# Patient Record
Sex: FEMALE | Race: BLACK OR AFRICAN AMERICAN | Employment: FULL TIME | URBAN - METROPOLITAN AREA
[De-identification: names, ages, dates, MRNs, and addresses within clinical notes are randomized per-mention and may not be internally consistent; named-entity substitution may affect disease eponyms.]

---

## 2018-02-10 ENCOUNTER — HOSPITAL ENCOUNTER (OUTPATIENT)
Dept: OTHER | Age: 23
Discharge: HOME | End: 2018-02-10
Attending: FAMILY MEDICINE | Admitting: CLINIC/CENTER

## 2018-02-12 LAB
RAPID INFLUENZA  B AGN: NEGATIVE
RAPID INFLUENZA A AGN: NEGATIVE

## 2018-12-07 ENCOUNTER — APPOINTMENT (OUTPATIENT)
Dept: GENERAL RADIOLOGY | Age: 23
End: 2018-12-07
Payer: OTHER MISCELLANEOUS

## 2018-12-07 ENCOUNTER — HOSPITAL ENCOUNTER (EMERGENCY)
Age: 23
Discharge: HOME | End: 2018-12-07
Attending: EMERGENCY MEDICINE
Payer: OTHER MISCELLANEOUS

## 2018-12-07 VITALS
HEART RATE: 101 BPM | SYSTOLIC BLOOD PRESSURE: 143 MMHG | RESPIRATION RATE: 20 BRPM | WEIGHT: 150 LBS | HEIGHT: 63 IN | TEMPERATURE: 98 F | BODY MASS INDEX: 26.58 KG/M2 | OXYGEN SATURATION: 100 % | DIASTOLIC BLOOD PRESSURE: 94 MMHG
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
## 2018-12-07 DIAGNOSIS — S16.1XXA STRAIN OF NECK MUSCLE, INITIAL ENCOUNTER: ICD-10-CM

## 2018-12-07 DIAGNOSIS — M79.662 PAIN IN LEFT SHIN: ICD-10-CM

## 2018-12-07 PROCEDURE — 6370000000 HC RX 637 (ALT 250 FOR IP): Performed by: EMERGENCY MEDICINE

## 2018-12-07 PROCEDURE — 71045 X-RAY EXAM CHEST 1 VIEW: CPT

## 2018-12-07 PROCEDURE — 99283 EMERGENCY DEPT VISIT LOW MDM: CPT

## 2018-12-07 PROCEDURE — 73560 X-RAY EXAM OF KNEE 1 OR 2: CPT

## 2018-12-07 PROCEDURE — 73590 X-RAY EXAM OF LOWER LEG: CPT

## 2018-12-07 PROCEDURE — 72040 X-RAY EXAM NECK SPINE 2-3 VW: CPT

## 2018-12-07 RX ORDER — DEXTROAMPHETAMINE SACCHARATE, AMPHETAMINE ASPARTATE, DEXTROAMPHETAMINE SULFATE AND AMPHETAMINE SULFATE 5; 5; 5; 5 MG/1; MG/1; MG/1; MG/1
20 TABLET ORAL DAILY
Status: ON HOLD | COMMUNITY
End: 2020-01-23

## 2018-12-07 RX ORDER — ACETAMINOPHEN 500 MG
1000 TABLET ORAL ONCE
Status: COMPLETED | OUTPATIENT
Start: 2018-12-07 | End: 2018-12-07

## 2018-12-07 RX ORDER — HYDROXYZINE PAMOATE 25 MG/1
25 CAPSULE ORAL ONCE
Status: COMPLETED | OUTPATIENT
Start: 2018-12-07 | End: 2018-12-07

## 2018-12-07 RX ADMIN — ACETAMINOPHEN 1000 MG: 500 TABLET, FILM COATED ORAL at 20:59

## 2018-12-07 RX ADMIN — HYDROXYZINE PAMOATE 25 MG: 25 CAPSULE ORAL at 21:00

## 2018-12-07 ASSESSMENT — PAIN SCALES - GENERAL
PAINLEVEL_OUTOF10: 6
PAINLEVEL_OUTOF10: 6

## 2018-12-07 ASSESSMENT — PAIN DESCRIPTION - LOCATION: LOCATION: NECK;ARM;CHEST

## 2018-12-07 ASSESSMENT — PAIN DESCRIPTION - ORIENTATION: ORIENTATION: LEFT
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
## 2018-12-08 NOTE — ED NOTES
Pt ambulated to the bathroom without difficulties. Pt requesting pregnancy test at this time. Urine cup provided.      Jer Fisher RN  12/07/18 2017
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
## 2018-12-09 ENCOUNTER — APPOINTMENT (OUTPATIENT)
Dept: GENERAL RADIOLOGY | Age: 23
End: 2018-12-09
Payer: OTHER MISCELLANEOUS

## 2018-12-09 ENCOUNTER — HOSPITAL ENCOUNTER (EMERGENCY)
Age: 23
Discharge: HOME | End: 2018-12-09
Payer: OTHER MISCELLANEOUS

## 2018-12-09 VITALS
DIASTOLIC BLOOD PRESSURE: 83 MMHG | OXYGEN SATURATION: 99 % | RESPIRATION RATE: 15 BRPM | BODY MASS INDEX: 26.58 KG/M2 | HEART RATE: 87 BPM | WEIGHT: 150 LBS | SYSTOLIC BLOOD PRESSURE: 133 MMHG | TEMPERATURE: 97.7 F | HEIGHT: 63 IN
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
## 2018-12-09 DIAGNOSIS — R07.81 RIB PAIN ON LEFT SIDE: ICD-10-CM

## 2018-12-09 PROCEDURE — 99284 EMERGENCY DEPT VISIT MOD MDM: CPT

## 2018-12-09 PROCEDURE — 6370000000 HC RX 637 (ALT 250 FOR IP): Performed by: PHYSICIAN ASSISTANT

## 2018-12-09 PROCEDURE — 71101 X-RAY EXAM UNILAT RIBS/CHEST: CPT

## 2018-12-09 RX ORDER — IBUPROFEN 800 MG/1
800 TABLET ORAL ONCE
Status: COMPLETED | OUTPATIENT
Start: 2018-12-09 | End: 2018-12-09

## 2018-12-09 RX ADMIN — IBUPROFEN 800 MG: 800 TABLET ORAL at 21:28

## 2018-12-09 ASSESSMENT — PAIN DESCRIPTION - PAIN TYPE: TYPE: ACUTE PAIN

## 2018-12-09 ASSESSMENT — PAIN DESCRIPTION - LOCATION: LOCATION: RIB CAGE

## 2018-12-09 ASSESSMENT — PAIN SCALES - GENERAL
PAINLEVEL_OUTOF10: 4
PAINLEVEL_OUTOF10: 4

## 2018-12-09 ASSESSMENT — PAIN DESCRIPTION - FREQUENCY: FREQUENCY: CONTINUOUS

## 2018-12-09 ASSESSMENT — PAIN DESCRIPTION - DESCRIPTORS: DESCRIPTORS: SORE;SHARP

## 2018-12-09 ASSESSMENT — PAIN DESCRIPTION - ORIENTATION: ORIENTATION: RIGHT

## 2019-06-06 ENCOUNTER — HOSPITAL ENCOUNTER (EMERGENCY)
Age: 24
Discharge: HOME | End: 2019-06-06
Attending: EMERGENCY MEDICINE
Payer: COMMERCIAL

## 2019-06-06 ENCOUNTER — APPOINTMENT (OUTPATIENT)
Dept: GENERAL RADIOLOGY | Age: 24
End: 2019-06-06
Payer: COMMERCIAL

## 2019-06-06 VITALS
BODY MASS INDEX: 26.58 KG/M2 | WEIGHT: 150 LBS | DIASTOLIC BLOOD PRESSURE: 91 MMHG | HEIGHT: 63 IN | HEART RATE: 100 BPM | OXYGEN SATURATION: 100 % | RESPIRATION RATE: 16 BRPM | TEMPERATURE: 98.1 F | SYSTOLIC BLOOD PRESSURE: 135 MMHG
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
## 2019-06-06 DIAGNOSIS — Z3A.01 LESS THAN 8 WEEKS GESTATION OF PREGNANCY: ICD-10-CM

## 2019-06-06 LAB
AMORPHOUS: ABNORMAL /HPF
BACTERIA: NEGATIVE /HPF
BASOPHILS ABSOLUTE: 0.1 K/CU MM
BASOPHILS RELATIVE PERCENT: 0.7 % (ref 0–1)
BILIRUBIN URINE: NEGATIVE MG/DL
BLOOD, URINE: NEGATIVE
CLARITY: ABNORMAL
COLOR: YELLOW
DIFFERENTIAL TYPE: ABNORMAL
EOSINOPHILS ABSOLUTE: 0.2 K/CU MM
EOSINOPHILS RELATIVE PERCENT: 1.2 % (ref 0–3)
GLUCOSE, URINE: NEGATIVE MG/DL
GONADOTROPIN, CHORIONIC (HCG) QUANT: NORMAL UIU/ML
HCT VFR BLD CALC: 42.1 % (ref 37–47)
HEMOGLOBIN: 13.9 GM/DL (ref 12.5–16)
IMMATURE NEUTROPHIL %: 0.3 % (ref 0–0.43)
KETONES, URINE: NEGATIVE MG/DL
LEUKOCYTE ESTERASE, URINE: NEGATIVE
LYMPHOCYTES ABSOLUTE: 2.4 K/CU MM
LYMPHOCYTES RELATIVE PERCENT: 17.5 % (ref 24–44)
MCH RBC QN AUTO: 30.2 PG (ref 27–31)
MCHC RBC AUTO-ENTMCNC: 33 % (ref 32–36)
MCV RBC AUTO: 91.3 FL (ref 78–100)
MONOCYTES ABSOLUTE: 0.8 K/CU MM
MONOCYTES RELATIVE PERCENT: 6 % (ref 0–4)
MUCUS: ABNORMAL HPF
NITRITE URINE, QUANTITATIVE: NEGATIVE
NUCLEATED RBC %: 0 %
PDW BLD-RTO: 12.9 % (ref 11.7–14.9)
PH, URINE: 7 (ref 5–8)
PLATELET # BLD: 390 K/CU MM (ref 140–440)
PMV BLD AUTO: 8.5 FL (ref 7.5–11.1)
PROTEIN UA: NEGATIVE MG/DL
RBC # BLD: 4.61 M/CU MM (ref 4.2–5.4)
RBC URINE: ABNORMAL /HPF (ref 0–6)
SEGMENTED NEUTROPHILS ABSOLUTE COUNT: 10.2 K/CU MM
SEGMENTED NEUTROPHILS RELATIVE PERCENT: 74.3 % (ref 36–66)
SPECIFIC GRAVITY UA: 1.02 (ref 1–1.03)
SQUAMOUS EPITHELIAL: 1 /HPF
TOTAL IMMATURE NEUTOROPHIL: 0.04 K/CU MM
TOTAL NUCLEATED RBC: 0 K/CU MM
TRICHOMONAS: ABNORMAL /HPF
UROBILINOGEN, URINE: 1 MG/DL (ref 0.2–1)
WBC # BLD: 13.7 K/CU MM (ref 4–10.5)
WBC UA: ABNORMAL /HPF (ref 0–5)

## 2019-06-06 PROCEDURE — 84702 CHORIONIC GONADOTROPIN TEST: CPT

## 2019-06-06 PROCEDURE — 81001 URINALYSIS AUTO W/SCOPE: CPT

## 2019-06-06 PROCEDURE — 99284 EMERGENCY DEPT VISIT MOD MDM: CPT

## 2019-06-06 PROCEDURE — 6370000000 HC RX 637 (ALT 250 FOR IP): Performed by: EMERGENCY MEDICINE

## 2019-06-06 PROCEDURE — 85025 COMPLETE CBC W/AUTO DIFF WBC: CPT

## 2019-06-06 RX ORDER — ACETAMINOPHEN 500 MG
1000 TABLET ORAL ONCE
Status: COMPLETED | OUTPATIENT
Start: 2019-06-06 | End: 2019-06-06

## 2019-06-06 RX ADMIN — ACETAMINOPHEN 1000 MG: 500 TABLET ORAL at 22:28

## 2019-06-06 ASSESSMENT — PAIN SCALES - GENERAL: PAINLEVEL_OUTOF10: 4

## 2019-06-06 ASSESSMENT — PAIN DESCRIPTION - LOCATION: LOCATION: ABDOMEN;RIB CAGE
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
## 2019-08-15 LAB
ABO, EXTERNAL RESULT: NORMAL
C. TRACHOMATIS, EXTERNAL RESULT: NEGATIVE
HEP B, EXTERNAL RESULT: NEGATIVE
HIV, EXTERNAL RESULT: NONREACTIVE
N. GONORRHOEAE, EXTERNAL RESULT: NEGATIVE
RH FACTOR, EXTERNAL RESULT: POSITIVE
RPR, EXTERNAL RESULT: NONREACTIVE
RUBELLA TITER, EXTERNAL RESULT: NORMAL

## 2020-01-09 PROBLEM — K64.5 THROMBOSED EXTERNAL HEMORRHOID: Status: ACTIVE | Noted: 2020-01-09

## 2020-01-09 PROBLEM — K62.89 ANAL PAIN: Status: ACTIVE | Noted: 2020-01-09

## 2020-01-23 ENCOUNTER — HOSPITAL ENCOUNTER (OUTPATIENT)
Age: 25
Discharge: HOME | End: 2020-01-23
Attending: OBSTETRICS & GYNECOLOGY | Admitting: OBSTETRICS & GYNECOLOGY
Payer: COMMERCIAL

## 2020-01-23 VITALS
WEIGHT: 226 LBS | DIASTOLIC BLOOD PRESSURE: 86 MMHG | HEIGHT: 63 IN | SYSTOLIC BLOOD PRESSURE: 144 MMHG | BODY MASS INDEX: 40.04 KG/M2 | HEART RATE: 94 BPM | RESPIRATION RATE: 18 BRPM | TEMPERATURE: 97.9 F

## 2020-01-23 PROBLEM — Z34.93 ENCOUNTER FOR PREGNANCY RELATED EXAMINATION IN THIRD TRIMESTER: Status: ACTIVE | Noted: 2020-01-23

## 2020-01-23 PROCEDURE — 59025 FETAL NON-STRESS TEST: CPT

## 2020-01-23 PROCEDURE — 6360000002 HC RX W HCPCS: Performed by: OBSTETRICS & GYNECOLOGY

## 2020-01-23 PROCEDURE — 96372 THER/PROPH/DIAG INJ SC/IM: CPT

## 2020-01-23 RX ORDER — BETAMETHASONE SODIUM PHOSPHATE AND BETAMETHASONE ACETATE 3; 3 MG/ML; MG/ML
12 INJECTION, SUSPENSION INTRA-ARTICULAR; INTRALESIONAL; INTRAMUSCULAR; SOFT TISSUE ONCE
Status: COMPLETED | OUTPATIENT
Start: 2020-01-23 | End: 2020-01-23

## 2020-01-23 RX ADMIN — BETAMETHASONE SODIUM PHOSPHATE AND BETAMETHASONE ACETATE 12 MG: 3; 3 INJECTION, SUSPENSION INTRA-ARTICULAR; INTRALESIONAL; INTRAMUSCULAR at 17:31

## 2020-01-23 NOTE — FLOWSHEET NOTE
Monitors removed. Discharge instructions given and reviewed. Voiced understanding. Will return tomorrow for second celestone injection.

## 2020-01-23 NOTE — FLOWSHEET NOTE
Dr Ead Oliva notified of patient arrival for NST and celestone. Instructed may discharge patient after NST obtained and celestone given.

## 2020-01-24 ENCOUNTER — HOSPITAL ENCOUNTER (OUTPATIENT)
Age: 25
Discharge: HOME | End: 2020-01-24
Attending: OBSTETRICS & GYNECOLOGY | Admitting: OBSTETRICS & GYNECOLOGY
Payer: COMMERCIAL

## 2020-01-24 VITALS
RESPIRATION RATE: 16 BRPM | DIASTOLIC BLOOD PRESSURE: 77 MMHG | SYSTOLIC BLOOD PRESSURE: 138 MMHG | HEART RATE: 101 BPM | TEMPERATURE: 98 F

## 2020-01-24 PROCEDURE — 6360000002 HC RX W HCPCS: Performed by: OBSTETRICS & GYNECOLOGY

## 2020-01-24 PROCEDURE — 96372 THER/PROPH/DIAG INJ SC/IM: CPT

## 2020-01-24 PROCEDURE — 59025 FETAL NON-STRESS TEST: CPT

## 2020-01-24 RX ORDER — BETAMETHASONE SODIUM PHOSPHATE AND BETAMETHASONE ACETATE 3; 3 MG/ML; MG/ML
12 INJECTION, SUSPENSION INTRA-ARTICULAR; INTRALESIONAL; INTRAMUSCULAR; SOFT TISSUE ONCE
Status: COMPLETED | OUTPATIENT
Start: 2020-01-24 | End: 2020-01-24

## 2020-01-24 RX ADMIN — BETAMETHASONE SODIUM PHOSPHATE AND BETAMETHASONE ACETATE 12 MG: 3; 3 INJECTION, SUSPENSION INTRA-ARTICULAR; INTRALESIONAL; INTRAMUSCULAR at 18:39

## 2020-01-24 NOTE — FLOWSHEET NOTE
Patient ambulates to unit for second celestone dose and NST. Pt denies any vaginal bleeding, leaking of fluid or contractions. Pt confirms fetal movement. EFM and toco applied.

## 2020-01-28 ENCOUNTER — ANESTHESIA EVENT (OUTPATIENT)
Dept: LABOR AND DELIVERY | Age: 25
End: 2020-01-28
Payer: COMMERCIAL

## 2020-01-30 ENCOUNTER — ANESTHESIA (OUTPATIENT)
Dept: LABOR AND DELIVERY | Age: 25
End: 2020-01-30
Payer: COMMERCIAL

## 2020-01-30 ENCOUNTER — HOSPITAL ENCOUNTER (INPATIENT)
Age: 25
LOS: 4 days | Discharge: HOME | End: 2020-02-03
Attending: OBSTETRICS & GYNECOLOGY | Admitting: OBSTETRICS & GYNECOLOGY
Payer: COMMERCIAL

## 2020-01-30 VITALS — DIASTOLIC BLOOD PRESSURE: 55 MMHG | SYSTOLIC BLOOD PRESSURE: 125 MMHG | OXYGEN SATURATION: 100 %

## 2020-01-30 PROBLEM — O14.03 MILD PREECLAMPSIA, THIRD TRIMESTER: Status: ACTIVE | Noted: 2020-01-30

## 2020-01-30 LAB
ABO/RH: NORMAL
ALBUMIN SERPL-MCNC: 3.5 GM/DL (ref 3.4–5)
ALP BLD-CCNC: 175 IU/L (ref 40–128)
ALT SERPL-CCNC: 7 U/L (ref 10–40)
AMPHETAMINES: NEGATIVE
ANION GAP SERPL CALCULATED.3IONS-SCNC: 13 MMOL/L (ref 4–16)
ANTIBODY SCREEN: NEGATIVE
AST SERPL-CCNC: 12 IU/L (ref 15–37)
BACTERIA: ABNORMAL /HPF
BARBITURATE SCREEN URINE: NEGATIVE
BENZODIAZEPINE SCREEN, URINE: NEGATIVE
BILIRUB SERPL-MCNC: 0.2 MG/DL (ref 0–1)
BILIRUBIN URINE: NEGATIVE MG/DL
BLOOD, URINE: NEGATIVE
BUN BLDV-MCNC: 5 MG/DL (ref 6–23)
CALCIUM SERPL-MCNC: 8.6 MG/DL (ref 8.3–10.6)
CANNABINOID SCREEN URINE: NEGATIVE
CHLORIDE BLD-SCNC: 101 MMOL/L (ref 99–110)
CLARITY: ABNORMAL
CO2: 22 MMOL/L (ref 21–32)
COCAINE METABOLITE: NEGATIVE
COLOR: YELLOW
CREAT SERPL-MCNC: 0.5 MG/DL (ref 0.6–1.1)
GFR AFRICAN AMERICAN: >60 ML/MIN/1.73M2
GFR NON-AFRICAN AMERICAN: >60 ML/MIN/1.73M2
GLUCOSE BLD-MCNC: 69 MG/DL (ref 70–99)
GLUCOSE, URINE: NEGATIVE MG/DL
HCT VFR BLD CALC: 33.3 % (ref 37–47)
HEMOGLOBIN: 10.5 GM/DL (ref 12.5–16)
KETONES, URINE: NEGATIVE MG/DL
LEUKOCYTE ESTERASE, URINE: ABNORMAL
MCH RBC QN AUTO: 27.3 PG (ref 27–31)
MCHC RBC AUTO-ENTMCNC: 31.5 % (ref 32–36)
MCV RBC AUTO: 86.7 FL (ref 78–100)
NITRITE URINE, QUANTITATIVE: NEGATIVE
OPIATES, URINE: NEGATIVE
OXYCODONE: NORMAL
PDW BLD-RTO: 15.4 % (ref 11.7–14.9)
PH, URINE: 7 (ref 5–8)
PHENCYCLIDINE, URINE: NEGATIVE
PLATELET # BLD: 478 K/CU MM (ref 140–440)
PMV BLD AUTO: 9.8 FL (ref 7.5–11.1)
POTASSIUM SERPL-SCNC: 4 MMOL/L (ref 3.5–5.1)
PROTEIN UA: NEGATIVE MG/DL
RBC # BLD: 3.84 M/CU MM (ref 4.2–5.4)
RBC URINE: <1 /HPF (ref 0–6)
SODIUM BLD-SCNC: 136 MMOL/L (ref 135–145)
SPECIFIC GRAVITY UA: 1.01 (ref 1–1.03)
SPERM: ABNORMAL /HFP
SQUAMOUS EPITHELIAL: 7 /HPF
TOTAL PROTEIN: 6.3 GM/DL (ref 6.4–8.2)
TRANSITIONAL EPITHELIAL: <1 /HPF
TRICHOMONAS: ABNORMAL /HPF
URIC ACID: 3.7 MG/DL (ref 2.6–6)
UROBILINOGEN, URINE: NORMAL MG/DL (ref 0.2–1)
WBC # BLD: 16.2 K/CU MM (ref 4–10.5)
WBC UA: 1 /HPF (ref 0–5)

## 2020-01-30 PROCEDURE — 2580000003 HC RX 258: Performed by: OBSTETRICS & GYNECOLOGY

## 2020-01-30 PROCEDURE — 86900 BLOOD TYPING SEROLOGIC ABO: CPT

## 2020-01-30 PROCEDURE — 2500000003 HC RX 250 WO HCPCS: Performed by: NURSE ANESTHETIST, CERTIFIED REGISTERED

## 2020-01-30 PROCEDURE — 1220000000 HC SEMI PRIVATE OB R&B

## 2020-01-30 PROCEDURE — 86850 RBC ANTIBODY SCREEN: CPT

## 2020-01-30 PROCEDURE — 6370000000 HC RX 637 (ALT 250 FOR IP): Performed by: OBSTETRICS & GYNECOLOGY

## 2020-01-30 PROCEDURE — 3609079900 HC CESAREAN SECTION: Performed by: OBSTETRICS & GYNECOLOGY

## 2020-01-30 PROCEDURE — 6360000002 HC RX W HCPCS: Performed by: OBSTETRICS & GYNECOLOGY

## 2020-01-30 PROCEDURE — 84550 ASSAY OF BLOOD/URIC ACID: CPT

## 2020-01-30 PROCEDURE — 3700000001 HC ADD 15 MINUTES (ANESTHESIA): Performed by: OBSTETRICS & GYNECOLOGY

## 2020-01-30 PROCEDURE — 3700000000 HC ANESTHESIA ATTENDED CARE: Performed by: OBSTETRICS & GYNECOLOGY

## 2020-01-30 PROCEDURE — 6370000000 HC RX 637 (ALT 250 FOR IP)

## 2020-01-30 PROCEDURE — 80053 COMPREHEN METABOLIC PANEL: CPT

## 2020-01-30 PROCEDURE — 6360000002 HC RX W HCPCS: Performed by: NURSE ANESTHETIST, CERTIFIED REGISTERED

## 2020-01-30 PROCEDURE — 86901 BLOOD TYPING SEROLOGIC RH(D): CPT

## 2020-01-30 PROCEDURE — 81001 URINALYSIS AUTO W/SCOPE: CPT

## 2020-01-30 PROCEDURE — 85027 COMPLETE CBC AUTOMATED: CPT

## 2020-01-30 PROCEDURE — 80307 DRUG TEST PRSMV CHEM ANLYZR: CPT

## 2020-01-30 RX ORDER — OXYCODONE HYDROCHLORIDE 5 MG/1
10 TABLET ORAL EVERY 4 HOURS PRN
Status: DISCONTINUED | OUTPATIENT
Start: 2020-01-30 | End: 2020-02-03 | Stop reason: HOSPADM

## 2020-01-30 RX ORDER — SODIUM CHLORIDE 0.9 % (FLUSH) 0.9 %
10 SYRINGE (ML) INJECTION EVERY 12 HOURS SCHEDULED
Status: DISCONTINUED | OUTPATIENT
Start: 2020-01-30 | End: 2020-02-03 | Stop reason: HOSPADM

## 2020-01-30 RX ORDER — SODIUM CHLORIDE, SODIUM LACTATE, POTASSIUM CHLORIDE, AND CALCIUM CHLORIDE .6; .31; .03; .02 G/100ML; G/100ML; G/100ML; G/100ML
1000 INJECTION, SOLUTION INTRAVENOUS ONCE
Status: DISCONTINUED | OUTPATIENT
Start: 2020-01-30 | End: 2020-01-30

## 2020-01-30 RX ORDER — FAMOTIDINE 20 MG/1
20 TABLET, FILM COATED ORAL 2 TIMES DAILY PRN
Status: DISCONTINUED | OUTPATIENT
Start: 2020-01-30 | End: 2020-02-03 | Stop reason: HOSPADM

## 2020-01-30 RX ORDER — SIMETHICONE 80 MG
80 TABLET,CHEWABLE ORAL EVERY 6 HOURS PRN
Status: DISCONTINUED | OUTPATIENT
Start: 2020-01-30 | End: 2020-02-03 | Stop reason: HOSPADM

## 2020-01-30 RX ORDER — NICOTINE 21 MG/24HR
1 PATCH, TRANSDERMAL 24 HOURS TRANSDERMAL DAILY
Status: DISCONTINUED | OUTPATIENT
Start: 2020-01-30 | End: 2020-02-03 | Stop reason: HOSPADM

## 2020-01-30 RX ORDER — NALBUPHINE HCL 10 MG/ML
5 AMPUL (ML) INJECTION EVERY 4 HOURS PRN
Status: DISCONTINUED | OUTPATIENT
Start: 2020-01-30 | End: 2020-01-30

## 2020-01-30 RX ORDER — MORPHINE SULFATE 0.5 MG/ML
INJECTION, SOLUTION EPIDURAL; INTRATHECAL; INTRAVENOUS PRN
Status: DISCONTINUED | OUTPATIENT
Start: 2020-01-30 | End: 2020-01-30 | Stop reason: SDUPTHER

## 2020-01-30 RX ORDER — OXYCODONE HYDROCHLORIDE 5 MG/1
5 TABLET ORAL EVERY 4 HOURS PRN
Status: DISCONTINUED | OUTPATIENT
Start: 2020-01-30 | End: 2020-02-03 | Stop reason: HOSPADM

## 2020-01-30 RX ORDER — FLUOXETINE HYDROCHLORIDE 20 MG/1
20 CAPSULE ORAL DAILY
Status: DISCONTINUED | OUTPATIENT
Start: 2020-01-30 | End: 2020-02-01

## 2020-01-30 RX ORDER — CEFAZOLIN SODIUM 2 G/100ML
2 INJECTION, SOLUTION INTRAVENOUS ONCE
Status: COMPLETED | OUTPATIENT
Start: 2020-01-30 | End: 2020-01-30

## 2020-01-30 RX ORDER — DIPHENHYDRAMINE HYDROCHLORIDE 50 MG/ML
12.5 INJECTION INTRAMUSCULAR; INTRAVENOUS
Status: COMPLETED | OUTPATIENT
Start: 2020-01-30 | End: 2020-01-30

## 2020-01-30 RX ORDER — BUPIVACAINE HYDROCHLORIDE 7.5 MG/ML
INJECTION, SOLUTION INTRASPINAL PRN
Status: DISCONTINUED | OUTPATIENT
Start: 2020-01-30 | End: 2020-01-30 | Stop reason: SDUPTHER

## 2020-01-30 RX ORDER — SODIUM CHLORIDE 0.9 % (FLUSH) 0.9 %
10 SYRINGE (ML) INJECTION PRN
Status: DISCONTINUED | OUTPATIENT
Start: 2020-01-30 | End: 2020-02-03 | Stop reason: HOSPADM

## 2020-01-30 RX ORDER — ONDANSETRON 2 MG/ML
4 INJECTION INTRAMUSCULAR; INTRAVENOUS EVERY 6 HOURS PRN
Status: DISCONTINUED | OUTPATIENT
Start: 2020-01-30 | End: 2020-01-30 | Stop reason: HOSPADM

## 2020-01-30 RX ORDER — ALBUTEROL SULFATE 90 UG/1
2 AEROSOL, METERED RESPIRATORY (INHALATION) EVERY 4 HOURS PRN
Status: DISCONTINUED | OUTPATIENT
Start: 2020-01-30 | End: 2020-02-03 | Stop reason: HOSPADM

## 2020-01-30 RX ORDER — LANOLIN 100 %
OINTMENT (GRAM) TOPICAL
Status: DISCONTINUED | OUTPATIENT
Start: 2020-01-30 | End: 2020-02-03 | Stop reason: HOSPADM

## 2020-01-30 RX ORDER — KETOROLAC TROMETHAMINE 30 MG/ML
INJECTION, SOLUTION INTRAMUSCULAR; INTRAVENOUS PRN
Status: DISCONTINUED | OUTPATIENT
Start: 2020-01-30 | End: 2020-01-30 | Stop reason: SDUPTHER

## 2020-01-30 RX ORDER — KETOROLAC TROMETHAMINE 30 MG/ML
30 INJECTION, SOLUTION INTRAMUSCULAR; INTRAVENOUS EVERY 6 HOURS
Status: DISCONTINUED | OUTPATIENT
Start: 2020-01-30 | End: 2020-01-31

## 2020-01-30 RX ORDER — DEXAMETHASONE SODIUM PHOSPHATE 4 MG/ML
INJECTION, SOLUTION INTRA-ARTICULAR; INTRALESIONAL; INTRAMUSCULAR; INTRAVENOUS; SOFT TISSUE PRN
Status: DISCONTINUED | OUTPATIENT
Start: 2020-01-30 | End: 2020-01-30 | Stop reason: SDUPTHER

## 2020-01-30 RX ORDER — BISACODYL 10 MG
10 SUPPOSITORY, RECTAL RECTAL DAILY PRN
Status: DISCONTINUED | OUTPATIENT
Start: 2020-01-30 | End: 2020-02-03 | Stop reason: HOSPADM

## 2020-01-30 RX ORDER — HYDROMORPHONE HCL 110MG/55ML
0.5 PATIENT CONTROLLED ANALGESIA SYRINGE INTRAVENOUS EVERY 5 MIN PRN
Status: DISCONTINUED | OUTPATIENT
Start: 2020-01-30 | End: 2020-01-30 | Stop reason: HOSPADM

## 2020-01-30 RX ORDER — HYDROMORPHONE HCL 110MG/55ML
0.25 PATIENT CONTROLLED ANALGESIA SYRINGE INTRAVENOUS EVERY 5 MIN PRN
Status: DISCONTINUED | OUTPATIENT
Start: 2020-01-30 | End: 2020-01-30 | Stop reason: HOSPADM

## 2020-01-30 RX ORDER — SODIUM CHLORIDE, SODIUM LACTATE, POTASSIUM CHLORIDE, CALCIUM CHLORIDE 600; 310; 30; 20 MG/100ML; MG/100ML; MG/100ML; MG/100ML
INJECTION, SOLUTION INTRAVENOUS
Status: COMPLETED
Start: 2020-01-30 | End: 2020-01-30

## 2020-01-30 RX ORDER — PRENATAL VIT/IRON FUM/FOLIC AC 27MG-0.8MG
1 TABLET ORAL DAILY
Status: DISCONTINUED | OUTPATIENT
Start: 2020-01-31 | End: 2020-02-03 | Stop reason: HOSPADM

## 2020-01-30 RX ORDER — DOCUSATE SODIUM 100 MG/1
100 CAPSULE, LIQUID FILLED ORAL 2 TIMES DAILY
Status: DISCONTINUED | OUTPATIENT
Start: 2020-01-30 | End: 2020-02-03 | Stop reason: HOSPADM

## 2020-01-30 RX ORDER — ONDANSETRON 4 MG/1
8 TABLET, ORALLY DISINTEGRATING ORAL EVERY 8 HOURS PRN
Status: DISCONTINUED | OUTPATIENT
Start: 2020-01-30 | End: 2020-02-03 | Stop reason: HOSPADM

## 2020-01-30 RX ORDER — TRISODIUM CITRATE DIHYDRATE AND CITRIC ACID MONOHYDRATE 500; 334 MG/5ML; MG/5ML
30 SOLUTION ORAL ONCE
Status: COMPLETED | OUTPATIENT
Start: 2020-01-30 | End: 2020-01-30

## 2020-01-30 RX ORDER — SODIUM CHLORIDE, SODIUM LACTATE, POTASSIUM CHLORIDE, CALCIUM CHLORIDE 600; 310; 30; 20 MG/100ML; MG/100ML; MG/100ML; MG/100ML
INJECTION, SOLUTION INTRAVENOUS CONTINUOUS
Status: DISCONTINUED | OUTPATIENT
Start: 2020-01-30 | End: 2020-01-30

## 2020-01-30 RX ORDER — DIPHENHYDRAMINE HYDROCHLORIDE 50 MG/ML
25 INJECTION INTRAMUSCULAR; INTRAVENOUS EVERY 6 HOURS PRN
Status: DISCONTINUED | OUTPATIENT
Start: 2020-01-30 | End: 2020-02-03 | Stop reason: HOSPADM

## 2020-01-30 RX ORDER — KETOROLAC TROMETHAMINE 30 MG/ML
30 INJECTION, SOLUTION INTRAMUSCULAR; INTRAVENOUS EVERY 6 HOURS PRN
Status: DISCONTINUED | OUTPATIENT
Start: 2020-01-30 | End: 2020-01-30

## 2020-01-30 RX ORDER — METHYLERGONOVINE MALEATE 0.2 MG/ML
200 INJECTION INTRAVENOUS PRN
Status: CANCELLED | OUTPATIENT
Start: 2020-01-30

## 2020-01-30 RX ORDER — ONDANSETRON 2 MG/ML
4 INJECTION INTRAMUSCULAR; INTRAVENOUS EVERY 6 HOURS PRN
Status: DISCONTINUED | OUTPATIENT
Start: 2020-01-30 | End: 2020-02-03 | Stop reason: HOSPADM

## 2020-01-30 RX ORDER — CEFAZOLIN SODIUM 2 G/100ML
2 INJECTION, SOLUTION INTRAVENOUS EVERY 8 HOURS
Status: COMPLETED | OUTPATIENT
Start: 2020-01-30 | End: 2020-01-31

## 2020-01-30 RX ORDER — ACETAMINOPHEN 500 MG
1000 TABLET ORAL EVERY 6 HOURS
Status: DISCONTINUED | OUTPATIENT
Start: 2020-01-30 | End: 2020-02-03 | Stop reason: HOSPADM

## 2020-01-30 RX ORDER — MISOPROSTOL 200 UG/1
800 TABLET ORAL PRN
Status: DISCONTINUED | OUTPATIENT
Start: 2020-01-30 | End: 2020-02-03 | Stop reason: HOSPADM

## 2020-01-30 RX ORDER — MEPERIDINE HYDROCHLORIDE 25 MG/ML
12.5 INJECTION INTRAMUSCULAR; INTRAVENOUS; SUBCUTANEOUS EVERY 5 MIN PRN
Status: DISCONTINUED | OUTPATIENT
Start: 2020-01-30 | End: 2020-01-30 | Stop reason: HOSPADM

## 2020-01-30 RX ORDER — SODIUM CHLORIDE, SODIUM LACTATE, POTASSIUM CHLORIDE, CALCIUM CHLORIDE 600; 310; 30; 20 MG/100ML; MG/100ML; MG/100ML; MG/100ML
INJECTION, SOLUTION INTRAVENOUS
Status: DISCONTINUED
Start: 2020-01-30 | End: 2020-01-30

## 2020-01-30 RX ORDER — MIDAZOLAM HYDROCHLORIDE 1 MG/ML
INJECTION INTRAMUSCULAR; INTRAVENOUS PRN
Status: DISCONTINUED | OUTPATIENT
Start: 2020-01-30 | End: 2020-01-30 | Stop reason: SDUPTHER

## 2020-01-30 RX ORDER — SODIUM CHLORIDE, SODIUM LACTATE, POTASSIUM CHLORIDE, CALCIUM CHLORIDE 600; 310; 30; 20 MG/100ML; MG/100ML; MG/100ML; MG/100ML
INJECTION, SOLUTION INTRAVENOUS CONTINUOUS
Status: DISCONTINUED | OUTPATIENT
Start: 2020-01-30 | End: 2020-02-03 | Stop reason: HOSPADM

## 2020-01-30 RX ORDER — PROMETHAZINE HYDROCHLORIDE 25 MG/ML
6.25 INJECTION, SOLUTION INTRAMUSCULAR; INTRAVENOUS EVERY 6 HOURS PRN
Status: DISCONTINUED | OUTPATIENT
Start: 2020-01-30 | End: 2020-01-30

## 2020-01-30 RX ORDER — IBUPROFEN 800 MG/1
800 TABLET ORAL EVERY 8 HOURS
Status: DISCONTINUED | OUTPATIENT
Start: 2020-02-01 | End: 2020-01-31

## 2020-01-30 RX ORDER — TRISODIUM CITRATE DIHYDRATE AND CITRIC ACID MONOHYDRATE 500; 334 MG/5ML; MG/5ML
SOLUTION ORAL
Status: COMPLETED
Start: 2020-01-30 | End: 2020-01-30

## 2020-01-30 RX ADMIN — MORPHINE SULFATE 4.8 MG: 0.5 INJECTION, SOLUTION EPIDURAL; INTRATHECAL; INTRAVENOUS at 15:11

## 2020-01-30 RX ADMIN — TRISODIUM CITRATE DIHYDRATE AND CITRIC ACID MONOHYDRATE 30 ML: 500; 334 SOLUTION ORAL at 12:33

## 2020-01-30 RX ADMIN — SODIUM CITRATE AND CITRIC ACID MONOHYDRATE 30 ML: 500; 334 SOLUTION ORAL at 12:33

## 2020-01-30 RX ADMIN — DOCUSATE SODIUM 100 MG: 100 CAPSULE, LIQUID FILLED ORAL at 22:20

## 2020-01-30 RX ADMIN — CEFAZOLIN SODIUM 2 G: 2 INJECTION, SOLUTION INTRAVENOUS at 22:22

## 2020-01-30 RX ADMIN — Medication 500 ML/HR: at 15:03

## 2020-01-30 RX ADMIN — ACETAMINOPHEN 1000 MG: 500 TABLET ORAL at 22:21

## 2020-01-30 RX ADMIN — SODIUM CHLORIDE, POTASSIUM CHLORIDE, SODIUM LACTATE AND CALCIUM CHLORIDE 125 ML/HR: 600; 310; 30; 20 INJECTION, SOLUTION INTRAVENOUS at 18:30

## 2020-01-30 RX ADMIN — MORPHINE SULFATE 0.2 MG: 0.5 INJECTION, SOLUTION EPIDURAL; INTRATHECAL; INTRAVENOUS at 14:35

## 2020-01-30 RX ADMIN — KETOROLAC TROMETHAMINE 30 MG: 30 INJECTION, SOLUTION INTRAMUSCULAR; INTRAVENOUS at 15:10

## 2020-01-30 RX ADMIN — MIDAZOLAM 2 MG: 1 INJECTION INTRAMUSCULAR; INTRAVENOUS at 15:10

## 2020-01-30 RX ADMIN — CEFAZOLIN SODIUM 2 G: 2 INJECTION, SOLUTION INTRAVENOUS at 14:23

## 2020-01-30 RX ADMIN — DEXAMETHASONE SODIUM PHOSPHATE 4 MG: 4 INJECTION, SOLUTION INTRAMUSCULAR; INTRAVENOUS at 15:04

## 2020-01-30 RX ADMIN — SODIUM CHLORIDE, POTASSIUM CHLORIDE, SODIUM LACTATE AND CALCIUM CHLORIDE: 600; 310; 30; 20 INJECTION, SOLUTION INTRAVENOUS at 14:29

## 2020-01-30 RX ADMIN — DIPHENHYDRAMINE HYDROCHLORIDE 12.5 MG: 50 INJECTION, SOLUTION INTRAMUSCULAR; INTRAVENOUS at 18:24

## 2020-01-30 RX ADMIN — BUPIVACAINE HYDROCHLORIDE IN DEXTROSE 1.6 ML: 7.5 INJECTION, SOLUTION SUBARACHNOID at 14:35

## 2020-01-30 RX ADMIN — ONDANSETRON 4 MG: 2 INJECTION INTRAMUSCULAR; INTRAVENOUS at 12:34

## 2020-01-30 RX ADMIN — KETOROLAC TROMETHAMINE 30 MG: 30 INJECTION, SOLUTION INTRAMUSCULAR; INTRAVENOUS at 22:22

## 2020-01-30 ASSESSMENT — PULMONARY FUNCTION TESTS
PIF_VALUE: 0
PIF_VALUE: 1
PIF_VALUE: 0

## 2020-01-30 ASSESSMENT — PAIN SCALES - GENERAL
PAINLEVEL_OUTOF10: 0

## 2020-01-30 ASSESSMENT — LIFESTYLE VARIABLES: SMOKING_STATUS: 1

## 2020-01-30 NOTE — FLOWSHEET NOTE
Patient presents to unit for scheduled C section. Patient taken to Tr03; patient denies LOF, bleeding and states baby is active. EFM/TOCO monitors applied; audible static and toco elevation noted.

## 2020-01-30 NOTE — H&P
file    Stress: Not on file   Relationships    Social connections:     Talks on phone: Not on file     Gets together: Not on file     Attends [employer] service: Not on file     Active member of club or organization: Not on file     Attends meetings of clubs or organizations: Not on file     Relationship status: Not on file    Intimate partner violence:     Fear of current or ex partner: Not on file     Emotionally abused: Not on file     Physically abused: Not on file     Forced sexual activity: Not on file   Other Topics Concern    Not on file   Social History Narrative    Not on file     Family History:       Problem Relation Age of Onset    Cancer Other     Diabetes Other      Medications Prior to Admission:  Medications Prior to Admission: FLUoxetine (PROZAC) 20 MG capsule, TK 1 C PO QAM  albuterol sulfate  (90 Base) MCG/ACT inhaler, INHALE 2 PUFFS PO EVERY 4-6 HOURS PRN  Prenatal MV-Min-Fe Fum-FA-DHA (PRENATAL 1) 30-0.975-200 MG CAPS, Take 1 tablet by mouth daily    REVIEW OF SYSTEMS:    CONSTITUTIONAL:  negative  RESPIRATORY:  negative  CARDIOVASCULAR:  negative  GASTROINTESTINAL:  negative  ALLERGIC/IMMUNOLOGIC:  negative  NEUROLOGICAL:  negative  BEHAVIOR/PSYCH:  negative    PHYSICAL EXAM:  Last menstrual period 05/16/2019, unknown if currently breastfeeding. General appearance:  awake, alert, cooperative, no apparent distress, and appears stated age  Neurologic:  Awake, alert, oriented to name, place and time.     Lungs:  No increased work of breathing, good air exchange  Abdomen:  Soft, non tender, gravid, consistent with her gestational age, EFW by Leopald's manouever was 3300gm   Fetal heart rate:    Cat 1    Extremities:  1+ edema, 2+ DTRS      Contraction frequency:  5 minutes    Membranes:  Intact  Uric acid [[id number]]    Collected: 01/30/20 1201    Updated: 01/30/20 1305    Specimen Source: Blood     Uric Acid 3.7 MG/DL   Comprehensive metabolic panel [[id number]] (Abnormal)

## 2020-01-30 NOTE — ANESTHESIA PRE PROCEDURE
pregnancy related examination in third trimester Z34.93       Past Medical History:        Diagnosis Date    Anxiety disorder     Asthma     Depression 2015       Past Surgical History:        Procedure Laterality Date     SECTION         Social History:    Social History     Tobacco Use    Smoking status: Current Every Day Smoker     Packs/day: 1.00     Types: Cigarettes     Last attempt to quit: 3/3/2015     Years since quittin.9    Smokeless tobacco: Never Used   Substance Use Topics    Alcohol use: Yes     Comment: occ-wine 6 glasses during the pregnancy                                Ready to quit: Not Answered  Counseling given: Not Answered      Vital Signs (Current): There were no vitals filed for this visit. BP Readings from Last 3 Encounters:   20 138/77   20 (!) 144/86   19 (!) 135/91       NPO Status:                                                                         Clears 1100                           Solids 12 hrs. BMI:   Wt Readings from Last 3 Encounters:   20 226 lb (102.5 kg)   20 226 lb (102.5 kg)   19 150 lb (68 kg)     There is no height or weight on file to calculate BMI.    CBC:   Lab Results   Component Value Date    WBC 16.2 2020    RBC 3.84 2020    HGB 10.5 2020    HCT 33.3 2020    MCV 86.7 2020    RDW 15.4 2020     2020       CMP:   Lab Results   Component Value Date     2014    K 4.0 2014    CL 99 2014    CO2 23 2014    BUN 10 2014    CREATININE 0.6 2014    GFRAA >60 2014    LABGLOM >60 2014    GLUCOSE 75 2013    PROT 7.4 2014    CALCIUM 9.2 2014    BILITOT 0.2 2014    ALKPHOS 54 2014    AST 18 2014    ALT 16 2014       POC Tests: No results for input(s): POCGLU, POCNA, POCK, POCCL, POCBUN, POCHEMO, POCHCT in the last 72 hours.     Coags: No results found for: PROTIME, INR, APTT    HCG (If Applicable):   Lab Results   Component Value Date    PREGTESTUR neg 12/09/2013        ABGs: No results found for: PHART, PO2ART, UPX2YIQ, PLV7OCC, BEART, K2JCEQHX     Type & Screen (If Applicable):  No results found for: LABABO, [address]    Anesthesia Evaluation  Patient summary reviewed and Nursing notes reviewed no history of anesthetic complications:   Airway: Mallampati: II  TM distance: >3 FB   Neck ROM: full  Mouth opening: > = 3 FB Dental: normal exam         Pulmonary:normal exam    (+) asthma: current smoker          Patient did not smoke on day of surgery. Cardiovascular:Negative CV ROS  Exercise tolerance: good (>4 METS),            Beta Blocker:  Not on Beta Blocker         Neuro/Psych:   (+) psychiatric history:depression/anxiety             GI/Hepatic/Renal:   (+) morbid obesity          Endo/Other: Negative Endo/Other ROS                    Abdominal:           Vascular: negative vascular ROS. Anesthesia Plan      spinal     ASA 2             Anesthetic plan and risks discussed with patient. MARGARITO Briceno CRNA   1/30/2020        Pre Anesthesia Evaluation complete. Anesthesia plan, risks, benefits, alternatives, and personnel discussed with patient and/or legal guardian. Patient and/or legal guardian verbalized an understanding and agreed to proceed. Anesthesia plan discussed with care team members and agreed upon.   MARGARITO Briceno CRNA  1/30/2020

## 2020-01-30 NOTE — OP NOTE
PATIENT:    Savannah Harrison    PREOPERATIVE DIAGNOSES:  1.   37w0d        2.preeclampsia (mild)        3. Previous     POSTOPERATIVE DIAGNOSES:  Same      PROCEDURE:     1.  Primary low transverse  section. SURGEON:     Naldo Samuels    ASSISTANT:    none      ESTIMATED BLOOD LOSS:   363FF      COMPLICATIONS:     None.         ANESTHESIA:    Spinal.         FINDINGS:   Live female        Normal tubes and ovaries bilaterally.         DETAILS OF PROCEDURE: After informed consent was obtained, patient was brought to the operating room. Spinal anesthesia was obtained without any complications by the anesthesia team. She was then placed in the supine position slightly tilted to the left. Abdomen was prepped and draped in the usual manner. Anesthesia level was checked and was found to be adequate. The abdomen was entered thru a pfannestiel skin incision, and carried down sharply to the fascia. The fascia was entered in the same plane as the skin incision and  from the underlying rectus abdominis muscles which were  in the midline exposing the parietal peritoneum. The peritoneum was opened sharply in a longitudinal fashion. A bladder retractor was placed. The lower uterine segment was opened in a low transverse fashion. The amniotic cavity was entered and Clear amniotic fluid was suctioned out. The baby was then delivered from the Cephalic . Cord was clamped and cut and the baby was handed over to the nursery nurse. Cord blood was saved. The placenta was then removed manually and the endometrial cavity was swept clean by a wet lap. The uterine incision was closed using a continuous locked suture of 0 vicryl. A second imbricated layer was placed. Tubes and ovaries were inspected and appeared to be normal. . The gutters were cleared of any blood clots and debris. The operative field appeared to be hemostatic.  Fascia closed with 0 vicryl, subcutaneous tissues re-approximated with interrupted 3.0 vicryl, and skin closed with 3-0 monocryl subcuticular stitch. Steristrips were applied followed by a sterile dressing and the patient was then transferred to the recovery room in good stable condition. All sponge needle and instrument counts found to be correct.       [address] [facility]

## 2020-01-30 NOTE — ANESTHESIA PROCEDURE NOTES
Spinal Block    Patient location during procedure: OR  Start time: 1/30/2020 2:32 PM  End time: 1/30/2020 2:35 PM  Reason for block: primary anesthetic  Staffing  Anesthesiologist: Prashant Amaya MD  Resident/CRNA: MARGARITO Sarmiento - CRNA  Performed: resident/CRNA   Preanesthetic Checklist  Completed: patient identified, site marked, surgical consent, pre-op evaluation, timeout performed, IV checked, risks and benefits discussed, monitors and equipment checked, anesthesia consent given, oxygen available and patient being monitored  Spinal Block  Patient position: sitting  Prep: ChloraPrep and site prepped and draped  Patient monitoring: cardiac monitor, continuous pulse ox and frequent blood pressure checks  Approach: midline  Location: L3/L4  Provider prep: mask and sterile gloves  Local infiltration: lidocaine  Agent: bupivacaine  Adjuvant: duramorph  Dose: 1.6  Dose: 1.6  Needle  Needle type: pencil-tip   Needle gauge: 25 G  Needle length: 3.5 in  Assessment  Sensory level: T4  Swirl obtained: Yes  CSF: clear  Attempts: 1  Hemodynamics: stable

## 2020-01-31 LAB
HCT VFR BLD CALC: 28.9 % (ref 37–47)
HEMOGLOBIN: 9.2 GM/DL (ref 12.5–16)
MCH RBC QN AUTO: 27.5 PG (ref 27–31)
MCHC RBC AUTO-ENTMCNC: 31.8 % (ref 32–36)
MCV RBC AUTO: 86.3 FL (ref 78–100)
PDW BLD-RTO: 15.5 % (ref 11.7–14.9)
PLATELET # BLD: 415 K/CU MM (ref 140–440)
PMV BLD AUTO: 9.6 FL (ref 7.5–11.1)
RBC # BLD: 3.35 M/CU MM (ref 4.2–5.4)
WBC # BLD: 18.2 K/CU MM (ref 4–10.5)

## 2020-01-31 PROCEDURE — 85027 COMPLETE CBC AUTOMATED: CPT

## 2020-01-31 PROCEDURE — 6370000000 HC RX 637 (ALT 250 FOR IP): Performed by: OBSTETRICS & GYNECOLOGY

## 2020-01-31 PROCEDURE — 6360000002 HC RX W HCPCS: Performed by: OBSTETRICS & GYNECOLOGY

## 2020-01-31 PROCEDURE — 1220000000 HC SEMI PRIVATE OB R&B

## 2020-01-31 PROCEDURE — 2580000003 HC RX 258: Performed by: OBSTETRICS & GYNECOLOGY

## 2020-01-31 PROCEDURE — 36415 COLL VENOUS BLD VENIPUNCTURE: CPT

## 2020-01-31 RX ORDER — IBUPROFEN 800 MG/1
800 TABLET ORAL EVERY 8 HOURS
Status: DISCONTINUED | OUTPATIENT
Start: 2020-01-31 | End: 2020-02-03 | Stop reason: HOSPADM

## 2020-01-31 RX ADMIN — SODIUM CHLORIDE, PRESERVATIVE FREE 10 ML: 5 INJECTION INTRAVENOUS at 08:10

## 2020-01-31 RX ADMIN — IBUPROFEN 800 MG: 200 TABLET, FILM COATED ORAL at 22:17

## 2020-01-31 RX ADMIN — FLUOXETINE 20 MG: 20 CAPSULE ORAL at 09:53

## 2020-01-31 RX ADMIN — OXYCODONE HYDROCHLORIDE 5 MG: 5 TABLET ORAL at 16:00

## 2020-01-31 RX ADMIN — DOCUSATE SODIUM 100 MG: 100 CAPSULE, LIQUID FILLED ORAL at 08:34

## 2020-01-31 RX ADMIN — ACETAMINOPHEN 1000 MG: 500 TABLET ORAL at 18:25

## 2020-01-31 RX ADMIN — CEFAZOLIN SODIUM 2 G: 2 INJECTION, SOLUTION INTRAVENOUS at 08:09

## 2020-01-31 RX ADMIN — ACETAMINOPHEN 1000 MG: 500 TABLET ORAL at 05:03

## 2020-01-31 RX ADMIN — DOCUSATE SODIUM 100 MG: 100 CAPSULE, LIQUID FILLED ORAL at 22:17

## 2020-01-31 RX ADMIN — IBUPROFEN 800 MG: 200 TABLET, FILM COATED ORAL at 11:00

## 2020-01-31 RX ADMIN — ACETAMINOPHEN 1000 MG: 500 TABLET ORAL at 11:00

## 2020-01-31 RX ADMIN — PRENATAL VIT W/ FE FUMARATE-FA TAB 27-0.8 MG 1 TABLET: 27-0.8 TAB at 22:16

## 2020-01-31 RX ADMIN — OXYCODONE HYDROCHLORIDE 10 MG: 5 TABLET ORAL at 22:17

## 2020-01-31 RX ADMIN — ENOXAPARIN SODIUM 60 MG: 60 INJECTION SUBCUTANEOUS at 08:34

## 2020-01-31 RX ADMIN — KETOROLAC TROMETHAMINE 30 MG: 30 INJECTION, SOLUTION INTRAMUSCULAR; INTRAVENOUS at 05:03

## 2020-01-31 RX ADMIN — IBUPROFEN 800 MG: 200 TABLET, FILM COATED ORAL at 19:20

## 2020-01-31 ASSESSMENT — PAIN SCALES - GENERAL
PAINLEVEL_OUTOF10: 0
PAINLEVEL_OUTOF10: 0
PAINLEVEL_OUTOF10: 6
PAINLEVEL_OUTOF10: 6
PAINLEVEL_OUTOF10: 7

## 2020-01-31 ASSESSMENT — PAIN DESCRIPTION - PAIN TYPE: TYPE: SURGICAL PAIN

## 2020-01-31 ASSESSMENT — PAIN DESCRIPTION - ORIENTATION
ORIENTATION: LOWER
ORIENTATION: LOWER

## 2020-01-31 ASSESSMENT — PAIN DESCRIPTION - DESCRIPTORS
DESCRIPTORS: SORE
DESCRIPTORS: ACHING;BURNING;CRAMPING

## 2020-01-31 ASSESSMENT — PAIN DESCRIPTION - FREQUENCY
FREQUENCY: INTERMITTENT
FREQUENCY: INTERMITTENT

## 2020-01-31 ASSESSMENT — PAIN DESCRIPTION - LOCATION
LOCATION: ABDOMEN
LOCATION: ABDOMEN

## 2020-01-31 ASSESSMENT — PAIN DESCRIPTION - PROGRESSION: CLINICAL_PROGRESSION: GRADUALLY WORSENING

## 2020-01-31 ASSESSMENT — PAIN DESCRIPTION - ONSET: ONSET: GRADUAL

## 2020-01-31 ASSESSMENT — PAIN - FUNCTIONAL ASSESSMENT: PAIN_FUNCTIONAL_ASSESSMENT: ACTIVITIES ARE NOT PREVENTED

## 2020-01-31 NOTE — PLAN OF CARE
Problem: Discharge Planning:  Goal: Discharged to appropriate level of care  Description  Discharged to appropriate level of care  1/31/2020 0749 by Lisa Nielsen RN  Outcome: Ongoing  1/31/2020 0011 by Damian Salas RN  Outcome: Ongoing     Problem: Fluid Volume - Imbalance:  Goal: Absence of postpartum hemorrhage signs and symptoms  Description  Absence of postpartum hemorrhage signs and symptoms  1/31/2020 0749 by Lisa Nielsen RN  Outcome: Ongoing  1/31/2020 0011 by Damian Salas RN  Outcome: Ongoing  Goal: Absence of imbalanced fluid volume signs and symptoms  Description  Absence of imbalanced fluid volume signs and symptoms  1/31/2020 0749 by Lisa Nielsen RN  Outcome: Ongoing  1/31/2020 0011 by Damian Salas RN  Outcome: Ongoing     Problem: Infection - Surgical Site:  Goal: Will show no infection signs and symptoms  Description  Will show no infection signs and symptoms  1/31/2020 0749 by Lisa Nielsen RN  Outcome: Ongoing  1/31/2020 0011 by Damian Salas RN  Outcome: Ongoing     Problem: Mood - Altered:  Goal: Mood stable  Description  Mood stable  1/31/2020 0749 by Lisa Nielsen RN  Outcome: Ongoing  1/31/2020 0011 by Damian Salas RN  Outcome: Ongoing     Problem: Nausea/Vomiting:  Goal: Absence of nausea/vomiting  Description  Absence of nausea/vomiting  1/31/2020 0749 by Lisa Nielsen RN  Outcome: Ongoing  1/31/2020 0011 by Damian Salas RN  Outcome: Ongoing     Problem: Pain - Acute:  Goal: Pain level will decrease  Description  Pain level will decrease  1/31/2020 0749 by Lisa Nielsen RN  Outcome: Ongoing  1/31/2020 0011 by Damian Salas RN  Outcome: Ongoing     Problem: Urinary Retention:  Goal: Urinary elimination within specified parameters  Description  Urinary elimination within specified parameters  1/31/2020 0749 by Lisa Nielsen RN  Outcome: Ongoing  1/31/2020 0011 by Damian Salas RN  Outcome: Ongoing     Problem: Venous Thromboembolism:  Goal: Will show no signs or symptoms of venous thromboembolism  Description  Will show no signs or symptoms of venous thromboembolism  1/31/2020 0749 by Mel Billings RN  Outcome: Ongoing  1/31/2020 0011 by Reymundo Booth RN  Outcome: Ongoing  Goal: Absence of signs or symptoms of impaired coagulation  Description  Absence of signs or symptoms of impaired coagulation  1/31/2020 0749 by Mel Billings RN  Outcome: Ongoing  1/31/2020 0011 by Reymundo Booth RN  Outcome: Ongoing

## 2020-01-31 NOTE — PLAN OF CARE
Problem: Discharge Planning:  Goal: Discharged to appropriate level of care  Outcome: Ongoing     Problem: Fluid Volume - Imbalance:  Goal: Absence of postpartum hemorrhage signs and symptoms  Outcome: Ongoing  Goal: Absence of imbalanced fluid volume signs and symptoms  Outcome: Ongoing     Problem: Infection - Surgical Site:  Goal: Will show no infection signs and symptoms  Outcome: Ongoing     Problem: Mood - Altered:  Goal: Mood stable  Outcome: Ongoing     Problem: Nausea/Vomiting:  Goal: Absence of nausea/vomiting  Outcome: Ongoing     Problem: Pain - Acute:  Goal: Pain level will decrease  Outcome: Ongoing     Problem: Urinary Retention:  Goal: Urinary elimination within specified parameters  Outcome: Ongoing     Problem: Venous Thromboembolism:  Goal: Will show no signs or symptoms of venous thromboembolism  Outcome: Ongoing  Goal: Absence of signs or symptoms of impaired coagulation  Outcome: Ongoing

## 2020-01-31 NOTE — ANESTHESIA POSTPROCEDURE EVALUATION
Department of Anesthesiology  Postprocedure Note    Patient: Jose Pearson  MRN: [id number]  YOB: 1995  Date of evaluation: 2020  Time:  10:00 PM     Procedure Summary     Date:  20 Room / Location:  [facility] OR [address] [place] [address]    Anesthesia Start:  1430 Anesthesia Stop:  3379    Procedure:   SECTION (N/A Abdomen) Diagnosis:  (repeat c/section pre-eclampsia)    Surgeon:  Lauren Champion MD Responsible Provider:  Marylen Bustle, MD    Anesthesia Type:  spinal ASA Status:  2          Anesthesia Type: spinal    Rajeev Phase I: Rajeev Score: 10    Rajeev Phase II:  10    Last vitals: Reviewed and per EMR flowsheets.        Anesthesia Post Evaluation    Patient location during evaluation: bedside  Patient participation: complete - patient participated  Level of consciousness: awake and alert  Pain score: 1  Airway patency: patent  Nausea & Vomiting: no nausea and no vomiting  Complications: no  Cardiovascular status: hemodynamically stable  Respiratory status: acceptable, nonlabored ventilation, spontaneous ventilation and room air  Hydration status: euvolemic

## 2020-02-01 PROCEDURE — 1220000000 HC SEMI PRIVATE OB R&B

## 2020-02-01 PROCEDURE — 6370000000 HC RX 637 (ALT 250 FOR IP): Performed by: OBSTETRICS & GYNECOLOGY

## 2020-02-01 PROCEDURE — 6360000002 HC RX W HCPCS: Performed by: OBSTETRICS & GYNECOLOGY

## 2020-02-01 PROCEDURE — 92586 HC EVOKED RESPONSE ABR P/F NEONATE: CPT

## 2020-02-01 PROCEDURE — 88720 BILIRUBIN TOTAL TRANSCUT: CPT

## 2020-02-01 RX ORDER — FLUOXETINE HYDROCHLORIDE 20 MG/1
40 CAPSULE ORAL DAILY
Status: DISCONTINUED | OUTPATIENT
Start: 2020-02-02 | End: 2020-02-03 | Stop reason: HOSPADM

## 2020-02-01 RX ADMIN — OXYCODONE HYDROCHLORIDE 10 MG: 5 TABLET ORAL at 07:51

## 2020-02-01 RX ADMIN — DOCUSATE SODIUM 100 MG: 100 CAPSULE, LIQUID FILLED ORAL at 21:19

## 2020-02-01 RX ADMIN — ACETAMINOPHEN 1000 MG: 500 TABLET ORAL at 05:20

## 2020-02-01 RX ADMIN — IBUPROFEN 800 MG: 200 TABLET, FILM COATED ORAL at 05:21

## 2020-02-01 RX ADMIN — PRENATAL VIT W/ FE FUMARATE-FA TAB 27-0.8 MG 1 TABLET: 27-0.8 TAB at 08:39

## 2020-02-01 RX ADMIN — MAGNESIUM HYDROXIDE 30 ML: 400 SUSPENSION ORAL at 08:39

## 2020-02-01 RX ADMIN — ACETAMINOPHEN 1000 MG: 500 TABLET ORAL at 21:19

## 2020-02-01 RX ADMIN — IBUPROFEN 800 MG: 200 TABLET, FILM COATED ORAL at 13:38

## 2020-02-01 RX ADMIN — ENOXAPARIN SODIUM 60 MG: 60 INJECTION SUBCUTANEOUS at 08:39

## 2020-02-01 RX ADMIN — OXYCODONE HYDROCHLORIDE 10 MG: 5 TABLET ORAL at 13:39

## 2020-02-01 RX ADMIN — OXYCODONE HYDROCHLORIDE 5 MG: 5 TABLET ORAL at 21:20

## 2020-02-01 RX ADMIN — ACETAMINOPHEN 1000 MG: 500 TABLET ORAL at 13:39

## 2020-02-01 RX ADMIN — DOCUSATE SODIUM 100 MG: 100 CAPSULE, LIQUID FILLED ORAL at 08:39

## 2020-02-01 ASSESSMENT — PAIN DESCRIPTION - FREQUENCY
FREQUENCY: INTERMITTENT
FREQUENCY: INTERMITTENT

## 2020-02-01 ASSESSMENT — PAIN DESCRIPTION - LOCATION
LOCATION: ABDOMEN
LOCATION: ABDOMEN

## 2020-02-01 ASSESSMENT — PAIN DESCRIPTION - ONSET
ONSET: ON-GOING
ONSET: GRADUAL

## 2020-02-01 ASSESSMENT — PAIN DESCRIPTION - PAIN TYPE
TYPE: ACUTE PAIN;SURGICAL PAIN
TYPE: SURGICAL PAIN

## 2020-02-01 ASSESSMENT — PAIN DESCRIPTION - PROGRESSION
CLINICAL_PROGRESSION: GRADUALLY IMPROVING
CLINICAL_PROGRESSION: GRADUALLY IMPROVING

## 2020-02-01 ASSESSMENT — PAIN SCALES - GENERAL
PAINLEVEL_OUTOF10: 0
PAINLEVEL_OUTOF10: 0
PAINLEVEL_OUTOF10: 5
PAINLEVEL_OUTOF10: 2
PAINLEVEL_OUTOF10: 7
PAINLEVEL_OUTOF10: 7
PAINLEVEL_OUTOF10: 4

## 2020-02-01 ASSESSMENT — PAIN DESCRIPTION - DESCRIPTORS
DESCRIPTORS: CRAMPING;SORE
DESCRIPTORS: BURNING;CRAMPING

## 2020-02-01 ASSESSMENT — PAIN DESCRIPTION - ORIENTATION
ORIENTATION: LOWER
ORIENTATION: LOWER

## 2020-02-01 ASSESSMENT — PAIN - FUNCTIONAL ASSESSMENT
PAIN_FUNCTIONAL_ASSESSMENT: ACTIVITIES ARE NOT PREVENTED
PAIN_FUNCTIONAL_ASSESSMENT: ACTIVITIES ARE NOT PREVENTED

## 2020-02-02 PROCEDURE — 6370000000 HC RX 637 (ALT 250 FOR IP): Performed by: OBSTETRICS & GYNECOLOGY

## 2020-02-02 PROCEDURE — 1220000000 HC SEMI PRIVATE OB R&B

## 2020-02-02 PROCEDURE — 6360000002 HC RX W HCPCS: Performed by: OBSTETRICS & GYNECOLOGY

## 2020-02-02 RX ORDER — IBUPROFEN 800 MG/1
800 TABLET ORAL EVERY 8 HOURS
Qty: 120 TABLET | Refills: 3 | Status: ON HOLD | OUTPATIENT
Start: 2020-02-02 | End: 2021-05-06

## 2020-02-02 RX ORDER — FLUOXETINE HYDROCHLORIDE 40 MG/1
40 CAPSULE ORAL DAILY
Qty: 30 CAPSULE | Refills: 3 | Status: ON HOLD | OUTPATIENT
Start: 2020-02-02 | End: 2021-05-06

## 2020-02-02 RX ORDER — LABETALOL 100 MG/1
100 TABLET, FILM COATED ORAL EVERY 12 HOURS SCHEDULED
Status: DISCONTINUED | OUTPATIENT
Start: 2020-02-02 | End: 2020-02-03 | Stop reason: HOSPADM

## 2020-02-02 RX ORDER — OXYCODONE HYDROCHLORIDE 5 MG/1
5 TABLET ORAL EVERY 6 HOURS PRN
Qty: 20 TABLET | Refills: 0 | Status: SHIPPED | OUTPATIENT
Start: 2020-02-02 | End: 2020-02-07

## 2020-02-02 RX ORDER — LABETALOL 100 MG/1
100 TABLET, FILM COATED ORAL ONCE
Status: COMPLETED | OUTPATIENT
Start: 2020-02-02 | End: 2020-02-02

## 2020-02-02 RX ADMIN — LABETALOL HYDROCHLORIDE 100 MG: 100 TABLET, FILM COATED ORAL at 21:22

## 2020-02-02 RX ADMIN — ENOXAPARIN SODIUM 60 MG: 60 INJECTION SUBCUTANEOUS at 10:23

## 2020-02-02 RX ADMIN — ACETAMINOPHEN 1000 MG: 500 TABLET ORAL at 17:05

## 2020-02-02 RX ADMIN — OXYCODONE HYDROCHLORIDE 10 MG: 5 TABLET ORAL at 14:29

## 2020-02-02 RX ADMIN — SIMETHICONE CHEW TAB 80 MG 80 MG: 80 TABLET ORAL at 10:00

## 2020-02-02 RX ADMIN — OXYCODONE HYDROCHLORIDE 5 MG: 5 TABLET ORAL at 23:41

## 2020-02-02 RX ADMIN — PRENATAL VIT W/ FE FUMARATE-FA TAB 27-0.8 MG 1 TABLET: 27-0.8 TAB at 10:23

## 2020-02-02 RX ADMIN — OXYCODONE HYDROCHLORIDE 10 MG: 5 TABLET ORAL at 01:20

## 2020-02-02 RX ADMIN — DOCUSATE SODIUM 100 MG: 100 CAPSULE, LIQUID FILLED ORAL at 10:23

## 2020-02-02 RX ADMIN — ACETAMINOPHEN 1000 MG: 500 TABLET ORAL at 23:41

## 2020-02-02 RX ADMIN — DOCUSATE SODIUM 100 MG: 100 CAPSULE, LIQUID FILLED ORAL at 21:22

## 2020-02-02 RX ADMIN — FLUOXETINE 40 MG: 20 CAPSULE ORAL at 10:22

## 2020-02-02 RX ADMIN — LABETALOL HYDROCHLORIDE 100 MG: 100 TABLET, FILM COATED ORAL at 17:05

## 2020-02-02 RX ADMIN — IBUPROFEN 800 MG: 200 TABLET, FILM COATED ORAL at 01:20

## 2020-02-02 RX ADMIN — SIMETHICONE CHEW TAB 80 MG 80 MG: 80 TABLET ORAL at 17:05

## 2020-02-02 RX ADMIN — OXYCODONE HYDROCHLORIDE 10 MG: 5 TABLET ORAL at 19:05

## 2020-02-02 RX ADMIN — IBUPROFEN 800 MG: 200 TABLET, FILM COATED ORAL at 23:41

## 2020-02-02 RX ADMIN — ACETAMINOPHEN 1000 MG: 500 TABLET ORAL at 10:22

## 2020-02-02 RX ADMIN — OXYCODONE HYDROCHLORIDE 10 MG: 5 TABLET ORAL at 10:25

## 2020-02-02 RX ADMIN — IBUPROFEN 800 MG: 200 TABLET, FILM COATED ORAL at 14:29

## 2020-02-02 ASSESSMENT — PAIN SCALES - GENERAL
PAINLEVEL_OUTOF10: 3
PAINLEVEL_OUTOF10: 7
PAINLEVEL_OUTOF10: 7
PAINLEVEL_OUTOF10: 6
PAINLEVEL_OUTOF10: 3
PAINLEVEL_OUTOF10: 7
PAINLEVEL_OUTOF10: 5
PAINLEVEL_OUTOF10: 7
PAINLEVEL_OUTOF10: 7
PAINLEVEL_OUTOF10: 3
PAINLEVEL_OUTOF10: 3

## 2020-02-02 ASSESSMENT — PAIN DESCRIPTION - LOCATION
LOCATION: ABDOMEN

## 2020-02-02 ASSESSMENT — PAIN DESCRIPTION - ORIENTATION
ORIENTATION: LOWER

## 2020-02-02 ASSESSMENT — PAIN DESCRIPTION - ONSET
ONSET: GRADUAL

## 2020-02-02 ASSESSMENT — PAIN DESCRIPTION - DESCRIPTORS
DESCRIPTORS: BURNING

## 2020-02-02 ASSESSMENT — PAIN DESCRIPTION - PAIN TYPE
TYPE: ACUTE PAIN;SURGICAL PAIN
TYPE: ACUTE PAIN
TYPE: ACUTE PAIN;SURGICAL PAIN

## 2020-02-02 ASSESSMENT — PAIN - FUNCTIONAL ASSESSMENT
PAIN_FUNCTIONAL_ASSESSMENT: ACTIVITIES ARE NOT PREVENTED

## 2020-02-02 ASSESSMENT — PAIN DESCRIPTION - FREQUENCY
FREQUENCY: CONTINUOUS

## 2020-02-02 ASSESSMENT — PAIN DESCRIPTION - PROGRESSION
CLINICAL_PROGRESSION: GRADUALLY WORSENING
CLINICAL_PROGRESSION: GRADUALLY WORSENING
CLINICAL_PROGRESSION: GRADUALLY IMPROVING
CLINICAL_PROGRESSION: GRADUALLY IMPROVING
CLINICAL_PROGRESSION: GRADUALLY WORSENING
CLINICAL_PROGRESSION: GRADUALLY WORSENING
CLINICAL_PROGRESSION: GRADUALLY IMPROVING
CLINICAL_PROGRESSION: GRADUALLY WORSENING
CLINICAL_PROGRESSION: GRADUALLY IMPROVING

## 2020-02-02 NOTE — PLAN OF CARE
Problem: Discharge Planning:  Goal: Discharged to appropriate level of care  Description  Discharged to appropriate level of care  2/2/2020 0823 by Moo Robb RN  Outcome: Completed  2/2/2020 0820 by Moo Robb, RN  Outcome: Ongoing

## 2020-02-03 VITALS
TEMPERATURE: 98.2 F | HEART RATE: 94 BPM | DIASTOLIC BLOOD PRESSURE: 81 MMHG | OXYGEN SATURATION: 100 % | WEIGHT: 226 LBS | SYSTOLIC BLOOD PRESSURE: 140 MMHG | HEIGHT: 63 IN | RESPIRATION RATE: 18 BRPM | BODY MASS INDEX: 40.04 KG/M2

## 2020-02-03 PROCEDURE — 6360000002 HC RX W HCPCS: Performed by: OBSTETRICS & GYNECOLOGY

## 2020-02-03 PROCEDURE — 6370000000 HC RX 637 (ALT 250 FOR IP): Performed by: OBSTETRICS & GYNECOLOGY

## 2020-02-03 RX ORDER — LABETALOL 100 MG/1
100 TABLET, FILM COATED ORAL EVERY 12 HOURS SCHEDULED
Qty: 60 TABLET | Refills: 3 | Status: ON HOLD | OUTPATIENT
Start: 2020-02-03 | End: 2021-05-06

## 2020-02-03 RX ADMIN — PRENATAL VIT W/ FE FUMARATE-FA TAB 27-0.8 MG 1 TABLET: 27-0.8 TAB at 08:29

## 2020-02-03 RX ADMIN — FLUOXETINE 40 MG: 20 CAPSULE ORAL at 10:46

## 2020-02-03 RX ADMIN — OXYCODONE HYDROCHLORIDE 10 MG: 5 TABLET ORAL at 13:09

## 2020-02-03 RX ADMIN — DOCUSATE SODIUM 100 MG: 100 CAPSULE, LIQUID FILLED ORAL at 08:30

## 2020-02-03 RX ADMIN — IBUPROFEN 800 MG: 200 TABLET, FILM COATED ORAL at 08:31

## 2020-02-03 RX ADMIN — OXYCODONE HYDROCHLORIDE 10 MG: 5 TABLET ORAL at 08:32

## 2020-02-03 RX ADMIN — LABETALOL HYDROCHLORIDE 100 MG: 100 TABLET, FILM COATED ORAL at 08:43

## 2020-02-03 RX ADMIN — ENOXAPARIN SODIUM 60 MG: 60 INJECTION SUBCUTANEOUS at 08:37

## 2020-02-03 RX ADMIN — SIMETHICONE CHEW TAB 80 MG 80 MG: 80 TABLET ORAL at 08:30

## 2020-02-03 ASSESSMENT — PAIN DESCRIPTION - PAIN TYPE: TYPE: ACUTE PAIN;SURGICAL PAIN

## 2020-02-03 ASSESSMENT — PAIN DESCRIPTION - ORIENTATION: ORIENTATION: LOWER

## 2020-02-03 ASSESSMENT — PAIN DESCRIPTION - DESCRIPTORS: DESCRIPTORS: BURNING;ACHING

## 2020-02-03 ASSESSMENT — PAIN SCALES - GENERAL
PAINLEVEL_OUTOF10: 0
PAINLEVEL_OUTOF10: 9
PAINLEVEL_OUTOF10: 0
PAINLEVEL_OUTOF10: 8
PAINLEVEL_OUTOF10: 8
PAINLEVEL_OUTOF10: 3

## 2020-02-03 ASSESSMENT — PAIN - FUNCTIONAL ASSESSMENT: PAIN_FUNCTIONAL_ASSESSMENT: ACTIVITIES ARE NOT PREVENTED

## 2020-02-03 ASSESSMENT — PAIN DESCRIPTION - FREQUENCY: FREQUENCY: INTERMITTENT

## 2020-02-03 ASSESSMENT — PAIN DESCRIPTION - ONSET: ONSET: GRADUAL

## 2020-02-03 ASSESSMENT — PAIN DESCRIPTION - LOCATION: LOCATION: ABDOMEN

## 2020-02-03 NOTE — DISCHARGE SUMMARY
2020    HGB 9.2 (L) 2020    HCT 28.9 (L) 2020    MCV 86.3 2020     2020       General:    AO, no acute distress       Lochia:  appropriate   Uterine    firm   Incision:  healing well,no significant drainage,no dehiscence,no significant erythema   DVT Evaluation:  no calf tenderness     Assessment:     Status post  section, POD # 1 Doing well postoperatively. Gestational hypertension, bp improved with labetalol    Plan:     Continue plan of care    Leah Jordan  2/3/2020  11:04 AM Obstetrical Discharge Form    Gestational Age:  37w0d    Antepartum complications: gestational hypertension    Date of Delivery:    20    Type of Delivery:    for repeat    Delivered By:                 Baby:       Information for the patient's :  Isac Nuñez [[id number]]   Birthweight: 6 lb 9.2 oz (2.982 kg)(2980 grams)    Anesthesia:    Spinal    Intrapartum complications: None    Postpartum complications: gestational hypertension    Condition at discharge: Good/stable      Discharge Date:  2/3/20     Plan:   Labetalol 100mg po bid  follow up in 1 week.

## 2020-07-06 ENCOUNTER — HOSPITAL ENCOUNTER (EMERGENCY)
Age: 25
Discharge: LEFT BEFORE DISCHARGE | End: 2020-07-06
Payer: COMMERCIAL

## 2020-07-06 VITALS
HEART RATE: 74 BPM | RESPIRATION RATE: 16 BRPM | OXYGEN SATURATION: 99 % | DIASTOLIC BLOOD PRESSURE: 98 MMHG | SYSTOLIC BLOOD PRESSURE: 143 MMHG | TEMPERATURE: 98.7 F

## 2020-07-06 PROCEDURE — 4500000002 HC ER NO CHARGE

## 2020-07-06 ASSESSMENT — PAIN SCALES - GENERAL: PAINLEVEL_OUTOF10: 2

## 2020-07-06 NOTE — ED NOTES
Pt states she would like to sign out. Paperwork signed and pt seen ambulating to vehicle.       Pam Zimmer RN  07/06/20 [id number]

## 2020-12-31 ENCOUNTER — OFFICE VISIT (OUTPATIENT)
Dept: PRIMARY CARE CLINIC | Age: 25
End: 2020-12-31
Payer: COMMERCIAL

## 2020-12-31 ENCOUNTER — HOSPITAL ENCOUNTER (OUTPATIENT)
Age: 25
Setting detail: SPECIMEN
Discharge: HOME | End: 2020-12-31
Payer: COMMERCIAL

## 2020-12-31 VITALS — RESPIRATION RATE: 16 BRPM | HEART RATE: 86 BPM | TEMPERATURE: 97.6 F | OXYGEN SATURATION: 97 %

## 2020-12-31 LAB — S PYO AG THROAT QL: NORMAL

## 2020-12-31 PROCEDURE — G8428 CUR MEDS NOT DOCUMENT: HCPCS | Performed by: NURSE PRACTITIONER

## 2020-12-31 PROCEDURE — G8417 CALC BMI ABV UP PARAM F/U: HCPCS | Performed by: NURSE PRACTITIONER

## 2020-12-31 PROCEDURE — G8484 FLU IMMUNIZE NO ADMIN: HCPCS | Performed by: NURSE PRACTITIONER

## 2020-12-31 PROCEDURE — 99213 OFFICE O/P EST LOW 20 MIN: CPT | Performed by: NURSE PRACTITIONER

## 2020-12-31 PROCEDURE — U0002 COVID-19 LAB TEST NON-CDC: HCPCS

## 2020-12-31 PROCEDURE — 87880 STREP A ASSAY W/OPTIC: CPT | Performed by: NURSE PRACTITIONER

## 2020-12-31 PROCEDURE — 4004F PT TOBACCO SCREEN RCVD TLK: CPT | Performed by: NURSE PRACTITIONER

## 2020-12-31 NOTE — PROGRESS NOTES
12/31/2020    HPI:  Chief complaint and history of present illness as per medical assistant/nurse documented today in the Flu/COVID-19 clinic. She c/o a 1 week history of non-productive cough, chest congestion, sob at times, tolerable headache, fatigue, sore throat, hoarse voice, and \"swollen lymph nodes\". No known close exposure. No distress, speaks in full sentences, no wheezing, resps easy and reg. MEDICATIONS:  Prior to Visit Medications    Medication Sig Taking? Authorizing Provider   labetalol (NORMODYNE) 100 MG tablet Take 1 tablet by mouth every 12 hours  Raeann Simeon MD   ibuprofen (ADVIL;MOTRIN) 800 MG tablet Take 1 tablet by mouth every 8 hours  Chasity Chairez MD   FLUoxetine (PROZAC) 40 MG capsule Take 1 capsule by mouth daily  Chasity Chairez MD   albuterol sulfate  (90 Base) MCG/ACT inhaler INHALE 2 PUFFS PO EVERY 4-6 HOURS PRN  Historical Provider, MD   Prenatal MV-Min-Fe Fum-FA-DHA (PRENATAL 1) 30-0.975-200 MG CAPS Take 1 tablet by mouth daily  Joseph Otto MD           PHYSICAL EXAM:  Physical Exam  Vitals signs and nursing note reviewed. Constitutional:       General: She is not in acute distress. Appearance: Normal appearance. She is well-developed. She is not ill-appearing, toxic-appearing or diaphoretic. HENT:      Head: Normocephalic and atraumatic. Right Ear: Tympanic membrane, ear canal and external ear normal.      Left Ear: Tympanic membrane, ear canal and external ear normal.      Nose: Congestion present. Mouth/Throat:      Mouth: Mucous membranes are moist.      Pharynx: Posterior oropharyngeal erythema present. No oropharyngeal exudate. Eyes:      Extraocular Movements: Extraocular movements intact. Conjunctiva/sclera: Conjunctivae normal.      Pupils: Pupils are equal, round, and reactive to light. Neck:      Musculoskeletal: Full passive range of motion without pain, normal range of motion and neck supple.  No neck rigidity or breathing.   - RTO if sxs increase or no improvement.   - POCT rapid strep A      FOLLOW-UP:  Your COVID 19 test can take 3-5 days for the results come back. We ask that you make a Mychart page and view your test results this way. You will need to Self quarantine until you know your results. Increase fluids rest  Saline nasal spray as directed  Warm salt gargles for throat discomfort  Monitor temperature twice a day  Tylenol for fevers and/or discomfort. If symptoms are worse -Go to the ER. Follow up with your primary doctor    To Whom it May Concern:    Royce John has been tested for COVID on 12/31/20. They may NOT return to work until their lab test results back and they been fever free for 3 days. If test is positive they must stay home for 2 weeks or until they test negative or as directed by the [facility] Department. Return if symptoms worsen or fail to improve.     In addition to other information, the printed after visit summary provided to the patient includes:  [x] COVID-19 Self care instructions  [x] COVID-19 General patient information

## 2020-12-31 NOTE — PATIENT INSTRUCTIONS
Your COVID 19 test can take 3-5 days for the results come back. We ask that you make a Mychart page and view your test results this way. You will need to Self quarantine until you know your results. Increase fluids rest  Saline nasal spray as directed  Warm salt gargles for throat discomfort  Monitor temperature twice a day  Tylenol for fevers and/or discomfort. If symptoms are worse -Go to the ER. Follow up with your primary doctor    To Whom it May Concern:    Liz Esteban has been tested for COVID on 12/31/20. They may NOT return to work until their lab test results back and they been fever free for 3 days. If test is positive they must stay home for 2 weeks or until they test negative or as directed by the [facility] Department. Your COVID 19 test can take 3-5 days for the results come back. We ask that you make a Mychart page and view your test results this way. You will need to Self quarantine until you know your results. Increase fluids rest  Saline nasal spray as directed  Warm salt gargles for throat discomfort  Monitor temperature twice a day  Tylenol for fevers and/or discomfort. If symptoms are worse -Go to the ER. Follow up with your primary doctor    To Whom it May Concern:    Liz Esteban has been tested for COVID on 12/31/20. They may NOT return to work until their lab test results back and they been fever free for 3 days. If test is positive they must stay home for 2 weeks or until they test negative or as directed by the [facility] Department.

## 2021-01-02 LAB
SARS-COV-2: NOT DETECTED
SOURCE: NORMAL

## 2021-02-11 ENCOUNTER — HOSPITAL ENCOUNTER (OUTPATIENT)
Age: 26
Setting detail: SPECIMEN
Discharge: HOME | End: 2021-02-11
Payer: COMMERCIAL

## 2021-02-11 ENCOUNTER — OFFICE VISIT (OUTPATIENT)
Dept: PRIMARY CARE CLINIC | Age: 26
End: 2021-02-11
Payer: COMMERCIAL

## 2021-02-11 VITALS — TEMPERATURE: 97 F

## 2021-02-11 DIAGNOSIS — F17.210 CIGARETTE SMOKER: ICD-10-CM

## 2021-02-11 DIAGNOSIS — Z20.822 CLOSE EXPOSURE TO COVID-19 VIRUS: ICD-10-CM

## 2021-02-11 DIAGNOSIS — R68.89 FLU-LIKE SYMPTOMS: Primary | ICD-10-CM

## 2021-02-11 LAB
SARS-COV-2: DETECTED
SOURCE: ABNORMAL

## 2021-02-11 PROCEDURE — 99213 OFFICE O/P EST LOW 20 MIN: CPT | Performed by: PHYSICIAN ASSISTANT

## 2021-02-11 PROCEDURE — U0002 COVID-19 LAB TEST NON-CDC: HCPCS

## 2021-02-11 PROCEDURE — G8427 DOCREV CUR MEDS BY ELIG CLIN: HCPCS | Performed by: PHYSICIAN ASSISTANT

## 2021-02-11 PROCEDURE — 4004F PT TOBACCO SCREEN RCVD TLK: CPT | Performed by: PHYSICIAN ASSISTANT

## 2021-02-11 PROCEDURE — G8484 FLU IMMUNIZE NO ADMIN: HCPCS | Performed by: PHYSICIAN ASSISTANT

## 2021-02-11 PROCEDURE — G8421 BMI NOT CALCULATED: HCPCS | Performed by: PHYSICIAN ASSISTANT

## 2021-02-11 NOTE — PROGRESS NOTES
2/11/2021    HPI:  Chief complaint and history of present illness as per medical assistant/nurse documented today in the Flu/COVID-19 clinic. 4-day history of chills, cough, nasal congestion, intermittent shortness of breath, tolerable headache, intermittent diarrhea, fatigue, and intermittent \"clammy feeling. She states that she mainly feels clammy when chills are present. She denies any known fever. She denies chest pain, chest tightness, chest heaviness, nausea, vomiting, bloody or black stools, loss of taste or smell. She reports a history of well-controlled asthma. She has been using her albuterol inhaler as needed. She reports good p.o. intake and urine output. She has been in close contact with her aunt who currently has COVID-19. 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 She states a few days of work this week due to illness. MEDICATIONS:  Prior to Visit Medications    Medication Sig Taking?  Authorizing Provider   labetalol (NORMODYNE) 100 MG tablet Take 1 tablet by mouth every 12 hours  Kristyn Byrne MD   ibuprofen (ADVIL;MOTRIN) 800 MG tablet Take 1 tablet by mouth every 8 hours  Noelle Best MD   FLUoxetine (PROZAC) 40 MG capsule Take 1 capsule by mouth daily  Noelle Best MD   albuterol sulfate  (90 Base) MCG/ACT inhaler INHALE 2 PUFFS PO EVERY 4-6 HOURS PRN  Historical Provider, MD   Prenatal MV-Min-Fe Fum-FA-DHA (PRENATAL 1) 30-0.975-200 MG CAPS Take 1 tablet by mouth daily  Erlin Brown MD       Allergies   Allergen Reactions    Gold Rash     Gold jewelry    Silver Rash     Silver jewelry   ,   Past Medical History:   Diagnosis Date    Anxiety disorder     Asthma     Depression 2015       PHYSICAL EXAM:  Physical Exam  Temp:  97 F  Heart Rate:  101    Pulse Ox:  97%    Constitutional:  Well developed, well nourished  HENT:  Normocephalic, atraumatic, bilateral external ears normal, oropharynx moist, pnd, nasal mucosa congested  Eyes:  conjunctiva normal, no discharge, no scleral icterus  Cardiovascular:  Normal heart rate, normal rhythm, no murmurs, gallops or rubs  Thorax & Lungs:  Normal breath sounds, no respiratory distress, no wheezing  Skin:  Warm, dry, no erythema, no rash  Neurologic:  Alert & oriented   Psychiatric:  Affect normal, mood normal    ASSESSMENT/PLAN:  1. Flu-like symptoms    2. Close exposure to COVID-19 virus    3. Cigarette smoker    Isolate at home  Smoking cessation encouraged  Increase fluids and rest  Saline nasal spray as needed for nasal congestion  Warm salt gargles as needed for throat discomfort  Monitor temperature twice a day  Tylenol as needed for fevers and/or discomfort. Big deep breaths periodically throughout the day  Lay on belly or side when sleeping  Regular Mucinex over the counter as needed for chest congestion  If symptoms worsen -Go to the ER. Follow up with your primary care provider    FOLLOW-UP:  No follow-ups on file.     In addition to other information, the printed after visit summary provided to the patient includes:  [x] COVID-19 Self care instructions  [x] COVID-19 General patient information    [facility]

## 2021-02-11 NOTE — PATIENT INSTRUCTIONS
Your COVID 19 test can take 3-5 days for the results to come back. We ask that you make a Mychart page and view your test results this way. You will need to Self quarantine until you know your results. Smoking cessation encouraged  Increase fluids and rest  Saline nasal spray as needed for nasal congestion  Warm salt gargles as needed for throat discomfort  Monitor temperature twice a day  Tylenol as needed for fevers and/or discomfort. Big deep breaths periodically throughout the day  Lay on belly or side when sleeping  Regular Mucinex over the counter as needed for chest congestion  If symptoms worsen -Go to the ER. Follow up with your primary care provider      To Whom it May Concern:    Reji Cantrell was tested for COVID-19 2/11/2021. He/she must stay home until test results are back. If test is positive, he/she must quarantine for a total of 10 days starting from day one of symptom onset. He/she must also be fever-free for 24 hours at that time, and also have improvement in symptoms. We do not recommend retesting as patients may continue to test positive for months even though no longer contagious. It is suggested you call [address] [facility] or [facility] with any questions regarding quarantine timeframe/return to work/school details. I understand that Shital Elliott is currently working from home but due to illness, she will have days this week, and possibly next week, where she may not be able to perform her work duties. Please excuse any absences from work this week and next week if needed.       Bonnie Boone PA-C

## 2021-02-11 NOTE — PROGRESS NOTES
2/11/21  Savannah Harrison  1995    FLU/COVID-19 CLINIC EVALUATION    HPI SYMPTOMS:    [facility] [employer]     [] Fevers  [x] Chills  [x] Cough  [] Coughing up blood  [] Chest Congestion  [x] Nasal Congestion  [x] Feeling short of breath  [] Sometimes  [x] Frequently  [] All the time  [] Chest pain  [x] Headaches  [x]Tolerable  [] Severe  [] Sore throat  [] Muscle aches  [] Nausea  [] Vomiting  []Unable to keep fluids down  [x] Diarrhea  []Severe    [x] OTHER SYMPTOMS:  Fatigue  \"Clammy feeling\"    Symptom Duration:   [] 1  [] 2   [] 3   [x] 4    [] 5   [] 6   [] 7   [] 8   [] 9   [] 10   [] 11   [] 12   [] 13   [] 14   [] Longer than 14 days    Symptom course:   [] Worsening     [x] Stable     [] Improving    RISK FACTORS:    [] Pregnant or possibly pregnant  [] Age over 61  [] Diabetes  [] Heart disease  [x] Asthma  [] COPD/Other chronic lung diseases  [] Active Cancer  [] On Chemotherapy  [] Taking oral steroids  [] History Lymphoma/Leukemia  [x] Close contact with a lab confirmed COVID-19 patient within 14 days of symptom onset-Aunt   [] History of travel from affected geographical areas within 14 days of symptom onset       VITALS:  There were no vitals filed for this visit. TESTS:    POCT FLU:  [] Positive     []Negative    ASSESSMENT:    [] Flu  [] Possible COVID-19  [] Strep    PLAN:    [] Discharge home with written instructions for:  [] Flu management  [] Possible COVID-19 infection with self-quarantine and management of symptoms  [] Follow-up with primary care physician or emergency department if worsens  [] Evaluation per physician/NP/PA in clinic  [] Sent to ER       An  electronic signature was used to authenticate this note.      --Dilia Puentes on 2/11/2021 at 11:10 AM

## 2021-02-27 ENCOUNTER — HOSPITAL ENCOUNTER (EMERGENCY)
Age: 26
Discharge: HOME | End: 2021-02-27
Payer: COMMERCIAL

## 2021-02-27 ENCOUNTER — APPOINTMENT (OUTPATIENT)
Dept: ULTRASOUND IMAGING | Age: 26
End: 2021-02-27
Payer: COMMERCIAL

## 2021-02-27 VITALS
SYSTOLIC BLOOD PRESSURE: 132 MMHG | WEIGHT: 225 LBS | HEIGHT: 63 IN | OXYGEN SATURATION: 100 % | TEMPERATURE: 98.6 F | BODY MASS INDEX: 39.87 KG/M2 | DIASTOLIC BLOOD PRESSURE: 82 MMHG | HEART RATE: 90 BPM | RESPIRATION RATE: 16 BRPM

## 2021-02-27 DIAGNOSIS — O30.001 TWIN GESTATION IN FIRST TRIMESTER, UNSPECIFIED MULTIPLE GESTATION TYPE: ICD-10-CM

## 2021-02-27 DIAGNOSIS — O46.90 VAGINAL BLEEDING IN PREGNANCY: Primary | ICD-10-CM

## 2021-02-27 LAB
ABO/RH: NORMAL
ALBUMIN SERPL-MCNC: 4.2 GM/DL (ref 3.4–5)
ALP BLD-CCNC: 66 IU/L (ref 40–128)
ALT SERPL-CCNC: 13 U/L (ref 10–40)
ANION GAP SERPL CALCULATED.3IONS-SCNC: 8 MMOL/L (ref 4–16)
AST SERPL-CCNC: 15 IU/L (ref 15–37)
BACTERIA: NEGATIVE /HPF
BASOPHILS ABSOLUTE: 0.1 K/CU MM
BASOPHILS RELATIVE PERCENT: 0.8 % (ref 0–1)
BILIRUB SERPL-MCNC: 0.4 MG/DL (ref 0–1)
BILIRUBIN URINE: NEGATIVE MG/DL
BLOOD, URINE: ABNORMAL
BUN BLDV-MCNC: 8 MG/DL (ref 6–23)
CALCIUM SERPL-MCNC: 8.9 MG/DL (ref 8.3–10.6)
CHLORIDE BLD-SCNC: 101 MMOL/L (ref 99–110)
CLARITY: ABNORMAL
CO2: 25 MMOL/L (ref 21–32)
COLOR: YELLOW
CREAT SERPL-MCNC: 0.6 MG/DL (ref 0.6–1.1)
DIFFERENTIAL TYPE: ABNORMAL
EOSINOPHILS ABSOLUTE: 0.2 K/CU MM
EOSINOPHILS RELATIVE PERCENT: 1.8 % (ref 0–3)
GFR AFRICAN AMERICAN: >60 ML/MIN/1.73M2
GFR NON-AFRICAN AMERICAN: >60 ML/MIN/1.73M2
GLUCOSE BLD-MCNC: 96 MG/DL (ref 70–99)
GLUCOSE, URINE: NEGATIVE MG/DL
GONADOTROPIN, CHORIONIC (HCG) QUANT: 9265 UIU/ML
HCT VFR BLD CALC: 40.9 % (ref 37–47)
HEMOGLOBIN: 13.5 GM/DL (ref 12.5–16)
IMMATURE NEUTROPHIL %: 0.4 % (ref 0–0.43)
KETONES, URINE: NEGATIVE MG/DL
LEUKOCYTE ESTERASE, URINE: ABNORMAL
LYMPHOCYTES ABSOLUTE: 1.6 K/CU MM
LYMPHOCYTES RELATIVE PERCENT: 17.6 % (ref 24–44)
MCH RBC QN AUTO: 27.7 PG (ref 27–31)
MCHC RBC AUTO-ENTMCNC: 33 % (ref 32–36)
MCV RBC AUTO: 84 FL (ref 78–100)
MONOCYTES ABSOLUTE: 0.6 K/CU MM
MONOCYTES RELATIVE PERCENT: 6.6 % (ref 0–4)
MUCUS: ABNORMAL HPF
NITRITE URINE, QUANTITATIVE: NEGATIVE
NUCLEATED RBC %: 0 %
PDW BLD-RTO: 15.6 % (ref 11.7–14.9)
PH, URINE: 6 (ref 5–8)
PLATELET # BLD: 383 K/CU MM (ref 140–440)
PMV BLD AUTO: 8.7 FL (ref 7.5–11.1)
POTASSIUM SERPL-SCNC: 3.7 MMOL/L (ref 3.5–5.1)
PROTEIN UA: 30 MG/DL
RBC # BLD: 4.87 M/CU MM (ref 4.2–5.4)
RBC URINE: 1 /HPF (ref 0–6)
SEGMENTED NEUTROPHILS ABSOLUTE COUNT: 6.5 K/CU MM
SEGMENTED NEUTROPHILS RELATIVE PERCENT: 72.8 % (ref 36–66)
SODIUM BLD-SCNC: 134 MMOL/L (ref 135–145)
SPECIFIC GRAVITY UA: 1.02 (ref 1–1.03)
SQUAMOUS EPITHELIAL: 28 /HPF
TOTAL IMMATURE NEUTOROPHIL: 0.04 K/CU MM
TOTAL NUCLEATED RBC: 0 K/CU MM
TOTAL PROTEIN: 7.6 GM/DL (ref 6.4–8.2)
TRICHOMONAS: ABNORMAL /HPF
UROBILINOGEN, URINE: NEGATIVE MG/DL (ref 0.2–1)
WBC # BLD: 8.9 K/CU MM (ref 4–10.5)
WBC UA: 3 /HPF (ref 0–5)

## 2021-02-27 PROCEDURE — 86900 BLOOD TYPING SEROLOGIC ABO: CPT

## 2021-02-27 PROCEDURE — 84702 CHORIONIC GONADOTROPIN TEST: CPT

## 2021-02-27 PROCEDURE — 80053 COMPREHEN METABOLIC PANEL: CPT

## 2021-02-27 PROCEDURE — 76802 OB US < 14 WKS ADDL FETUS: CPT

## 2021-02-27 PROCEDURE — 93975 VASCULAR STUDY: CPT

## 2021-02-27 PROCEDURE — 76817 TRANSVAGINAL US OBSTETRIC: CPT

## 2021-02-27 PROCEDURE — 76801 OB US < 14 WKS SINGLE FETUS: CPT

## 2021-02-27 PROCEDURE — 36415 COLL VENOUS BLD VENIPUNCTURE: CPT

## 2021-02-27 PROCEDURE — 85025 COMPLETE CBC W/AUTO DIFF WBC: CPT

## 2021-02-27 PROCEDURE — 99285 EMERGENCY DEPT VISIT HI MDM: CPT

## 2021-02-27 PROCEDURE — 81001 URINALYSIS AUTO W/SCOPE: CPT

## 2021-02-27 PROCEDURE — 86901 BLOOD TYPING SEROLOGIC RH(D): CPT

## 2021-02-27 ASSESSMENT — PAIN DESCRIPTION - PAIN TYPE: TYPE: ACUTE PAIN

## 2021-02-27 ASSESSMENT — PAIN DESCRIPTION - LOCATION: LOCATION: ABDOMEN

## 2021-02-27 NOTE — ED PROVIDER NOTES
eMERGENCY dEPARTMENT eNCOUnter         [address] [facility] EMERGENCY DEPARTMENT     PCP: Travon Purcell, APRN - CNP    CHIEF COMPLAINT    Chief Complaint   Patient presents with    Abdominal Pain     bleeding and reports 6 weeks pregnant       HPI    Corbin Malone is a 31 y.o. female who presents with pelvic cramping and vaginal bleeding in early pregnancy. Onset was prior to arrival at 10 AM this morning. Context is patient states last normal menstrual cycle was 2/17/2021. Had pregnancy recently confirmed at [facility]. Is scheduled to meet with OB/GYN, Dr. Juliann Powers on 3/8/2021 for first ultrasound. She believes she is about 5 to 6 weeks pregnant. G4, P2 with history of spontaneous miscarriage in first trimester. She states that she was feeling well when she began having severe generalized lower abdominal/cramping pain this morning. No preceding trauma or injury or sexual intercourse. She did have an episode of very light pink vaginal spotting with wiping only but no passage of clots. States cramping has improved to a 4/10 dull ache without worsening bleeding. No anticoagulation use. No dysuria hematuria. No other abnormal vaginal discharge or odor or concern for STDs. Denies any fever chills dizziness or lightheadedness. REVIEW OF SYSTEMS    Constitutional:  Denies fever, chills, weight loss or weakness   HENT:  Denies sore throat or ear pain   Cardiovascular:  Denies chest pain, palpitations or swelling   Respiratory:  Denies cough or shortness of breath   GI:  See HPI above  : See HPI  Musculoskeletal:  Denies back pain or groin pain or masses. No pain or swelling of extremities.   Skin:  Denies rash  Neurologic:  Denies headache, focal weakness or sensory changes   Endocrine:  Denies polyuria or polydypsia   Lymphatic:  Denies swollen glands     All other review of systems are negative  See HPI and nursing notes for additional information PAST MEDICAL & SURGICAL HISTORY    Past Medical History:   Diagnosis Date    Anxiety disorder     Asthma     Depression      Past Surgical History:   Procedure Laterality Date     SECTION       SECTION N/A 2020     SECTION performed by Kristyn Byrne MD at [facility] L&D OR       CURRENT MEDICATIONS    Current Outpatient Rx   Medication Sig Dispense Refill    labetalol (NORMODYNE) 100 MG tablet Take 1 tablet by mouth every 12 hours 60 tablet 3    ibuprofen (ADVIL;MOTRIN) 800 MG tablet Take 1 tablet by mouth every 8 hours 120 tablet 3    FLUoxetine (PROZAC) 40 MG capsule Take 1 capsule by mouth daily 30 capsule 3    albuterol sulfate  (90 Base) MCG/ACT inhaler INHALE 2 PUFFS PO EVERY 4-6 HOURS PRN      Prenatal MV-Min-Fe Fum-FA-DHA (PRENATAL 1) 30-0.975-200 MG CAPS Take 1 tablet by mouth daily 30 capsule 3       ALLERGIES    Allergies   Allergen Reactions    Gold Rash     Gold jewelry    Silver Rash     Silver jewelry       SOCIAL AND FAMILY HISTORY    Social History     Socioeconomic History    Marital status: Single     Spouse name: Not on file    Number of children: Not on file    Years of education: Not on file    Highest education level: Not on file   Occupational History    Not on file   Social Needs    Financial resource strain: Not on file    Food insecurity     Worry: Not on file     Inability: Not on file    Transportation needs     Medical: Not on file     Non-medical: Not on file   Tobacco Use    Smoking status: Current Every Day Smoker     Packs/day: 1.00     Types: Cigarettes     Last attempt to quit: 3/3/2015     Years since quittin.9    Smokeless tobacco: Never Used   Substance and Sexual Activity    Alcohol use: Yes     Comment: occ-wine 6 glasses during the pregnancy    Drug use: No    Sexual activity: Never   Lifestyle    Physical activity     Days per week: Not on file     Minutes per session: Not on file    Stress: Not on file   Relationships    Social connections     Talks on phone: Not on file     Gets together: Not on file     Attends Jainism service: Not on file     Active member of club or organization: Not on file     Attends meetings of clubs or organizations: Not on file     Relationship status: Not on file    Intimate partner violence     Fear of current or ex partner: Not on file     Emotionally abused: Not on file     Physically abused: Not on file     Forced sexual activity: Not on file   Other Topics Concern    Not on file   Social History Narrative    Not on file     Family History   Problem Relation Age of Onset    Cancer Other     Diabetes Other        PHYSICAL EXAM    VITAL SIGNS: /80   Pulse 79   Temp 98 °F (36.7 °C) (Oral)   Resp 20   Ht 5' 3\" (1.6 m)   Wt 225 lb (102.1 kg)   LMP 06/07/2020   SpO2 100%   BMI 39.86 kg/m²   General:  Well developed, well nourished, In no acute distress  Eyes:  Sclera nonicteric, Conjunctiva moist, No discharge  Head:  Normocephalic, Atramautic  Neck/Lymphatics: Supple, no JVD, no swollen nodes  Respiratory:  Clear to ausculation bilaterally, No retractions, Non labored breathing  Cardiovascular:  RRR, No murmurs/gallops/thrills  GI:   No gross discoloration. Bowel sounds present in all quadrants, No audible bruits. Soft,  Nondistended. +mild suprapubic abdominal tenderness without rebound tenderness or guarding. no localized adnexal tenderness or guarding. No palpable pulsatile masses or obvious hernias. Back:  No CVA tenderness to percussion.   Musculoskeletal:  No edema, No deformity  Peripheral Vascular: Distal pulses 2+ equal bilaterally  Integument: No rash, Normal turgor  Neurologic:  Alert & oriented, Normal speech  Psychiatric: Cooperative, pleasant affect       I have reviewed and interpreted all of the currently available lab results from this visit (if applicable):  Results for orders placed or performed during the hospital encounter of 02/27/21 CBC auto diff   Result Value Ref Range    WBC 8.9 4.0 - 10.5 K/CU MM    RBC 4.87 4.2 - 5.4 M/CU MM    Hemoglobin 13.5 12.5 - 16.0 GM/DL    Hematocrit 40.9 37 - 47 %    MCV 84.0 78 - 100 FL    MCH 27.7 27 - 31 PG    MCHC 33.0 32.0 - 36.0 %    RDW 15.6 (H) 11.7 - 14.9 %    Platelets 932 049 - 963 K/CU MM    MPV 8.7 7.5 - 11.1 FL    Differential Type AUTOMATED DIFFERENTIAL     Segs Relative 72.8 (H) 36 - 66 %    Lymphocytes % 17.6 (L) 24 - 44 %    Monocytes % 6.6 (H) 0 - 4 %    Eosinophils % 1.8 0 - 3 %    Basophils % 0.8 0 - 1 %    Segs Absolute 6.5 K/CU MM    Lymphocytes Absolute 1.6 K/CU MM    Monocytes Absolute 0.6 K/CU MM    Eosinophils Absolute 0.2 K/CU MM    Basophils Absolute 0.1 K/CU MM    Nucleated RBC % 0.0 %    Total Nucleated RBC 0.0 K/CU MM    Total Immature Neutrophil 0.04 K/CU MM    Immature Neutrophil % 0.4 0 - 0.43 %   CMP   Result Value Ref Range    Sodium 134 (L) 135 - 145 MMOL/L    Potassium 3.7 3.5 - 5.1 MMOL/L    Chloride 101 99 - 110 mMol/L    CO2 25 21 - 32 MMOL/L    BUN 8 6 - 23 MG/DL    CREATININE 0.6 0.6 - 1.1 MG/DL    Glucose 96 70 - 99 MG/DL    Calcium 8.9 8.3 - 10.6 MG/DL    Albumin 4.2 3.4 - 5.0 GM/DL    Total Protein 7.6 6.4 - 8.2 GM/DL    Total Bilirubin 0.4 0.0 - 1.0 MG/DL    ALT 13 10 - 40 U/L    AST 15 15 - 37 IU/L    Alkaline Phosphatase 66 40 - 128 IU/L    GFR Non-African American >60 >60 mL/min/1.73m2    GFR African American >60 >60 mL/min/1.73m2    Anion Gap 8 4 - 16   Urinalysis   Result Value Ref Range    Color, UA YELLOW YELLOW    Clarity, UA SLIGHTLY CLOUDY (A) CLEAR    Glucose, Urine NEGATIVE NEGATIVE MG/DL    Bilirubin Urine NEGATIVE NEGATIVE MG/DL    Ketones, Urine NEGATIVE NEGATIVE MG/DL    Specific Gravity, UA 1.025 1.001 - 1.035    Blood, Urine MODERATE (A) NEGATIVE    pH, Urine 6.0 5.0 - 8.0    Protein, UA 30 (A) NEGATIVE MG/DL    Urobilinogen, Urine NEGATIVE 0.2 - 1.0 MG/DL    Nitrite Urine, Quantitative NEGATIVE NEGATIVE    Leukocyte Esterase, Urine TRACE (A) NEGATIVE    RBC, UA 1 0 - 6 /HPF    WBC, UA 3 0 - 5 /HPF    Bacteria, UA NEGATIVE NEGATIVE /HPF    Squam Epithel, UA 28 /HPF    Mucus, UA FEW (A) NEGATIVE HPF    Trichomonas, UA NONE SEEN NONE SEEN /HPF   HCG Serum, Quantitative   Result Value Ref Range    hCG Quant 9265.0 UIU/ML   ABO/RH   Result Value Ref Range    ABO/Rh B POSITIVE         RADIOLOGY/PROCEDURES        US OB TRANSVAGINAL (Final result)  Result time 02/27/21 15:22:49  Final result by Nemesio Galo MD (02/27/21 15:22:49)                Impression:    1. Twin gestation with 2 intrauterine gestational sacs identified.  Yolk sacs   are present within both gestational sacs.  No fetal poles are identified at   this time.  Too early for accurate dating by ultrasound.  Estimated   gestational age by last menstrual period is 5 weeks and 6 days. 2. Unremarkable appearance of the bilateral ovaries. Narrative:    EXAMINATION:   FIRST TRIMESTER OBSTETRIC ULTRASOUND; DOPPLER EVALUATION OF THE PELVIS;   TRIMESTER OBSTETRIC ULTRASOUND     2/27/2021     TECHNIQUE:   Transvaginal first trimester obstetric pelvic ultrasound was performed with   color Doppler flow evaluation.; DOPPLER ULTRASOUND OF THE PELVIS     COMPARISON:   Obstetric ultrasound December 18, 2014     HISTORY:   ORDERING SYSTEM PROVIDED HISTORY: pain and pregnancy   TECHNOLOGIST PROVIDED HISTORY:   Thank you. Reason for exam:->pain and pregnancy   Reason for Exam: Preg w/bleeding   Acuity: Acute   Type of Exam: Initial   Additional signs and symptoms: Beta HCG 9265; ORDERING SYSTEM PROVIDED   HISTORY: pain   TECHNOLOGIST PROVIDED HISTORY:   Thank you. Reason for exam:->pain; ORDERING SYSTEM PROVIDED HISTORY: bleeding   TECHNOLOGIST PROVIDED HISTORY:   Thank you.    Reason for exam:->bleeding   Reason for Exam: Pregnant with bleeding   Acuity: Acute   Type of Exam: Initial   Additional signs and symptoms: Beta HCG 9265     FINDINGS:   Uterus: 9.4 x 6.6 x 5.0 cm     Gestational Sac(s):  2 intrauterine gestational sacs are identified.  No   subchorionic hemorrhage evident. Yolk Sac:  Yolk sacs are present within both gestational sacs. Fetal Pole:  No fetal pole identified at this time. [place] Rump Length:  NA     Fetal Heart Rate:  NA     Right ovary: 3.1 x 2.6 x 1.5 cm.  Vascular flow present within the right   ovary. Left ovary: 3.4 x 2.6 x 2.3 cm.  Vascular flow present within the left ovary. Free fluid: No free fluid identified. Measurements:     Estimated gestational age by current ultrasound: Too early for accurate   dating. Estimated gestational by LMP/prior ultrasound: 5 weeks 6 days                     US DUP ABD PEL RETRO SCROT COMPLETE (Final result)  Result time 02/27/21 15:22:49  Final result by Damaso Kilgore MD (02/27/21 15:22:49)                Impression:    1. Twin gestation with 2 intrauterine gestational sacs identified.  Yolk sacs   are present within both gestational sacs.  No fetal poles are identified at   this time.  Too early for accurate dating by ultrasound.  Estimated   gestational age by last menstrual period is 5 weeks and 6 days. 2. Unremarkable appearance of the bilateral ovaries. Narrative:    EXAMINATION:   FIRST TRIMESTER OBSTETRIC ULTRASOUND; DOPPLER EVALUATION OF THE PELVIS;   TRIMESTER OBSTETRIC ULTRASOUND     2/27/2021     TECHNIQUE:   Transvaginal first trimester obstetric pelvic ultrasound was performed with   color Doppler flow evaluation.; DOPPLER ULTRASOUND OF THE PELVIS     COMPARISON:   Obstetric ultrasound December 18, 2014     HISTORY:   ORDERING SYSTEM PROVIDED HISTORY: pain and pregnancy   TECHNOLOGIST PROVIDED HISTORY:   Thank you. Reason for exam:->pain and pregnancy   Reason for Exam: Preg w/bleeding   Acuity: Acute   Type of Exam: Initial   Additional signs and symptoms: Beta HCG 9265; ORDERING SYSTEM PROVIDED   HISTORY: pain   TECHNOLOGIST PROVIDED HISTORY:   Thank you.    Reason for exam:->pain; TECHNOLOGIST PROVIDED HISTORY:   Thank you. Reason for exam:->pain and pregnancy   Reason for Exam: Preg w/bleeding   Acuity: Acute   Type of Exam: Initial   Additional signs and symptoms: Beta HCG 9265; ORDERING SYSTEM PROVIDED   HISTORY: pain   TECHNOLOGIST PROVIDED HISTORY:   Thank you. Reason for exam:->pain; ORDERING SYSTEM PROVIDED HISTORY: bleeding   TECHNOLOGIST PROVIDED HISTORY:   Thank you. Reason for exam:->bleeding   Reason for Exam: Pregnant with bleeding   Acuity: Acute   Type of Exam: Initial   Additional signs and symptoms: Beta HCG 9265     FINDINGS:   Uterus: 9.4 x 6.6 x 5.0 cm     Gestational Sac(s):  2 intrauterine gestational sacs are identified.  No   subchorionic hemorrhage evident. Yolk Sac:  Yolk sacs are present within both gestational sacs. Fetal Pole:  No fetal pole identified at this time. [place] Rump Length:  NA     Fetal Heart Rate:  NA     Right ovary: 3.1 x 2.6 x 1.5 cm.  Vascular flow present within the right   ovary. Left ovary: 3.4 x 2.6 x 2.3 cm.  Vascular flow present within the left ovary. Free fluid: No free fluid identified. Measurements:     Estimated gestational age by current ultrasound: Too early for accurate   dating. Estimated gestational by LMP/prior ultrasound: 5 weeks 6 days                         ED COURSE & MEDICAL DECISION MAKING      Vital signs and nursing notes reviewed during ED course. I have independently evaluated this patient . Supervising MD - Dr Lucero Kamara - present in the Emergency Department, available for consultation, throughout entirety of  patient care. All pertinent Lab data and radiographic results reviewed with patient at bedside. The patient and / or the family were informed of the results of any tests, a time was given to answer questions, a plan was proposed and they agreed with plan.          Differential diagnosis: Abdominal Aortic Aneurysm, Ischemic Bowel, Bowel Obstruction, Acute Cholecystitis, Acute Appendicitis, other    Clinical  IMPRESSION    1. Vaginal bleeding in pregnancy    2. Twin gestation in first trimester, unspecified multiple gestation type          Patient presents with vaginal bleeding and pelvic cramping in early pregnancy. On exam, well-appearing nontoxic 69-year-old female, no acute distress. Vitals are stable and she appears hemodynamically intact. Abdominal exam is nonsurgical.  No localized adnexal tenderness but there is mild tenderness in the suprapubic region without other peritoneal signs rebound or guarding. No CVA tenderness. Labs are reassuring. CBC without white count, normal hemoglobin. CMP without significant derangement. UA shows moderate gross blood but negative for bacteria, nitrites. Sample is contaminated 28 squamous epithelial cells and I suspect blood secondary to vaginal contamination. She does not have any flank pain or UTI symptoms concerning for pyelonephritis or obstructive uropathy. hCG quant level is elevated 9265 with Rh status be positive so no RhoGam is indicated. Transvaginal OB ultrasound reveals twin gestation with 2 intrauterine gestational sacs with yolk sacs present within both gestational sacs. No fetal pole at this time and too early to accurate date by ultrasound the estimated gestational age by menstrual period is 5 weeks and 6 days. Imaging results were discussed with patient. We discussed outpatient follow-up with OB/GYN as well as pelvic rest and bleeding precautions. Did discuss given her early pregnancy status, cannot completely rule out threatened miscarriage and need for close follow-up. Should avoid placement of foreign bodies into the vaginal canal including tampons and should avoid sexual intercourse. Low clinical suspicion for other evidence of acute surgical abdomen that would warrant additional imaging. Patient does have prenatal vitamins at home. Medial ice Tylenol as needed for pain.   Denying any nausea or vomiting or need for antiemetics. No evidence at this time for an ectopic pregnancy. While in the ED, patient appeared hemodynamically intact and had no worsened vaginal bleeding. Patient is discharged at this time in stable condition with outpatient follow-up as discussed above. Return warnings back to the ED again discussed. In light of current events, I did utilize appropriate PPE (including N95 face mask, safety glasses, gloves as recommended by the health facility/national standard best practice, during my bedside interactions with the patient. Patient was masked throughout ED course. Full droplet precaution as well as full PPE was followed throughout patient's ED course and evaluation. Comment: Please note this report has been produced using speech recognition software and may contain errors related to that system including errors in grammar, punctuation, and spelling, as well as words and phrases that may be inappropriate. If there are any questions or concerns please feel free to contact the dictating provider for clarification.           Mare Gonsalez PA-C  02/27/21 [id number]

## 2021-02-27 NOTE — ED NOTES
Verbal and written discharge instructions provided with education. Patient verbalized understanding and denies further questions or concerns.      Margarita Orozco RN  02/27/21 9233

## 2021-05-04 ENCOUNTER — HOSPITAL ENCOUNTER (EMERGENCY)
Age: 26
Discharge: HOME | End: 2021-05-04
Attending: EMERGENCY MEDICINE
Payer: COMMERCIAL

## 2021-05-04 ENCOUNTER — APPOINTMENT (OUTPATIENT)
Dept: GENERAL RADIOLOGY | Age: 26
End: 2021-05-04
Payer: COMMERCIAL

## 2021-05-04 ENCOUNTER — APPOINTMENT (OUTPATIENT)
Dept: ULTRASOUND IMAGING | Age: 26
End: 2021-05-04
Payer: COMMERCIAL

## 2021-05-04 VITALS
HEART RATE: 102 BPM | WEIGHT: 225 LBS | OXYGEN SATURATION: 98 % | TEMPERATURE: 98.3 F | SYSTOLIC BLOOD PRESSURE: 119 MMHG | HEIGHT: 63 IN | BODY MASS INDEX: 39.87 KG/M2 | DIASTOLIC BLOOD PRESSURE: 55 MMHG | RESPIRATION RATE: 30 BRPM

## 2021-05-04 DIAGNOSIS — R05.9 COUGH: Primary | ICD-10-CM

## 2021-05-04 DIAGNOSIS — R53.83 OTHER FATIGUE: ICD-10-CM

## 2021-05-04 DIAGNOSIS — Z34.90 PREGNANCY, UNSPECIFIED GESTATIONAL AGE: ICD-10-CM

## 2021-05-04 DIAGNOSIS — J06.9 UPPER RESPIRATORY TRACT INFECTION, UNSPECIFIED TYPE: ICD-10-CM

## 2021-05-04 DIAGNOSIS — R53.83 FATIGUE, UNSPECIFIED TYPE: ICD-10-CM

## 2021-05-04 LAB
ALBUMIN SERPL-MCNC: 3.6 GM/DL (ref 3.4–5)
ALP BLD-CCNC: 71 IU/L (ref 40–129)
ALT SERPL-CCNC: 6 U/L (ref 10–40)
AMORPHOUS: ABNORMAL /HPF
ANION GAP SERPL CALCULATED.3IONS-SCNC: 8 MMOL/L (ref 4–16)
AST SERPL-CCNC: 9 IU/L (ref 15–37)
BACTERIA: NEGATIVE /HPF
BASOPHILS ABSOLUTE: 0 K/CU MM
BASOPHILS RELATIVE PERCENT: 0.4 % (ref 0–1)
BILIRUB SERPL-MCNC: 0.1 MG/DL (ref 0–1)
BILIRUBIN URINE: NEGATIVE MG/DL
BLOOD, URINE: ABNORMAL
BUN BLDV-MCNC: 5 MG/DL (ref 6–23)
CALCIUM SERPL-MCNC: 9.5 MG/DL (ref 8.3–10.6)
CHLORIDE BLD-SCNC: 104 MMOL/L (ref 99–110)
CLARITY: ABNORMAL
CO2: 23 MMOL/L (ref 21–32)
COLOR: YELLOW
CREAT SERPL-MCNC: 0.5 MG/DL (ref 0.6–1.1)
DIFFERENTIAL TYPE: ABNORMAL
EOSINOPHILS ABSOLUTE: 0.3 K/CU MM
EOSINOPHILS RELATIVE PERCENT: 2.6 % (ref 0–3)
GFR AFRICAN AMERICAN: >60 ML/MIN/1.73M2
GFR NON-AFRICAN AMERICAN: >60 ML/MIN/1.73M2
GLUCOSE BLD-MCNC: 87 MG/DL (ref 70–99)
GLUCOSE, URINE: NEGATIVE MG/DL
GONADOTROPIN, CHORIONIC (HCG) QUANT: NORMAL UIU/ML
HCT VFR BLD CALC: 34.9 % (ref 37–47)
HEMOGLOBIN: 12.2 GM/DL (ref 12.5–16)
IMMATURE NEUTROPHIL %: 0.8 % (ref 0–0.43)
KETONES, URINE: NEGATIVE MG/DL
LEUKOCYTE ESTERASE, URINE: NEGATIVE
LYMPHOCYTES ABSOLUTE: 2.2 K/CU MM
LYMPHOCYTES RELATIVE PERCENT: 19.8 % (ref 24–44)
MAGNESIUM: 1.8 MG/DL (ref 1.8–2.4)
MCH RBC QN AUTO: 29.2 PG (ref 27–31)
MCHC RBC AUTO-ENTMCNC: 35 % (ref 32–36)
MCV RBC AUTO: 83.5 FL (ref 78–100)
MONOCYTES ABSOLUTE: 0.7 K/CU MM
MONOCYTES RELATIVE PERCENT: 6 % (ref 0–4)
MUCUS: ABNORMAL HPF
NITRITE URINE, QUANTITATIVE: NEGATIVE
NUCLEATED RBC %: 0 %
PDW BLD-RTO: 15.6 % (ref 11.7–14.9)
PH, URINE: 7 (ref 5–8)
PLATELET # BLD: 378 K/CU MM (ref 140–440)
PMV BLD AUTO: 8.8 FL (ref 7.5–11.1)
POTASSIUM SERPL-SCNC: 4 MMOL/L (ref 3.5–5.1)
PROTEIN UA: NEGATIVE MG/DL
RAPID INFLUENZA  B AGN: NEGATIVE
RAPID INFLUENZA A AGN: NEGATIVE
RBC # BLD: 4.18 M/CU MM (ref 4.2–5.4)
RBC URINE: 5 /HPF (ref 0–6)
SARS-COV-2, NAAT: NOT DETECTED
SEGMENTED NEUTROPHILS ABSOLUTE COUNT: 7.8 K/CU MM
SEGMENTED NEUTROPHILS RELATIVE PERCENT: 70.4 % (ref 36–66)
SODIUM BLD-SCNC: 135 MMOL/L (ref 135–145)
SOURCE: NORMAL
SPECIFIC GRAVITY UA: 1.01 (ref 1–1.03)
SQUAMOUS EPITHELIAL: 14 /HPF
TOTAL IMMATURE NEUTOROPHIL: 0.09 K/CU MM
TOTAL NUCLEATED RBC: 0 K/CU MM
TOTAL PROTEIN: 6.7 GM/DL (ref 6.4–8.2)
TRICHOMONAS: ABNORMAL /HPF
TSH HIGH SENSITIVITY: 0.55 UIU/ML (ref 0.27–4.2)
UROBILINOGEN, URINE: 2 MG/DL (ref 0.2–1)
WBC # BLD: 11.1 K/CU MM (ref 4–10.5)
WBC UA: ABNORMAL /HPF (ref 0–5)

## 2021-05-04 PROCEDURE — 84702 CHORIONIC GONADOTROPIN TEST: CPT

## 2021-05-04 PROCEDURE — 85025 COMPLETE CBC W/AUTO DIFF WBC: CPT

## 2021-05-04 PROCEDURE — 6370000000 HC RX 637 (ALT 250 FOR IP): Performed by: EMERGENCY MEDICINE

## 2021-05-04 PROCEDURE — 36415 COLL VENOUS BLD VENIPUNCTURE: CPT

## 2021-05-04 PROCEDURE — 76810 OB US >/= 14 WKS ADDL FETUS: CPT

## 2021-05-04 PROCEDURE — 80053 COMPREHEN METABOLIC PANEL: CPT

## 2021-05-04 PROCEDURE — 76801 OB US < 14 WKS SINGLE FETUS: CPT

## 2021-05-04 PROCEDURE — 93005 ELECTROCARDIOGRAM TRACING: CPT | Performed by: EMERGENCY MEDICINE

## 2021-05-04 PROCEDURE — 2580000003 HC RX 258: Performed by: EMERGENCY MEDICINE

## 2021-05-04 PROCEDURE — 84443 ASSAY THYROID STIM HORMONE: CPT

## 2021-05-04 PROCEDURE — 87804 INFLUENZA ASSAY W/OPTIC: CPT

## 2021-05-04 PROCEDURE — 71045 X-RAY EXAM CHEST 1 VIEW: CPT

## 2021-05-04 PROCEDURE — 99285 EMERGENCY DEPT VISIT HI MDM: CPT

## 2021-05-04 PROCEDURE — 83735 ASSAY OF MAGNESIUM: CPT

## 2021-05-04 PROCEDURE — 94640 AIRWAY INHALATION TREATMENT: CPT

## 2021-05-04 PROCEDURE — 81001 URINALYSIS AUTO W/SCOPE: CPT

## 2021-05-04 PROCEDURE — 87635 SARS-COV-2 COVID-19 AMP PRB: CPT

## 2021-05-04 RX ORDER — ALBUTEROL SULFATE 90 UG/1
2 AEROSOL, METERED RESPIRATORY (INHALATION) EVERY 4 HOURS PRN
Qty: 1 INHALER | Refills: 1 | Status: SHIPPED | OUTPATIENT
Start: 2021-05-04 | End: 2021-06-03

## 2021-05-04 RX ORDER — 0.9 % SODIUM CHLORIDE 0.9 %
1000 INTRAVENOUS SOLUTION INTRAVENOUS ONCE
Status: COMPLETED | OUTPATIENT
Start: 2021-05-04 | End: 2021-05-04

## 2021-05-04 RX ORDER — GUAIFENESIN 100 MG/5ML
200 SOLUTION ORAL EVERY 4 HOURS PRN
Qty: 840 ML | Refills: 0 | Status: ON HOLD | OUTPATIENT
Start: 2021-05-04 | End: 2021-08-01 | Stop reason: ALTCHOICE

## 2021-05-04 RX ORDER — ALBUTEROL SULFATE 90 UG/1
2 AEROSOL, METERED RESPIRATORY (INHALATION) ONCE
Status: COMPLETED | OUTPATIENT
Start: 2021-05-04 | End: 2021-05-04

## 2021-05-04 RX ORDER — GUAIFENESIN 100 MG/5ML
200 SOLUTION ORAL ONCE
Status: COMPLETED | OUTPATIENT
Start: 2021-05-04 | End: 2021-05-04

## 2021-05-04 RX ADMIN — Medication 2 PUFF: at 15:46

## 2021-05-04 RX ADMIN — ALBUTEROL SULFATE 2 PUFF: 90 AEROSOL, METERED RESPIRATORY (INHALATION) at 15:41

## 2021-05-04 RX ADMIN — GUAIFENESIN 200 MG: 200 SOLUTION ORAL at 15:55

## 2021-05-04 RX ADMIN — SODIUM CHLORIDE 1000 ML: 9 INJECTION, SOLUTION INTRAVENOUS at 15:56

## 2021-05-04 ASSESSMENT — ENCOUNTER SYMPTOMS
EYES NEGATIVE: 1
ALLERGIC/IMMUNOLOGIC NEGATIVE: 1
COUGH: 1
RHINORRHEA: 1
GASTROINTESTINAL NEGATIVE: 1

## 2021-05-04 NOTE — ED PROVIDER NOTES
[facility]      TRIAGE CHIEF COMPLAINT:   Shortness of Breath (currently 15 weeks pregnant), Cough, and Fatigue      [place]:  Hector Sharp is a 31 y.o. female that presents complaint of cough shortness of breath fatigue malaise. Patient had Covid back in February she is not been vaccinated. States she is currently 15 weeks pregnant  1 miscarriage around 15 weeks states for the last couple days she has had cough productive with green sputum general fatigue and malaise congestion rhinorrhea sneezing. Mother sick with similar symptoms. She saw her OB/GYN was put on Robitussin, Z-Donald which she is taking but states is not helping. She has a history of asthma she is a smoker did advise quitting. Denies history of heart problems or history of DVT. AAA denies any swelling or abdominal pain nausea vomiting diarrhea. No travel. No other questions or concerns. States she is feeling little better today shortness of breath was worse last night currently she is satting 99% in no distress    REVIEW OF SYSTEMS:  At least 10 systems reviewed and otherwise acutely negative except as in the [address] Review of Systems   Constitutional: Positive for chills and fatigue. HENT: Positive for congestion, rhinorrhea and sneezing. Eyes: Negative. Respiratory: Positive for cough. Cardiovascular: Negative. Gastrointestinal: Negative. Endocrine: Negative. Genitourinary: Negative. Musculoskeletal: Negative. Skin: Negative. Allergic/Immunologic: Negative. Neurological: Positive for weakness. Hematological: Negative. Psychiatric/Behavioral: Negative. All other systems reviewed and are negative.       Past Medical History:   Diagnosis Date    Anxiety disorder     Asthma     Depression      Past Surgical History:   Procedure Laterality Date     SECTION       SECTION N/A 2020     SECTION performed by Patti Daily [facility] at [facility] Inhaler 1    guaiFENesin (ROBITUSSIN) 200 MG/10ML SOLN oral solution Take 10 mLs by mouth every 4 hours as needed for Cough 840 mL 0    labetalol (NORMODYNE) 100 MG tablet Take 1 tablet by mouth every 12 hours 60 tablet 3    ibuprofen (ADVIL;MOTRIN) 800 MG tablet Take 1 tablet by mouth every 8 hours 120 tablet 3    FLUoxetine (PROZAC) 40 MG capsule Take 1 capsule by mouth daily 30 capsule 3    albuterol sulfate  (90 Base) MCG/ACT inhaler INHALE 2 PUFFS PO EVERY 4-6 HOURS PRN      Prenatal MV-Min-Fe Fum-FA-DHA (PRENATAL 1) 30-0.975-200 MG CAPS Take 1 tablet by mouth daily 30 capsule 3      Allergies   Allergen Reactions    Gold Rash     Gold jewelry    Silver Rash     Silver jewelry     No current facility-administered medications for this encounter. Current Outpatient Medications   Medication Sig Dispense Refill    albuterol sulfate HFA (PROVENTIL HFA) 108 (90 Base) MCG/ACT inhaler Inhale 2 puffs into the lungs every 4 hours as needed for Wheezing or Shortness of Breath With spacer (and mask if indicated). Thanks.  1 Inhaler 1    guaiFENesin (ROBITUSSIN) 200 MG/10ML SOLN oral solution Take 10 mLs by mouth every 4 hours as needed for Cough 840 mL 0    labetalol (NORMODYNE) 100 MG tablet Take 1 tablet by mouth every 12 hours 60 tablet 3    ibuprofen (ADVIL;MOTRIN) 800 MG tablet Take 1 tablet by mouth every 8 hours 120 tablet 3    FLUoxetine (PROZAC) 40 MG capsule Take 1 capsule by mouth daily 30 capsule 3    albuterol sulfate  (90 Base) MCG/ACT inhaler INHALE 2 PUFFS PO EVERY 4-6 HOURS PRN      Prenatal MV-Min-Fe Fum-FA-DHA (PRENATAL 1) 30-0.975-200 MG CAPS Take 1 tablet by mouth daily 30 capsule 3       Nursing Notes Reviewed    VITAL SIGNS:  ED Triage Vitals [05/04/21 1352]   Enc Vitals Group      /73      Pulse 105      Resp 16      Temp 98.3 °F (36.8 °C)      Temp Source Oral      SpO2 100 %      Weight 225 lb (102.1 kg)      Height 5' 3\" (1.6 m)      Head Circumference Peak Flow       Pain Score       Pain Loc       Pain Edu? Excl. in [address]? PHYSICAL EXAM:  Physical Exam  Vitals signs and nursing note reviewed. Constitutional:       General: She is not in acute distress. Appearance: Normal appearance. She is well-developed and well-groomed. She is not ill-appearing, toxic-appearing or diaphoretic. HENT:      Head: Normocephalic and atraumatic. Right Ear: External ear normal.      Left Ear: External ear normal.      Nose: Congestion present. No rhinorrhea. Eyes:      General: No scleral icterus. Right eye: No discharge. Left eye: No discharge. Extraocular Movements: Extraocular movements intact. Conjunctiva/sclera: Conjunctivae normal.      Pupils: Pupils are equal, round, and reactive to light. Neck:      Musculoskeletal: Full passive range of motion without pain and normal range of motion. Normal range of motion. No edema, erythema, neck rigidity, crepitus, injury, pain with movement or torticollis. Vascular: No JVD. Trachea: Phonation normal.   Cardiovascular:      Rate and Rhythm: Normal rate and regular rhythm. Pulses: Normal pulses. Heart sounds: Normal heart sounds. No murmur. No friction rub. No gallop. Pulmonary:      Effort: Pulmonary effort is normal. No respiratory distress. Breath sounds: Normal breath sounds. No stridor. No wheezing, rhonchi or rales. Abdominal:      General: Bowel sounds are normal. There is no distension. Palpations: Abdomen is soft. There is no mass. Tenderness: There is no abdominal tenderness. There is no guarding or rebound. Negative signs include Melendez's sign, Rovsing's sign and McBurney's sign. Hernia: No hernia is present. Musculoskeletal: Normal range of motion. General: No swelling, tenderness, deformity or signs of injury. Right lower leg: No edema. Left lower leg: No edema. Skin:     General: Skin is warm. Coloration: Skin is not jaundiced or pale. Findings: No bruising, erythema, lesion or rash. Neurological:      General: No focal deficit present. Mental Status: She is alert and oriented to person, place, and time. GCS: GCS eye subscore is 4. GCS verbal subscore is 5. GCS motor subscore is 6. Cranial Nerves: Cranial nerves are intact. No cranial nerve deficit, dysarthria or facial asymmetry. Sensory: Sensation is intact. No sensory deficit. Motor: Motor function is intact. No weakness, tremor, atrophy, abnormal muscle tone or seizure activity. Coordination: Coordination is intact. Coordination normal.   Psychiatric:         Mood and Affect: Mood normal.         Behavior: Behavior normal. Behavior is cooperative. Thought Content:  Thought content normal.         Judgment: Judgment normal.           I have reviewed andinterpreted all of the currently available lab results from this visit (if applicable):    Results for orders placed or performed during the hospital encounter of 05/04/21   Rapid Flu Swab    Specimen: Nasopharyngeal   Result Value Ref Range    Rapid Influenza A Ag NEGATIVE NEGATIVE    Rapid Influenza B Ag NEGATIVE NEGATIVE   COVID-19, Rapid    Specimen: Nasopharyngeal   Result Value Ref Range    Source THROAT     SARS-CoV-2, NAAT NOT DETECTED NOT DETECTED   CBC Auto Differential   Result Value Ref Range    WBC 11.1 (H) 4.0 - 10.5 K/CU MM    RBC 4.18 (L) 4.2 - 5.4 M/CU MM    Hemoglobin 12.2 (L) 12.5 - 16.0 GM/DL    Hematocrit 34.9 (L) 37 - 47 %    MCV 83.5 78 - 100 FL    MCH 29.2 27 - 31 PG    MCHC 35.0 32.0 - 36.0 %    RDW 15.6 (H) 11.7 - 14.9 %    Platelets 224 223 - 789 K/CU MM    MPV 8.8 7.5 - 11.1 FL    Differential Type AUTOMATED DIFFERENTIAL     Segs Relative 70.4 (H) 36 - 66 %    Lymphocytes % 19.8 (L) 24 - 44 %    Monocytes % 6.0 (H) 0 - 4 %    Eosinophils % 2.6 0 - 3 %    Basophils % 0.4 0 - 1 %    Segs Absolute 7.8 K/CU MM    Lymphocytes Absolute 2.2 K/CU MM    Monocytes Absolute 0.7 K/CU MM    Eosinophils Absolute 0.3 K/CU MM    Basophils Absolute 0.0 K/CU MM    Nucleated RBC % 0.0 %    Total Nucleated RBC 0.0 K/CU MM    Total Immature Neutrophil 0.09 K/CU MM    Immature Neutrophil % 0.8 (H) 0 - 0.43 %   CMP   Result Value Ref Range    Sodium 135 135 - 145 MMOL/L    Potassium 4.0 3.5 - 5.1 MMOL/L    Chloride 104 99 - 110 mMol/L    CO2 23 21 - 32 MMOL/L    BUN 5 (L) 6 - 23 MG/DL    CREATININE 0.5 (L) 0.6 - 1.1 MG/DL    Glucose 87 70 - 99 MG/DL    Calcium 9.5 8.3 - 10.6 MG/DL    Albumin 3.6 3.4 - 5.0 GM/DL    Total Protein 6.7 6.4 - 8.2 GM/DL    Total Bilirubin 0.1 0.0 - 1.0 MG/DL    ALT 6 (L) 10 - 40 U/L    AST 9 (L) 15 - 37 IU/L    Alkaline Phosphatase 71 40 - 129 IU/L    GFR Non-African American >60 >60 mL/min/1.73m2    GFR African American >60 >60 mL/min/1.73m2    Anion Gap 8 4 - 16   HCG Serum, Quantitative   Result Value Ref Range    hCG Quant 53369.0 UIU/ML   Urinalysis   Result Value Ref Range    Color, UA YELLOW YELLOW    Clarity, UA CLOUDY (A) CLEAR    Glucose, Urine NEGATIVE NEGATIVE MG/DL    Bilirubin Urine NEGATIVE NEGATIVE MG/DL    Ketones, Urine NEGATIVE NEGATIVE MG/DL    Specific Gravity, UA 1.015 1.001 - 1.035    Blood, Urine SMALL (A) NEGATIVE    pH, Urine 7.0 5.0 - 8.0    Protein, UA NEGATIVE NEGATIVE MG/DL    Urobilinogen, Urine 2.0 (H) 0.2 - 1.0 MG/DL    Nitrite Urine, Quantitative NEGATIVE NEGATIVE    Leukocyte Esterase, Urine NEGATIVE NEGATIVE    RBC, UA 5 0 - 6 /HPF    WBC, UA NONE SEEN 0 - 5 /HPF    Bacteria, UA NEGATIVE NEGATIVE /HPF    Squam Epithel, UA 14 /HPF    Mucus, UA RARE (A) NEGATIVE HPF    Trichomonas, UA NONE SEEN NONE SEEN /HPF    Amorphous, UA OCCASIONAL /HPF   TSH without Reflex   Result Value Ref Range    TSH, High Sensitivity 0.550 0.270 - 4.20 uIu/ml   Magnesium   Result Value Ref Range    Magnesium 1.8 1.8 - 2.4 mg/dl   EKG 12 Lead   Result Value Ref Range    Ventricular Rate 86 BPM    Atrial Rate 86 BPM    P-R Interval 140 ms    QRS Duration 88 ms    Q-T Interval 366 ms    QTc Calculation (Bazett) 437 ms    P Axis 34 degrees    R Axis 57 degrees    T Axis 22 degrees    Diagnosis       Normal sinus rhythm  Normal ECG  No previous ECGs available          Radiographs (if obtained):  [] The following radiograph was interpreted by myself in the absence of a radiologist:  [x] Radiologist's Report Reviewed:    CXR, US OB/GYN    EKG (if obtained): (All EKG's are interpreted by myself in the absence of a cardiologist)    12 lead EKG per my interpretation:  Normal Sinus Rhythm 86  Axis is   Normal  QTc is  437  There is no specific T wave changes appreciated. There is no specific ST wave changes appreciated. Prior EKG to compare with was not available       MDM:    Patient here with URI symptoms for the past couple days. Again mother sick with similar symptoms. Patient had COVID-19 back in February she has not been vaccinated. She is 15 weeks pregnant  at 15 weeks again. Has seen her OB/GYN, was told she had bronchitis is on a Z-Donald, cough medicine, inhalers for asthma but states she is not getting better. She appears remarkably well she states she is so fatigued today she cannot get out of bed she is sitting in the bed in no distress moving all extremities. She has no headache or chest pain or abdominal pain. Her vital signs are stable she has no respiratory distress no stridor no tachypnea no accessory muscle use. Lungs are clear. I will give her cough medicine inhaler she is a smoker did advise cessation she has no history of DVT PE or AAA. We will check her for flu, Covid I did wear 95 mask we will check basic lab work, will check fetal heart tones. She has no chest pain unlikely ACS I do not think she has DVT PE or AAA or sepsis or pneumothorax likely viral URI. Will treat symptomatically. Patient recheck doing well so far work-up is negative pain labs imaging.   Covid is negative she does have twin gestations which are stable. Patient feeling better reexamination she ate without difficulty discharged home given return precautions and follow-up information I do not know she has ACS DVT PE AAA otherwise stable discharge. Likely viral URI. CLINICAL IMPRESSION:  Final diagnoses:   Cough   Other fatigue   Fatigue, unspecified type   Pregnancy, unspecified gestational age   Upper respiratory tract infection, unspecified type       (Please note that portions of this note may have been completed with a voice recognition program. Efforts were made to edit the dictations but occasionally words aremis-transcribed.)    DISPOSITION REFERRAL (if applicable):  Suzanne Hope, APRN - [facility]  [id number] [facility] [address] [id number]    In 1 day      [facility]  [address]  [phone]    If symptoms worsen      DISPOSITION MEDICATIONS (if applicable):  New Prescriptions    ALBUTEROL SULFATE HFA (PROVENTIL HFA) 108 (90 BASE) MCG/ACT INHALER    Inhale 2 puffs into the lungs every 4 hours as needed for Wheezing or Shortness of Breath With spacer (and mask if indicated). Thanks.     GUAIFENESIN (ROBITUSSIN) 200 MG/10ML SOLN ORAL SOLUTION    Take 10 mLs by mouth every 4 hours as needed for Cough          DO Hudson Jorge DO  05/04/21 2001

## 2021-05-04 NOTE — ED NOTES
Patient returned from radiology,requesting to leave d/t babysitting problems     Fam Juarez RN  05/04/21 4205

## 2021-05-05 ENCOUNTER — ANESTHESIA (OUTPATIENT)
Dept: OPERATING ROOM | Age: 26
End: 2021-05-05
Payer: COMMERCIAL

## 2021-05-05 ENCOUNTER — ANESTHESIA EVENT (OUTPATIENT)
Dept: OPERATING ROOM | Age: 26
End: 2021-05-05
Payer: COMMERCIAL

## 2021-05-05 ENCOUNTER — HOSPITAL ENCOUNTER (OUTPATIENT)
Age: 26
Setting detail: OBSERVATION
Discharge: HOME | End: 2021-05-06
Attending: HOSPITALIST | Admitting: UROLOGY
Payer: COMMERCIAL

## 2021-05-05 ENCOUNTER — APPOINTMENT (OUTPATIENT)
Dept: ULTRASOUND IMAGING | Age: 26
End: 2021-05-05
Payer: COMMERCIAL

## 2021-05-05 ENCOUNTER — APPOINTMENT (OUTPATIENT)
Dept: GENERAL RADIOLOGY | Age: 26
End: 2021-05-05
Payer: COMMERCIAL

## 2021-05-05 VITALS
OXYGEN SATURATION: 97 % | DIASTOLIC BLOOD PRESSURE: 55 MMHG | SYSTOLIC BLOOD PRESSURE: 93 MMHG | RESPIRATION RATE: 16 BRPM

## 2021-05-05 DIAGNOSIS — O26.892: Primary | ICD-10-CM

## 2021-05-05 DIAGNOSIS — N13.5: Primary | ICD-10-CM

## 2021-05-05 DIAGNOSIS — N13.2 URETERAL STONE WITH HYDRONEPHROSIS: ICD-10-CM

## 2021-05-05 PROBLEM — N20.0 KIDNEY STONE: Status: ACTIVE | Noted: 2021-05-05

## 2021-05-05 LAB
ALBUMIN SERPL-MCNC: 3.4 GM/DL (ref 3.4–5)
ALP BLD-CCNC: 69 IU/L (ref 40–129)
ALT SERPL-CCNC: 6 U/L (ref 10–40)
ANION GAP SERPL CALCULATED.3IONS-SCNC: 8 MMOL/L (ref 4–16)
AST SERPL-CCNC: 10 IU/L (ref 15–37)
BACTERIA: ABNORMAL /HPF
BACTERIA: NEGATIVE /HPF
BASOPHILS ABSOLUTE: 0 K/CU MM
BASOPHILS RELATIVE PERCENT: 0.3 % (ref 0–1)
BILIRUB SERPL-MCNC: 0.1 MG/DL (ref 0–1)
BILIRUBIN URINE: NEGATIVE MG/DL
BILIRUBIN URINE: NEGATIVE MG/DL
BLOOD, URINE: ABNORMAL
BLOOD, URINE: ABNORMAL
BUN BLDV-MCNC: 5 MG/DL (ref 6–23)
CALCIUM SERPL-MCNC: 9.2 MG/DL (ref 8.3–10.6)
CHLORIDE BLD-SCNC: 104 MMOL/L (ref 99–110)
CLARITY: ABNORMAL
CLARITY: CLEAR
CO2: 22 MMOL/L (ref 21–32)
COLOR: ABNORMAL
COLOR: YELLOW
CREAT SERPL-MCNC: 0.5 MG/DL (ref 0.6–1.1)
DIFFERENTIAL TYPE: ABNORMAL
EKG ATRIAL RATE: 86 BPM
EKG DIAGNOSIS: NORMAL
EKG P AXIS: 34 DEGREES
EKG P-R INTERVAL: 140 MS
EKG Q-T INTERVAL: 366 MS
EKG QRS DURATION: 88 MS
EKG QTC CALCULATION (BAZETT): 437 MS
EKG R AXIS: 57 DEGREES
EKG T AXIS: 22 DEGREES
EKG VENTRICULAR RATE: 86 BPM
EOSINOPHILS ABSOLUTE: 0.3 K/CU MM
EOSINOPHILS RELATIVE PERCENT: 2.3 % (ref 0–3)
GFR AFRICAN AMERICAN: >60 ML/MIN/1.73M2
GFR NON-AFRICAN AMERICAN: >60 ML/MIN/1.73M2
GLUCOSE BLD-MCNC: 100 MG/DL (ref 70–99)
GLUCOSE, URINE: NEGATIVE MG/DL
GLUCOSE, URINE: NEGATIVE MG/DL
HCT VFR BLD CALC: 35.5 % (ref 37–47)
HEMOGLOBIN: 12.1 GM/DL (ref 12.5–16)
IMMATURE NEUTROPHIL %: 0.6 % (ref 0–0.43)
KETONES, URINE: ABNORMAL MG/DL
KETONES, URINE: NEGATIVE MG/DL
LEUKOCYTE ESTERASE, URINE: NEGATIVE
LEUKOCYTE ESTERASE, URINE: NEGATIVE
LIPASE: 17 IU/L (ref 13–60)
LYMPHOCYTES ABSOLUTE: 2.2 K/CU MM
LYMPHOCYTES RELATIVE PERCENT: 19.9 % (ref 24–44)
MCH RBC QN AUTO: 28.9 PG (ref 27–31)
MCHC RBC AUTO-ENTMCNC: 34.1 % (ref 32–36)
MCV RBC AUTO: 84.9 FL (ref 78–100)
MONOCYTES ABSOLUTE: 0.6 K/CU MM
MONOCYTES RELATIVE PERCENT: 5.5 % (ref 0–4)
MUCUS: ABNORMAL HPF
NITRITE URINE, QUANTITATIVE: NEGATIVE
NITRITE URINE, QUANTITATIVE: NEGATIVE
NUCLEATED RBC %: 0 %
PDW BLD-RTO: 15.7 % (ref 11.7–14.9)
PH, URINE: 7 (ref 5–8)
PH, URINE: 7 (ref 5–8)
PLATELET # BLD: 374 K/CU MM (ref 140–440)
PMV BLD AUTO: 8.9 FL (ref 7.5–11.1)
POTASSIUM SERPL-SCNC: 3.7 MMOL/L (ref 3.5–5.1)
PROTEIN UA: NEGATIVE MG/DL
PROTEIN UA: NEGATIVE MG/DL
RBC # BLD: 4.18 M/CU MM (ref 4.2–5.4)
RBC URINE: 106 /HPF (ref 0–6)
RBC URINE: 22 /HPF (ref 0–6)
SEGMENTED NEUTROPHILS ABSOLUTE COUNT: 7.9 K/CU MM
SEGMENTED NEUTROPHILS RELATIVE PERCENT: 71.4 % (ref 36–66)
SODIUM BLD-SCNC: 134 MMOL/L (ref 135–145)
SPECIFIC GRAVITY UA: 1.01 (ref 1–1.03)
SPECIFIC GRAVITY UA: 1.01 (ref 1–1.03)
SQUAMOUS EPITHELIAL: 1 /HPF
SQUAMOUS EPITHELIAL: 20 /HPF
TOTAL IMMATURE NEUTOROPHIL: 0.07 K/CU MM
TOTAL NUCLEATED RBC: 0 K/CU MM
TOTAL PROTEIN: 6.8 GM/DL (ref 6.4–8.2)
TRICHOMONAS: ABNORMAL /HPF
TRICHOMONAS: ABNORMAL /HPF
UNCLASSIFIED CRYSTAL: ABNORMAL /HPF
UROBILINOGEN, URINE: NEGATIVE MG/DL (ref 0.2–1)
UROBILINOGEN, URINE: NEGATIVE MG/DL (ref 0.2–1)
WBC # BLD: 11.1 K/CU MM (ref 4–10.5)
WBC CLUMP: ABNORMAL /HPF
WBC UA: 2 /HPF (ref 0–5)
WBC UA: 7 /HPF (ref 0–5)
YEAST: ABNORMAL /HPF

## 2021-05-05 PROCEDURE — 87086 URINE CULTURE/COLONY COUNT: CPT

## 2021-05-05 PROCEDURE — 6370000000 HC RX 637 (ALT 250 FOR IP): Performed by: ANESTHESIOLOGY

## 2021-05-05 PROCEDURE — 6360000002 HC RX W HCPCS: Performed by: NURSE PRACTITIONER

## 2021-05-05 PROCEDURE — 2709999900 HC NON-CHARGEABLE SUPPLY: Performed by: UROLOGY

## 2021-05-05 PROCEDURE — 6370000000 HC RX 637 (ALT 250 FOR IP): Performed by: UROLOGY

## 2021-05-05 PROCEDURE — 6360000002 HC RX W HCPCS: Performed by: NURSE ANESTHETIST, CERTIFIED REGISTERED

## 2021-05-05 PROCEDURE — 76857 US EXAM PELVIC LIMITED: CPT

## 2021-05-05 PROCEDURE — 76000 FLUOROSCOPY <1 HR PHYS/QHP: CPT

## 2021-05-05 PROCEDURE — 85025 COMPLETE CBC W/AUTO DIFF WBC: CPT

## 2021-05-05 PROCEDURE — G0378 HOSPITAL OBSERVATION PER HR: HCPCS

## 2021-05-05 PROCEDURE — 2580000003 HC RX 258: Performed by: ANESTHESIOLOGY

## 2021-05-05 PROCEDURE — 96365 THER/PROPH/DIAG IV INF INIT: CPT

## 2021-05-05 PROCEDURE — 1220000000 HC SEMI PRIVATE OB R&B

## 2021-05-05 PROCEDURE — 6370000000 HC RX 637 (ALT 250 FOR IP): Performed by: NURSE PRACTITIONER

## 2021-05-05 PROCEDURE — 99284 EMERGENCY DEPT VISIT MOD MDM: CPT

## 2021-05-05 PROCEDURE — 6370000000 HC RX 637 (ALT 250 FOR IP): Performed by: EMERGENCY MEDICINE

## 2021-05-05 PROCEDURE — 6360000002 HC RX W HCPCS: Performed by: PHYSICIAN ASSISTANT

## 2021-05-05 PROCEDURE — 6360000002 HC RX W HCPCS: Performed by: UROLOGY

## 2021-05-05 PROCEDURE — 81001 URINALYSIS AUTO W/SCOPE: CPT

## 2021-05-05 PROCEDURE — 7100000000 HC PACU RECOVERY - FIRST 15 MIN: Performed by: UROLOGY

## 2021-05-05 PROCEDURE — 93010 ELECTROCARDIOGRAM REPORT: CPT | Performed by: INTERNAL MEDICINE

## 2021-05-05 PROCEDURE — 3700000000 HC ANESTHESIA ATTENDED CARE: Performed by: UROLOGY

## 2021-05-05 PROCEDURE — 96375 TX/PRO/DX INJ NEW DRUG ADDON: CPT

## 2021-05-05 PROCEDURE — 76705 ECHO EXAM OF ABDOMEN: CPT

## 2021-05-05 PROCEDURE — 6370000000 HC RX 637 (ALT 250 FOR IP): Performed by: HOSPITALIST

## 2021-05-05 PROCEDURE — 2580000003 HC RX 258: Performed by: PHYSICIAN ASSISTANT

## 2021-05-05 PROCEDURE — 3600000012 HC SURGERY LEVEL 2 ADDTL 15MIN: Performed by: UROLOGY

## 2021-05-05 PROCEDURE — 83690 ASSAY OF LIPASE: CPT

## 2021-05-05 PROCEDURE — 3600000002 HC SURGERY LEVEL 2 BASE: Performed by: UROLOGY

## 2021-05-05 PROCEDURE — 96361 HYDRATE IV INFUSION ADD-ON: CPT

## 2021-05-05 PROCEDURE — 7100000001 HC PACU RECOVERY - ADDTL 15 MIN: Performed by: UROLOGY

## 2021-05-05 PROCEDURE — 6370000000 HC RX 637 (ALT 250 FOR IP): Performed by: OBSTETRICS & GYNECOLOGY

## 2021-05-05 PROCEDURE — 2580000003 HC RX 258: Performed by: UROLOGY

## 2021-05-05 PROCEDURE — C1726 CATH, BAL DIL, NON-VASCULAR: HCPCS | Performed by: UROLOGY

## 2021-05-05 PROCEDURE — 3700000001 HC ADD 15 MINUTES (ANESTHESIA): Performed by: UROLOGY

## 2021-05-05 PROCEDURE — 87040 BLOOD CULTURE FOR BACTERIA: CPT

## 2021-05-05 PROCEDURE — 80053 COMPREHEN METABOLIC PANEL: CPT

## 2021-05-05 PROCEDURE — 2500000003 HC RX 250 WO HCPCS: Performed by: NURSE ANESTHETIST, CERTIFIED REGISTERED

## 2021-05-05 RX ORDER — ACETAMINOPHEN 325 MG/1
650 TABLET ORAL EVERY 4 HOURS PRN
Status: DISCONTINUED | OUTPATIENT
Start: 2021-05-05 | End: 2021-05-06 | Stop reason: HOSPADM

## 2021-05-05 RX ORDER — MORPHINE SULFATE 4 MG/ML
4 INJECTION, SOLUTION INTRAMUSCULAR; INTRAVENOUS ONCE
Status: COMPLETED | OUTPATIENT
Start: 2021-05-05 | End: 2021-05-05

## 2021-05-05 RX ORDER — PHENYLEPHRINE HCL IN 0.9% NACL 1 MG/10 ML
SYRINGE (ML) INTRAVENOUS PRN
Status: DISCONTINUED | OUTPATIENT
Start: 2021-05-05 | End: 2021-05-05 | Stop reason: SDUPTHER

## 2021-05-05 RX ORDER — CHLOROPROCAINE HYDROCHLORIDE 30 MG/ML
INJECTION, SOLUTION EPIDURAL; INFILTRATION; INTRACAUDAL; PERINEURAL PRN
Status: DISCONTINUED | OUTPATIENT
Start: 2021-05-05 | End: 2021-05-05 | Stop reason: SDUPTHER

## 2021-05-05 RX ORDER — HYDROXYZINE PAMOATE 25 MG/1
25 CAPSULE ORAL 4 TIMES DAILY PRN
Status: DISCONTINUED | OUTPATIENT
Start: 2021-05-05 | End: 2021-05-06 | Stop reason: HOSPADM

## 2021-05-05 RX ORDER — ESCITALOPRAM OXALATE 10 MG/1
10 TABLET ORAL DAILY
Status: DISCONTINUED | OUTPATIENT
Start: 2021-05-06 | End: 2021-05-06 | Stop reason: HOSPADM

## 2021-05-05 RX ORDER — SODIUM CHLORIDE, SODIUM LACTATE, POTASSIUM CHLORIDE, CALCIUM CHLORIDE 600; 310; 30; 20 MG/100ML; MG/100ML; MG/100ML; MG/100ML
INJECTION, SOLUTION INTRAVENOUS CONTINUOUS
Status: DISCONTINUED | OUTPATIENT
Start: 2021-05-05 | End: 2021-05-06 | Stop reason: HOSPADM

## 2021-05-05 RX ORDER — HYDROCODONE BITARTRATE AND ACETAMINOPHEN 5; 325 MG/1; MG/1
1 TABLET ORAL EVERY 4 HOURS PRN
Status: DISCONTINUED | OUTPATIENT
Start: 2021-05-05 | End: 2021-05-06 | Stop reason: HOSPADM

## 2021-05-05 RX ORDER — ONDANSETRON 2 MG/ML
4 INJECTION INTRAMUSCULAR; INTRAVENOUS EVERY 6 HOURS PRN
Status: DISCONTINUED | OUTPATIENT
Start: 2021-05-05 | End: 2021-05-06 | Stop reason: HOSPADM

## 2021-05-05 RX ORDER — MORPHINE SULFATE 2 MG/ML
2 INJECTION, SOLUTION INTRAMUSCULAR; INTRAVENOUS ONCE
Status: COMPLETED | OUTPATIENT
Start: 2021-05-05 | End: 2021-05-05

## 2021-05-05 RX ORDER — PROPOFOL 10 MG/ML
INJECTION, EMULSION INTRAVENOUS PRN
Status: DISCONTINUED | OUTPATIENT
Start: 2021-05-05 | End: 2021-05-05 | Stop reason: SDUPTHER

## 2021-05-05 RX ORDER — SODIUM CHLORIDE 9 MG/ML
25 INJECTION, SOLUTION INTRAVENOUS PRN
Status: DISCONTINUED | OUTPATIENT
Start: 2021-05-05 | End: 2021-05-06 | Stop reason: HOSPADM

## 2021-05-05 RX ORDER — SODIUM CHLORIDE 0.9 % (FLUSH) 0.9 %
5-40 SYRINGE (ML) INJECTION EVERY 12 HOURS SCHEDULED
Status: DISCONTINUED | OUTPATIENT
Start: 2021-05-05 | End: 2021-05-05

## 2021-05-05 RX ORDER — LIDOCAINE HYDROCHLORIDE 20 MG/ML
JELLY TOPICAL PRN
Status: DISCONTINUED | OUTPATIENT
Start: 2021-05-05 | End: 2021-05-05

## 2021-05-05 RX ORDER — SODIUM CHLORIDE 9 MG/ML
INJECTION, SOLUTION INTRAVENOUS CONTINUOUS
Status: DISCONTINUED | OUTPATIENT
Start: 2021-05-05 | End: 2021-05-05

## 2021-05-05 RX ORDER — SODIUM CHLORIDE 0.9 % (FLUSH) 0.9 %
5-40 SYRINGE (ML) INJECTION PRN
Status: DISCONTINUED | OUTPATIENT
Start: 2021-05-05 | End: 2021-05-06 | Stop reason: HOSPADM

## 2021-05-05 RX ORDER — IPRATROPIUM BROMIDE AND ALBUTEROL SULFATE 2.5; .5 MG/3ML; MG/3ML
1 SOLUTION RESPIRATORY (INHALATION) ONCE
Status: COMPLETED | OUTPATIENT
Start: 2021-05-05 | End: 2021-05-05

## 2021-05-05 RX ORDER — SODIUM CHLORIDE, SODIUM LACTATE, POTASSIUM CHLORIDE, CALCIUM CHLORIDE 600; 310; 30; 20 MG/100ML; MG/100ML; MG/100ML; MG/100ML
INJECTION, SOLUTION INTRAVENOUS CONTINUOUS
Status: DISCONTINUED | OUTPATIENT
Start: 2021-05-05 | End: 2021-05-05

## 2021-05-05 RX ORDER — ALBUTEROL SULFATE 90 UG/1
2 AEROSOL, METERED RESPIRATORY (INHALATION) EVERY 6 HOURS PRN
Status: DISCONTINUED | OUTPATIENT
Start: 2021-05-05 | End: 2021-05-06 | Stop reason: HOSPADM

## 2021-05-05 RX ORDER — ACETAMINOPHEN 325 MG/1
650 TABLET ORAL ONCE
Status: COMPLETED | OUTPATIENT
Start: 2021-05-05 | End: 2021-05-05

## 2021-05-05 RX ORDER — 0.9 % SODIUM CHLORIDE 0.9 %
1000 INTRAVENOUS SOLUTION INTRAVENOUS ONCE
Status: COMPLETED | OUTPATIENT
Start: 2021-05-05 | End: 2021-05-05

## 2021-05-05 RX ORDER — METRONIDAZOLE 250 MG/1
500 TABLET ORAL EVERY 12 HOURS SCHEDULED
Status: DISCONTINUED | OUTPATIENT
Start: 2021-05-05 | End: 2021-05-06 | Stop reason: HOSPADM

## 2021-05-05 RX ADMIN — PROPOFOL 50 MG: 10 INJECTION, EMULSION INTRAVENOUS at 13:47

## 2021-05-05 RX ADMIN — SODIUM CHLORIDE, POTASSIUM CHLORIDE, SODIUM LACTATE AND CALCIUM CHLORIDE: 600; 310; 30; 20 INJECTION, SOLUTION INTRAVENOUS at 23:51

## 2021-05-05 RX ADMIN — CEFAZOLIN SODIUM 2000 MG: 10 INJECTION, POWDER, FOR SOLUTION INTRAVENOUS at 18:30

## 2021-05-05 RX ADMIN — PROPOFOL 50 MG: 10 INJECTION, EMULSION INTRAVENOUS at 13:39

## 2021-05-05 RX ADMIN — PROPOFOL 50 MG: 10 INJECTION, EMULSION INTRAVENOUS at 13:37

## 2021-05-05 RX ADMIN — TERCONAZOLE 1 APPLICATOR: 8 CREAM VAGINAL at 23:46

## 2021-05-05 RX ADMIN — SODIUM CHLORIDE 1000 ML: 9 INJECTION, SOLUTION INTRAVENOUS at 06:24

## 2021-05-05 RX ADMIN — Medication 100 MCG: at 13:54

## 2021-05-05 RX ADMIN — SODIUM CHLORIDE, PRESERVATIVE FREE 10 ML: 5 INJECTION INTRAVENOUS at 20:49

## 2021-05-05 RX ADMIN — CHLOROPROCAINE HYDROCHLORIDE 1.5 ML: 30 INJECTION, SOLUTION EPIDURAL; INFILTRATION; INTRACAUDAL; PERINEURAL at 13:26

## 2021-05-05 RX ADMIN — Medication 50 MCG: at 13:41

## 2021-05-05 RX ADMIN — PROPOFOL 50 MG: 10 INJECTION, EMULSION INTRAVENOUS at 13:42

## 2021-05-05 RX ADMIN — Medication 50 MCG: at 13:43

## 2021-05-05 RX ADMIN — SODIUM CHLORIDE, POTASSIUM CHLORIDE, SODIUM LACTATE AND CALCIUM CHLORIDE: 600; 310; 30; 20 INJECTION, SOLUTION INTRAVENOUS at 11:50

## 2021-05-05 RX ADMIN — IPRATROPIUM BROMIDE AND ALBUTEROL SULFATE 1 AMPULE: .5; 3 SOLUTION RESPIRATORY (INHALATION) at 14:23

## 2021-05-05 RX ADMIN — CEFAZOLIN SODIUM 2000 MG: 10 INJECTION, POWDER, FOR SOLUTION INTRAVENOUS at 08:53

## 2021-05-05 RX ADMIN — PROPOFOL 50 MG: 10 INJECTION, EMULSION INTRAVENOUS at 13:50

## 2021-05-05 RX ADMIN — PROPOFOL 50 MG: 10 INJECTION, EMULSION INTRAVENOUS at 13:34

## 2021-05-05 RX ADMIN — MORPHINE SULFATE 2 MG: 2 INJECTION, SOLUTION INTRAMUSCULAR; INTRAVENOUS at 15:10

## 2021-05-05 RX ADMIN — ONDANSETRON 4 MG: 2 INJECTION INTRAMUSCULAR; INTRAVENOUS at 20:43

## 2021-05-05 RX ADMIN — Medication 100 MCG: at 13:36

## 2021-05-05 RX ADMIN — HYDROCODONE BITARTRATE AND ACETAMINOPHEN 1 TABLET: 5; 325 TABLET ORAL at 18:44

## 2021-05-05 RX ADMIN — ACETAMINOPHEN 650 MG: 325 TABLET ORAL at 06:06

## 2021-05-05 RX ADMIN — MORPHINE SULFATE 4 MG: 4 INJECTION, SOLUTION INTRAMUSCULAR; INTRAVENOUS at 20:41

## 2021-05-05 RX ADMIN — MORPHINE SULFATE 4 MG: 4 INJECTION, SOLUTION INTRAMUSCULAR; INTRAVENOUS at 06:24

## 2021-05-05 RX ADMIN — HYDROCODONE BITARTRATE AND ACETAMINOPHEN 1 TABLET: 5; 325 TABLET ORAL at 15:02

## 2021-05-05 RX ADMIN — PROPOFOL 50 MG: 10 INJECTION, EMULSION INTRAVENOUS at 13:45

## 2021-05-05 RX ADMIN — PROPOFOL 20 MG: 10 INJECTION, EMULSION INTRAVENOUS at 13:52

## 2021-05-05 RX ADMIN — PROPOFOL 50 MG: 10 INJECTION, EMULSION INTRAVENOUS at 13:35

## 2021-05-05 RX ADMIN — METRONIDAZOLE 500 MG: 250 TABLET ORAL at 22:12

## 2021-05-05 ASSESSMENT — PAIN DESCRIPTION - FREQUENCY
FREQUENCY: CONTINUOUS
FREQUENCY: INTERMITTENT
FREQUENCY: CONTINUOUS

## 2021-05-05 ASSESSMENT — PULMONARY FUNCTION TESTS
PIF_VALUE: 0
PIF_VALUE: 1

## 2021-05-05 ASSESSMENT — PAIN SCALES - GENERAL
PAINLEVEL_OUTOF10: 4
PAINLEVEL_OUTOF10: 8
PAINLEVEL_OUTOF10: 0
PAINLEVEL_OUTOF10: 3
PAINLEVEL_OUTOF10: 7
PAINLEVEL_OUTOF10: 5
PAINLEVEL_OUTOF10: 7
PAINLEVEL_OUTOF10: 7
PAINLEVEL_OUTOF10: 10
PAINLEVEL_OUTOF10: 10

## 2021-05-05 ASSESSMENT — PAIN DESCRIPTION - LOCATION
LOCATION: ABDOMEN

## 2021-05-05 ASSESSMENT — ENCOUNTER SYMPTOMS
COLOR CHANGE: 0
SORE THROAT: 0
EYE DISCHARGE: 0
ABDOMINAL PAIN: 1
RHINORRHEA: 0
COUGH: 1
BLOOD IN STOOL: 0
WHEEZING: 0
SHORTNESS OF BREATH: 0
VOMITING: 0
DIARRHEA: 0
EYE ITCHING: 0
NAUSEA: 1

## 2021-05-05 ASSESSMENT — PAIN DESCRIPTION - PROGRESSION
CLINICAL_PROGRESSION: NOT CHANGED
CLINICAL_PROGRESSION: NOT CHANGED
CLINICAL_PROGRESSION: GRADUALLY IMPROVING
CLINICAL_PROGRESSION: GRADUALLY WORSENING
CLINICAL_PROGRESSION: GRADUALLY WORSENING

## 2021-05-05 ASSESSMENT — PAIN DESCRIPTION - PAIN TYPE
TYPE: SURGICAL PAIN
TYPE: ACUTE PAIN
TYPE: SURGICAL PAIN
TYPE: SURGICAL PAIN
TYPE: ACUTE PAIN
TYPE: SURGICAL PAIN
TYPE: SURGICAL PAIN

## 2021-05-05 ASSESSMENT — PAIN DESCRIPTION - DESCRIPTORS
DESCRIPTORS: ACHING;DISCOMFORT
DESCRIPTORS: DISCOMFORT
DESCRIPTORS: STABBING;BURNING
DESCRIPTORS: CRAMPING
DESCRIPTORS: ACHING

## 2021-05-05 ASSESSMENT — PAIN DESCRIPTION - ORIENTATION
ORIENTATION: RIGHT;MID;LOWER
ORIENTATION: RIGHT;UPPER
ORIENTATION: RIGHT
ORIENTATION: RIGHT;MID;LOWER
ORIENTATION: RIGHT
ORIENTATION: RIGHT;MID;LOWER

## 2021-05-05 ASSESSMENT — PAIN - FUNCTIONAL ASSESSMENT
PAIN_FUNCTIONAL_ASSESSMENT: ACTIVITIES ARE NOT PREVENTED
PAIN_FUNCTIONAL_ASSESSMENT: 0-10

## 2021-05-05 ASSESSMENT — PAIN DESCRIPTION - ONSET: ONSET: ON-GOING

## 2021-05-05 NOTE — ANESTHESIA POSTPROCEDURE EVALUATION
Department of Anesthesiology  Postprocedure Note    Patient: Jenny Eastman  MRN: [id number]  YOB: 1995  Date of evaluation: 5/5/2021  Time:  2:08 PM     Procedure Summary     Date: 05/05/21 Room / Location: [place] [address] / [facility]    Anesthesia Start: 3796 Anesthesia Stop: 6515    Procedure: RIGHT CYSTOSCOPY URETERAL STENT INSERTION, POSSIBLE RETROGRADE PYELOGRAM WITH STONE MANIPULATION (Right ) Diagnosis: (ABD PAIN)    Surgeons: Ervin Benitez MD Responsible Provider: Misa Tovar MD    Anesthesia Type: Not recorded ASA Status: Not recorded          Anesthesia Type: No value filed. Rajeev Phase I:      Rajeev Phase II:      Last vitals: Reviewed and per EMR flowsheets.        Anesthesia Post Evaluation    Patient location during evaluation: bedside  Patient participation: complete - patient participated  Level of consciousness: awake and alert  Pain score: 0  Airway patency: patent  Nausea & Vomiting: no vomiting and no nausea  Complications: no  Cardiovascular status: blood pressure returned to baseline and hemodynamically stable  Respiratory status: acceptable, room air, spontaneous ventilation and nonlabored ventilation  Hydration status: stable

## 2021-05-05 NOTE — ED PROVIDER NOTES
As physician-in-triage, I performed a medical screening history and physical exam on this patient. HISTORY OF PRESENT ILLNESS  Lilly Cardoso is a 31 y.o. female and second trimester pregnancy with acute onset right upper quadrant pain without nausea or vomiting. She was seen yesterday and has been having a cough for a few days. Denies vaginal bleeding. PHYSICAL EXAM  No vitals available at time of my evaluation. On exam, the patient appears in no acute distress. Speech is clear. Breathing is unlabored. Moves all extremities.         Jai Pugh MD  05/05/21 7922

## 2021-05-05 NOTE — ED PROVIDER NOTES
status: Current Every Day Smoker     Packs/day: 1.00     Types: Cigarettes     Last attempt to quit: 3/3/2015     Years since quittin.1    Smokeless tobacco: Never Used   Substance and Sexual Activity    Alcohol use: Yes     Comment: occ-wine 6 glasses during the pregnancy    Drug use: No    Sexual activity: Never   Lifestyle    Physical activity     Days per week: None     Minutes per session: None    Stress: None   Relationships    Social connections     Talks on phone: None     Gets together: None     Attends Shinto service: None     Active member of club or organization: None     Attends meetings of clubs or organizations: None     Relationship status: None    Intimate partner violence     Fear of current or ex partner: None     Emotionally abused: None     Physically abused: None     Forced sexual activity: None   Other Topics Concern    None   Social History Narrative    None     Family History   Problem Relation Age of Onset    Cancer Other     Diabetes Other        PHYSICAL EXAM    VITAL SIGNS: BP (!) 140/79   Pulse 87   Temp 98.8 °F (37.1 °C) (Oral)   Resp 16   Ht 5' 3\" (1.6 m)   Wt 225 lb (102.1 kg)   LMP 2020   SpO2 100%   BMI 39.86 kg/m²   General:  Well developed, elevated BMI, tearful and in moderate acute pain. Eyes:  Sclera nonicteric, Conjunctiva moist, No discharge  Head:  Normocephalic, Atramautic  Neck/Lymphatics: Supple, no JVD, no swollen nodes  Respiratory:  Clear to ausculation bilaterally, No retractions, Non labored breathing  Cardiovascular:  RRR, No murmurs/gallops/thrills  GI:   No gross discoloration. Bowel sounds present in all quadrants, No audible bruits. Soft,  Nondistended. +moderate epigastic/RUQ abdominal tenderness without rebound tenderness or guarding, No palpable pulsatile masses or obvious hernias. No McBurney's point tenderness.  Negative Rovsing sign   Back:  No CVA tenderness to percussion bilaterally  Musculoskeletal:  No edema, No at the right ureterovesical junction   measuring 7 mm.  No ureteral jets were visualized on the right.                     US ABDOMEN LIMITED (Final result)  Result time 05/05/21 08:06:00  Final result by Rolando Cross MD (05/05/21 08:06:00)                Impression:    Mild right hydronephrosis.  Small calculus within the urinary bladder     No evidence of cholelithiasis             Narrative:    EXAMINATION:   RIGHT UPPER QUADRANT ULTRASOUND     5/5/2021 7:13 am     COMPARISON:   None. HISTORY:   ORDERING SYSTEM PROVIDED HISTORY: RUQ pain   TECHNOLOGIST PROVIDED HISTORY:   Reason for exam:->RUQ pain   Reason for Exam: RUQ / Rt flank pain, hematuria   Acuity: Acute   Type of Exam: Initial     FINDINGS:   LIVER:  The liver demonstrates normal echogenicity without evidence of   intrahepatic biliary ductal dilatation. BILIARY SYSTEM:  Gallbladder is unremarkable without evidence of   pericholecystic fluid, wall thickening or stones.  Negative sonographic   Melendez's sign. Common bile duct is within normal limits measuring 5 mm. RIGHT KIDNEY: Mild right hydronephrosis is identified. Lenore Guise is a calculus   within the urinary bladder. PANCREAS:  The pancreas is suboptimally visualized. OTHER: No evidence of right upper quadrant ascites.                 ECG Results          ED COURSE & MEDICAL DECISION MAKING      Vital signs and nursing notes reviewed during ED course. I have independently evaluated this patient . Supervising MD - Dr Felipa Jarvis - present in the Emergency Department, available for consultation, throughout entirety of  patient care. All pertinent Lab data and radiographic results reviewed with patient at bedside. The patient and / or the family were informed of the results of any tests, a time was given to answer questions, a plan was proposed and they agreed with plan.          Differential diagnosis: Abdominal Aortic Aneurysm, Ischemic Bowel, Bowel Obstruction, Acute Cholecystitis, Acute Appendicitis, other    Clinical  IMPRESSION    1. Pregnancy complicated by ureteral obstruction in second trimester    2. Ureteral stone with hydronephrosis        Patient presents with right upper quadrant abdominal pain and nausea in pregnancy that began this morning. Work-up was initiated triage. On exam, tearful 79-year-old female, moderately uncomfortable appearing but nontoxic. Abdomen is soft nondistended with tenderness in the right upper quadrant and epigastric region. No localized tenderness in the right lower quadrant over McBurney's point. No CVA tenderness to percussion. Patient was initially given oral Tylenol by triage provider and on my assessment, she is continuing to have 10/10 pain. Risk versus benefits of additional pain control including narcotics in pregnancy was discussed with patient, including risks to the developing fetuses, and she verbalizes understanding of the risks. I did order additional IV fluids and morphine at this time following discussion with patient. Labs today reassuring. CBC stable compared to previous, white count is 11.1. CMP reveals normal renal function, LFTs bilirubin and lipase. On chart review, patient's UA from yesterday's ED visit did show small amount of gross blood with 5 red blood cells, otherwise negative for bacteria leukocytes and white blood cells. We will plan to repeat UA today however move forward with right upper quadrant ultrasound to rule out gallbladder pathology as well as renal ultrasound to evaluate for obstructive uropathy. UA today showed increase gross blood, moderate with 106 red blood cells, 7 white blood cells and rare bacteria, sample is contaminated with 20 squamous epithelial cells with many yeast noted. Negative for leukocytes and nitrites. Did send for urine culture. While in the ED, patient's urine was strained by ED nurse and was found to have sediments.   Right upper quadrant abdominal ultrasound shows no evidence of cholelithiasis/cholecystitis. Renal ultrasound does show mild right hydronephrosis with a small calculus within the urinary bladder as well as a 7 mm stone in the region of the right UVJ. Follow medication the ED, patient is feeling more comfortable. We discussed consult with urology for likely admission given her pregnancy status as well as location and size of stone. Patient is comfortable and agreeable with this. I did consult with Dr. Erin Silveira - urology - and discussed patient's history, ED presentation/course including any pertinent laboratory findings and imaging study findings. He/she agrees with plan for admission through medicine versus OB, would like patient started empirically on IV Ancef with n.p.o. status and likely procedure this afternoon for uteroscopy. Would also like order for pre and post procedural fetal heart tones by admitting physician. I did speak with patient's OB, Dr. Dina Aragon who defers admission to hospitalist medicine as patient is under 22 weeks gestation. Hospitalist Dr. Adan Moncada agrees to hospital admission. Patient is admitted to the hospital in stable condition. Patient was evaluated in the ED by urology PA, Yoseph Almonte, who is requesting a repeat UA sample given the amount of squamous cells on initial UA, repeat UA pending at time of this evaluation. In consideration of current COVID19 pandemic, with effort to minimize unnecessary provider exposure, this patient was seen at bedside by me independently. However, in compliance with current hospital MOISES/ED protocol, prior to admission I did discuss this patient case with emergency department physician, Dr. Charlie Mederos, who did agree with ED workup/evaluation and plan for admission. Of note, this Pt was NOT admitted to the ICU.       Comment: Please note this report has been produced using speech recognition software and may contain errors related to that system including errors in grammar, punctuation, and spelling, as well as words and phrases that may be inappropriate. If there are any questions or concerns please feel free to contact the dictating provider for clarification.           Ernie Childs PA-C  05/05/21 1014

## 2021-05-05 NOTE — ED NOTES
[phone] paged Dr Ann Qiu  05/05/21 0312 [id number] Dr Huber Doan returned call      Sujatha Rai  05/05/21 7582

## 2021-05-05 NOTE — ANESTHESIA PRE PROCEDURE
Department of Anesthesiology  Preprocedure Note       Name:  Jami Zuñiga   Age:  31 y.o.  :  1995                                          MRN:  [id number]         Date:  2021      Surgeon: Rylee Burnette):  Sommer Sanz MD    Procedure: Procedure(s):  RIGHT CYSTOSCOPY URETERAL STENT INSERTION, POSSIBLE RETROGRADE PYELOGRAM WITH STONE MANIPULATION    Medications prior to admission:   Prior to Admission medications    Medication Sig Start Date End Date Taking? Authorizing Provider   albuterol sulfate HFA (PROVENTIL HFA) 108 (90 Base) MCG/ACT inhaler Inhale 2 puffs into the lungs every 4 hours as needed for Wheezing or Shortness of Breath With spacer (and mask if indicated). Thanks.  5/4/21 6/3/21 Yes Darren Cameron,    guaiFENesin (ROBITUSSIN) 200 MG/10ML SOLN oral solution Take 10 mLs by mouth every 4 hours as needed for Cough 21  Yes Clance Nones, DO   albuterol sulfate  (90 Base) MCG/ACT inhaler INHALE 2 PUFFS PO EVERY 4-6 HOURS PRN 12/15/19  Yes Historical Provider, MD   Prenatal MV-Min-Fe Fum-FA-DHA (PRENATAL 1) 30-0.975-200 MG CAPS Take 1 tablet by mouth daily 19  Yes Jaron Brown MD   labetalol (NORMODYNE) 100 MG tablet Take 1 tablet by mouth every 12 hours 2/3/20   Sergio Peters MD   ibuprofen (ADVIL;MOTRIN) 800 MG tablet Take 1 tablet by mouth every 8 hours 20   Radha Allen MD   FLUoxetine [facility]) 40 MG capsule Take 1 capsule by mouth daily 20   Radha Allen MD       Current medications:    Current Facility-Administered Medications   Medication Dose Route Frequency Provider Last Rate Last Admin    lidocaine (XYLOCAINE) 2 % uro-jet   Topical PRN Korinne Lusterio, PA-C        0.9 % sodium chloride infusion   Intravenous Continuous Mary Bernard MD        sodium chloride flush 0.9 % injection 5-40 mL  5-40 mL Intravenous 2 times per day Mary Bernard MD        sodium chloride flush 0.9 % injection 5-40 mL  5-40 mL Intravenous PRN Mary Bernard MD  0.9 % sodium chloride infusion  25 mL Intravenous PRN Saúl Reddy MD        acetaminophen (TYLENOL) tablet 650 mg  650 mg Oral Q4H PRN Saúl Reddy MD        lactated ringers infusion   Intravenous Continuous Gus Scruggs  mL/hr at 21 1150 New Bag at 21 1150       Allergies:     Allergies   Allergen Reactions    Gold Rash     Gold jewelry    Silver Rash     Silver jewelry       Problem List:    Patient Active Problem List   Diagnosis Code    Thrombosed external hemorrhoid K64.5    Anal pain K62.89    Encounter for pregnancy related examination in third trimester Z34.93    Mild preeclampsia, third trimester O14.03    Kidney stone N20.0       Past Medical History:        Diagnosis Date    Anxiety disorder     Asthma     Depression        Past Surgical History:        Procedure Laterality Date     SECTION       SECTION N/A 2020     SECTION performed by Reagan Thomas MD at [facility] L&D OR       Social History:    Social History     Tobacco Use    Smoking status: Current Every Day Smoker     Packs/day: 1.00     Types: Cigarettes     Last attempt to quit: 3/3/2015     Years since quittin.1    Smokeless tobacco: Never Used   Substance Use Topics    Alcohol use: Yes     Comment: occ-wine 6 glasses during the pregnancy                                Ready to quit: Not Answered  Counseling given: Not Answered      Vital Signs (Current):   Vitals:    21 0757 21 0855 21 1123 21 1125   BP:   126/72    Pulse: 87 92 84    Resp: 16 28 22    Temp:    36.1 °C (96.9 °F)   TempSrc:   Temporal Temporal   SpO2: 100% 99% 98%    Weight:   225 lb (102.1 kg)    Height:   5' 3\" (1.6 m)                                               BP Readings from Last 3 Encounters:   21 126/72   21 (!) 93/55   21 (!) 119/55       NPO Status: Time of last liquid consumption: 0700                        Time of last solid consumption: 2230                        Date of last liquid consumption: 05/05/21                        Date of last solid food consumption: 05/04/21    BMI:   Wt Readings from Last 3 Encounters:   05/05/21 225 lb (102.1 kg)   05/04/21 225 lb (102.1 kg)   02/27/21 225 lb (102.1 kg)     Body mass index is 39.86 kg/m². CBC:   Lab Results   Component Value Date    WBC 11.1 05/05/2021    RBC 4.18 05/05/2021    HGB 12.1 05/05/2021    HCT 35.5 05/05/2021    MCV 84.9 05/05/2021    RDW 15.7 05/05/2021     05/05/2021       CMP:   Lab Results   Component Value Date     05/05/2021    K 3.7 05/05/2021     05/05/2021    CO2 22 05/05/2021    BUN 5 05/05/2021    CREATININE 0.5 05/05/2021    GFRAA >60 05/05/2021    LABGLOM >60 05/05/2021    GLUCOSE 100 05/05/2021    PROT 6.8 05/05/2021    CALCIUM 9.2 05/05/2021    BILITOT 0.1 05/05/2021    ALKPHOS 69 05/05/2021    AST 10 05/05/2021    ALT 6 05/05/2021       POC Tests: No results for input(s): POCGLU, POCNA, POCK, POCCL, POCBUN, POCHEMO, POCHCT in the last 72 hours.     Coags: No results found for: PROTIME, INR, APTT    HCG (If Applicable):   Lab Results   Component Value Date    PREGTESTUR neg 12/09/2013        ABGs: No results found for: PHART, PO2ART, UGU0LXK, QXY9QXP, BEART, R5CIDWLR     Type & Screen (If Applicable):  No results found for: LABABO, LABRH    Drug/Infectious Status (If Applicable):  No results found for: HIV, HEPCAB    COVID-19 Screening (If Applicable):   Lab Results   Component Value Date    COVID19 NOT DETECTED 05/04/2021    COVID19 DETECTED 02/11/2021           Anesthesia Evaluation    Airway: Mallampati: III  TM distance: >3 FB   Neck ROM: limited   Dental: normal exam         Pulmonary:normal exam    (+) asthma:                            Cardiovascular:    (+) hypertension:,         Rhythm: regular                      Neuro/Psych:   (+) depression/anxiety             GI/Hepatic/Renal:             Endo/Other:                      ROS comment: 15 weeks pregnant with twins Abdominal:   (+) obese,         Vascular:                                        Anesthesia Plan      MAC and spinal     ASA 2       Induction: intravenous. Anesthetic plan and risks discussed with patient. Plan discussed with attending.                   MARGARITO Buchanan - ELAINE   5/5/2021

## 2021-05-05 NOTE — ED NOTES
[id number] paged hospitalist     Gail Robles  05/05/21 [phone] repaged hospitalist     Gail Robles  05/05/21 [phone] hospitalist returned call      Gail Robles  05/05/21 [id number]

## 2021-05-05 NOTE — FLOWSHEET NOTE
05/05/21 1145   Fetal Heart Rate   Mode Doppler   Baseline Rate 153 bpm   Multiple birth?  Yes   Fetal Heart Rate Fetus B   Mode Doppler   Doppler/Fetoscope Rate 138 BPM       Performed By Paulette Brito RN

## 2021-05-05 NOTE — LETTER
[facility]  [address] [place] [address]  Phone: [phone]        May 6, 2021     Patient: Loren Peterson   YOB: 1995   Date of Visit: 5/5/2021       To Whom It May Concern:    Maurilio Vines was seen and treated in our hospital on 5/5/2021 until 5/6/2021. Please excuse her from work until 5/11/2021. If you have any questions or concerns, please don't hesitate to call.     Sincerely,        Domenico Marr MD

## 2021-05-05 NOTE — CONSULTS
[facility] [address]   Consult Note  [facility] 1 2 3 4 5    Date: 2021   Patient: Timo Garcia   : 1995   DOA: 2021   MRN: [id number]   ROOM#: [id number]     Reason for Consult:   Requesting Physician:  Rakesh Lewis PA-C  Collaborating Urologist on Call at time of admission: Dr. Meredith El: Abdominal pain    History Obtained From:  patient, electronic medical record    HISTORY OF PRESENT ILLNESS:                The patient is a 31 y.o. female who is 15wks pregnant with twin gestation (G4, P4) with significant past medical history of asthma who presented with abdominal pain since this morning at 4am with associated nausea. She was diagnosed with a 7mm right UVJ stone with mild right hydronephrosis on US in the ED. Denies h/o nephrolithiasis. No anticoagulation. Denies fevers/chills, vomiting, gross hematuria, dysuria, or other sx. Discussed recommendation for cystoscopy, right ureteroscopy/stone manipulation vs stent placement and associated risks/complications. Pt verbalizes understanding and is agreeable to proceed. ED Provider's HPI 21: Timo Garcia is a 31 y.o. female who presents with right upper quadrant abdominal pain. Onset was prior to arrival at 4 AM this morning. Context is patient is currently 15 weeks pregnant with twin gestation, G4, P3 with history of spontaneous miscarriage. Was seen in the emergency room yesterday for URI cough and cold complaints that have been ongoing for the last 4 weeks. She was asymptomatic for any abdominal pain at that time but did have a transvaginal ultrasound that did confirm twin live intrauterine pregnancies. She is discharged home with symptomatic management after negative Covid and rapid screening. She awoke this morning with 10/10 sharp dull aching pain in the right upper quadrant, constant with associated nausea. No lower abdominal pelvic cramping or abnormal vaginal discharge/bleeding. No fever or chills. No history of GI disorders or other abdominal surgical history other than cesareans. Did not try any medications prior to ED arrival relief of pain. No other sick contacts new foods or antibiotic use. No known alleviating or exacerbating factors. Pain does not rating to the back, no flank pain. Past Medical History:        Diagnosis Date    Anxiety disorder     Asthma     Depression      Past Surgical History:        Procedure Laterality Date     SECTION       SECTION N/A 2020     SECTION performed by Shannon Zelaya MD at [address] [facility] L&D OR     Current Medications:   Current Facility-Administered Medications: ceFAZolin (ANCEF) 2000 mg in dextrose 5 % 100 mL IVPB, 2,000 mg, Intravenous, Once    Allergies:  Gold and Silver    Social History:   TOBACCO:   reports that she has been smoking cigarettes. She has been smoking about 1.00 pack per day. She has never used smokeless tobacco.  ETOH:   reports current alcohol use. DRUGS:   reports no history of drug use. Family History:       Problem Relation Age of Onset    Cancer Other     Diabetes Other        REVIEW OF SYSTEMS:     CONSTITUTIONAL:  negative  RESPIRATORY:  negative  CARDIOVASCULAR:  negative  GASTROINTESTINAL:  positive for abdominal pain  GENITOURINARY:  negative    PHYSICAL EXAM:      VITALS:  BP (!) 140/79   Pulse 87   Temp 98.8 °F (37.1 °C) (Oral)   Resp 16   Ht 5' 3\" (1.6 m)   Wt 225 lb (102.1 kg)   LMP 2020   SpO2 100%   BMI 39.86 kg/m²      TEMPERATURE:  Current - Temp: 98.8 °F (37.1 °C);  Max - Temp  Av.6 °F (37 °C)  Min: 98.3 °F (36.8 °C)  Max: 98.8 °F (37.1 °C)  24HR BLOOD PRESSURE RANGE:  Systolic (87BYO), OWX:722 , Min:106 , QOI:333   ; Diastolic (89YEW), BSI:11, Min:55, Max:79    General appearance: alert, appears stated age, cooperative, no distress and moderately obese  Head: Normocephalic, without obvious abnormality, atraumatic  Back: Mild right CVA tenderness  Abdomen: soft, 15wk gravid abd with TTP in right side    DATA:    WBC:    Lab Results   Component Value Date    WBC 11.1 05/05/2021     Hemoglobin/Hematocrit:    Lab Results   Component Value Date    HGB 12.1 05/05/2021    HCT 35.5 05/05/2021     BMP:    Lab Results   Component Value Date     05/05/2021    K 3.7 05/05/2021     05/05/2021    CO2 22 05/05/2021    BUN 5 05/05/2021    LABALBU 3.4 05/05/2021    CREATININE 0.5 05/05/2021    CALCIUM 9.2 05/05/2021    GFRAA >60 05/05/2021    LABGLOM >60 05/05/2021     Urine Culture: pending    Imaging:  Us Ob Less Than 14 Weeks Single Or First Gestation    Result Date: 5/4/2021  EXAMINATION: TRANSABDOMINAL AND TRANSVAGINAL FIRST TRIMESTER OBSTETRIC PELVIC ULTRASOUND WITH COLOR DOPPLER FLOW; TRANSABDOMINAL SECOND/THIRD TRIMESTER OBSTETRIC PELVIC ULTRASOUND WITH COLOR DOPPLER FLOW 5/4/2021 TECHNIQUE: TRANSABDOMINAL AND TRANSVAGINAL PELVIC ULTRASOUND WITH COLOR DOPPLER FLOW; TRANSABDOMINAL PELVIC ULTRASOUND WITH COLOR DOPPLER FLOW HISTORY: ORDERING SYSTEM PROVIDED HISTORY: fatigue/pregnant TECHNOLOGIST PROVIDED HISTORY: Reason for exam:->fatigue/pregnant Reason for Exam: fatigue; SOB; Covid + in Feb Acuity: Acute Type of Exam: Initial FINDINGS: Twin live intrauterine pregnancies are present. Fetus A: Fetal heart rate measures 144 beats per minute. There is normal fetal body and limb movement. The fetus is in cephalic position. The placenta is located anteriorly. The cervix is closed measuring 3.35 cm in length. Fetal anatomic survey was not performed. BPD measures 2.74 cm. Head circumference measures 10.58 cm. Abdominal circumference measures 9.17 cm. Femur length measures 1.75 cm. Estimated fetal weight is 118.6 grams which correlates to 35th percentile based upon last menstrual period. Estimated gestational age by current ultrasound is 15 weeks, 1 day. Fetus B: Fetal heart rate measures 132 beats per minute.  There is normal fetal body and limb movement. The fetus is in breech position. The placenta is located posteriorly. The cervix is closed measuring 3.35 cm in length. Fetal anatomic survey was not performed. BPD measures 2.86 cm. Head circumference measures 11.4 cm. Abdominal circumference measures 9.3 cm. Femur length measures 1.7 cm. Estimated fetal weight is 119.82 grams which correlates to 38th percentile based upon last menstrual period. Estimated gestational age by current ultrasound is 15 weeks, 2 days. Twin live intrauterine pregnancies with gestational ages of 14 weeks, 1 day and 15 weeks, 2 days as detailed above. Estimated date of delivery is 10/24/2021 or 10/25/2021. Us Ob Greater Than 14 Weeks Additional Fetus    Result Date: 5/4/2021  EXAMINATION: TRANSABDOMINAL AND TRANSVAGINAL FIRST TRIMESTER OBSTETRIC PELVIC ULTRASOUND WITH COLOR DOPPLER FLOW; TRANSABDOMINAL SECOND/THIRD TRIMESTER OBSTETRIC PELVIC ULTRASOUND WITH COLOR DOPPLER FLOW 5/4/2021 TECHNIQUE: TRANSABDOMINAL AND TRANSVAGINAL PELVIC ULTRASOUND WITH COLOR DOPPLER FLOW; TRANSABDOMINAL PELVIC ULTRASOUND WITH COLOR DOPPLER FLOW HISTORY: ORDERING SYSTEM PROVIDED HISTORY: fatigue/pregnant TECHNOLOGIST PROVIDED HISTORY: Reason for exam:->fatigue/pregnant Reason for Exam: fatigue; SOB; Covid + in Feb Acuity: Acute Type of Exam: Initial FINDINGS: Twin live intrauterine pregnancies are present. Fetus A: Fetal heart rate measures 144 beats per minute. There is normal fetal body and limb movement. The fetus is in cephalic position. The placenta is located anteriorly. The cervix is closed measuring 3.35 cm in length. Fetal anatomic survey was not performed. BPD measures 2.74 cm. Head circumference measures 10.58 cm. Abdominal circumference measures 9.17 cm. Femur length measures 1.75 cm. Estimated fetal weight is 118.6 grams which correlates to 35th percentile based upon last menstrual period. Estimated gestational age by current ultrasound is 15 weeks, 1 day.  Fetus B: Fetal heart rate measures 132 beats per minute. There is normal fetal body and limb movement. The fetus is in breech position. The placenta is located posteriorly. The cervix is closed measuring 3.35 cm in length. Fetal anatomic survey was not performed. BPD measures 2.86 cm. Head circumference measures 11.4 cm. Abdominal circumference measures 9.3 cm. Femur length measures 1.7 cm. Estimated fetal weight is 119.82 grams which correlates to 38th percentile based upon last menstrual period. Estimated gestational age by current ultrasound is 15 weeks, 2 days. Twin live intrauterine pregnancies with gestational ages of 14 weeks, 1 day and 15 weeks, 2 days as detailed above. Estimated date of delivery is 10/24/2021 or 10/25/2021. Us Pelvis Limited    Result Date: 5/5/2021  EXAMINATION: Bladder ultrasound 5/5/2021 TECHNIQUE: Ultrasound the bladder was performed COMPARISON: None HISTORY: ORDERING SYSTEM PROVIDED HISTORY: Right sided abdominal pain in pregnancy, hematuria, eval for stone TECHNOLOGIST PROVIDED HISTORY: Reason for exam:->Right sided abdominal pain in pregnancy, hematuria, eval for stone Reason for Exam: Rt flank pain, hematuria Acuity: Acute Type of Exam: Initial FINDINGS: Measurements: Prevoid bladder volume is 68 mL Ultrasound Findings: The bladder is not well distended which makes ultrasound graphic evaluation limited. There appears to be a stone at the right ureterovesical junction measuring 7 mm. No ureteral jets were visualized on the right. 7 mm stone in the region of the right ureterovesical junction and no right ureteral jet identified suspicious for distal right ureteral calculus. Otherwise limited evaluation of the bladder due to nondistention. Xr Chest Portable    Result Date: 5/4/2021  EXAMINATION: ONE XRAY VIEW OF THE CHEST 5/4/2021 3:15 pm COMPARISON: None.  HISTORY: ORDERING SYSTEM PROVIDED HISTORY: cough TECHNOLOGIST PROVIDED HISTORY: Reason for exam:->cough Reason for Exam: cough Acuity: Unknown Type of Exam: Unknown Relevant Medical/Surgical History: asthma FINDINGS: The lungs are clear. The mediastinum and cardiac silhouette are unremarkable. No acute abnormality. Us Abdomen Limited    Result Date: 5/5/2021  EXAMINATION: RIGHT UPPER QUADRANT ULTRASOUND 5/5/2021 7:13 am COMPARISON: None. HISTORY: ORDERING SYSTEM PROVIDED HISTORY: RUQ pain TECHNOLOGIST PROVIDED HISTORY: Reason for exam:->RUQ pain Reason for Exam: RUQ / Rt flank pain, hematuria Acuity: Acute Type of Exam: Initial FINDINGS: LIVER:  The liver demonstrates normal echogenicity without evidence of intrahepatic biliary ductal dilatation. BILIARY SYSTEM:  Gallbladder is unremarkable without evidence of pericholecystic fluid, wall thickening or stones. Negative sonographic Melendez's sign. Common bile duct is within normal limits measuring 5 mm. RIGHT KIDNEY: Mild right hydronephrosis is identified. There is a calculus within the urinary bladder. PANCREAS:  The pancreas is suboptimally visualized. OTHER: No evidence of right upper quadrant ascites. Mild right hydronephrosis. Small calculus within the urinary bladder No evidence of cholelithiasis     Assessment & Plan:      Jose Hernandez is a 31y.o. year old female admitted 5/5/2021 for kidney stone. 1) Right Ureteral Calculus   US Pelvis 5/5/21: 7 mm stone in the region of the right ureterovesical junction and no right ureteral jet identified suspicious for distal right ureteral calculus   UA abd 5/5/21: Mild right hydronephrosis. Cr 0.5   UA dip with mod blood, many yeast, rare bacteria, 20 squamous cells; likely contaminated   Repeat UA; may require straight catheterization   Covid negative   NPO since midnight   Plan for cystoscopy, right RGPG, possible ureteroscopy/stone manipulation, possible right ureteral stent placement this afternooon. Will need fetal heart tones before and after procedure per ob/gyn recs. Patient seen and examined, chart reviewed.

## 2021-05-05 NOTE — H&P
[facility] History & Physical      PCP: MARGARITO Duncan - [facility]    Date of Admission: 2021    of Service: Pt seen/examined on 21 and Admitted to Observation    Hx taken from patient    Chief Complaint: Right upper quadrant abdominal pain  History Of Present Illness: The patient is a 31 y.o. female PMHx  asthma, depression  Patient is G4, P2 currently 15-weeks pregnant. Patient states that around 4 AM she started having right upper quadrant abdominal pain. Pain felt like it was behind her ribs. She also felt it on the right side of her back at the same time. Marco did not spread anywhere. It felt like a stabbing and burning sensation. It was constant. She has never had pain like this before. Standing up helped a little bit. Sitting made it worse. No fevers or chills. She did have fatigue. She has had nausea. No vomiting. No burning with urination. No hematuria. She has had no abdominal surgeries. She has had 2 C-sections on her previous pregnancies. Patient states that she has been doing with bronchitis for the last 3 weeks and has been coughing up phlegm and had taken amoxicillin and was recently put on Z-Donald, but is only taken 1 dose of the Z-Donald. .  She is a smoker. She also has asthma and takes inhaler. Patient had Covid in February and feels that it still affects her mental status. In the ER she had a right upper quadrant ultrasound which showed a small calculus within the urinary bladder and mild right hydronephrosis. Pelvic ultrasound showed a 7 mm stone in the region of the right ureterovesical junction. Patient was taken for cystoscopy by urology but no stone was found. Patient states that she did feel like she passed a small stone in the ER.     Past Medical History:        Diagnosis Date    Anxiety disorder     Asthma     Depression        PastSurgical History:        Procedure Laterality Date     SECTION       SECTION N/A 2020     SECTION performed by Jenni Borrego MD at [facility] L&D OR       Medications Prior to Admission:    Prior to Admission medications    Medication Sig Start Date End Date Taking? Authorizing Provider   albuterol sulfate HFA (PROVENTIL HFA) 108 (90 Base) MCG/ACT inhaler Inhale 2 puffs into the lungs every 4 hours as needed for Wheezing or Shortness of Breath With spacer (and mask if indicated). Thanks. 5/4/21 6/3/21  Chuy Lewis DO   guaiFENesin (ROBITUSSIN) 200 MG/10ML SOLN oral solution Take 10 mLs by mouth every 4 hours as needed for Cough 21   Chuy Lewis DO   labetalol (NORMODYNE) 100 MG tablet Take 1 tablet by mouth every 12 hours 2/3/20   Jenni Borrego MD   ibuprofen (ADVIL;MOTRIN) 800 MG tablet Take 1 tablet by mouth every 8 hours 20   Dale Segovia MD   FLUoxetine (PROZAC) 40 MG capsule Take 1 capsule by mouth daily 20   Dale Segovia MD   albuterol sulfate  (90 Base) MCG/ACT inhaler INHALE 2 PUFFS PO EVERY 4-6 HOURS PRN 12/15/19   Historical Provider, MD   Prenatal MV-Min-Fe Fum-FA-DHA (PRENATAL 1) 30-0.975-200 MG CAPS Take 1 tablet by mouth daily 19   Jeff Donato MD       Allergies:    Jose Mitchell and Silver    Social History:    The patient currently lives at home  TOBACCO:   reports that she has been smoking cigarettes. She has been smoking about 1.00 pack per day. She has never used smokeless tobacco.  ETOH:   reports current alcohol use. History:  Positive as follows:      Problem Relation Age of Onset    Cancer Other     Diabetes Other        REVIEW OF SYSTEMS:   Pertinent positives and negatives as noted in the HPI and ROS. All other systems reviewed and negative. Review of Systems   Constitutional: Positive for diaphoresis and fatigue. Negative for chills and fever. HENT: Positive for congestion and sneezing. Negative for rhinorrhea and sore throat. Eyes: Negative for discharge and itching.    Respiratory: Positive for cough (bronchitis). Negative for shortness of breath and wheezing. Cardiovascular: Negative for chest pain, palpitations and leg swelling. Gastrointestinal: Positive for abdominal pain and nausea. Negative for blood in stool, diarrhea and vomiting. Genitourinary: Negative for difficulty urinating, dysuria and hematuria. Musculoskeletal: Negative for arthralgias and myalgias. Skin: Negative for color change and rash. Neurological: Positive for light-headedness. Negative for dizziness and syncope. Psychiatric/Behavioral: Positive for confusion. Negative for hallucinations. PHYSICAL EXAM:  BP (!) 140/79   Pulse 92   Temp 98.8 °F (37.1 °C) (Oral)   Resp 28   Ht 5' 3\" (1.6 m)   Wt 225 lb (102.1 kg)   LMP 06/07/2020   SpO2 99%   BMI 39.86 kg/m²     Physical Exam  Constitutional:       General: She is not in acute distress. Appearance: Normal appearance. She is not ill-appearing, toxic-appearing or diaphoretic. HENT:      Head: Normocephalic and atraumatic. Nose: No rhinorrhea. Mouth/Throat:      Mouth: Mucous membranes are moist.   Eyes:      General:         Right eye: No discharge. Left eye: No discharge. Conjunctiva/sclera: Conjunctivae normal.   Neck:      Musculoskeletal: Normal range of motion and neck supple. No neck rigidity or muscular tenderness. Cardiovascular:      Rate and Rhythm: Normal rate and regular rhythm. Pulses: Normal pulses. Heart sounds: Normal heart sounds. No murmur. No friction rub. No gallop. Pulmonary:      Effort: Pulmonary effort is normal. No respiratory distress. Breath sounds: Normal breath sounds. No stridor. No wheezing, rhonchi or rales. Abdominal:      General: There is no distension. Palpations: Abdomen is soft. Tenderness: There is no abdominal tenderness. There is no guarding or rebound. Comments: Does not have significant tenderness to deep palpation in the right upper quadrant. body and limb movement. The fetus is in breech position. The placenta is located posteriorly. The cervix is closed measuring 3.35 cm in length. Fetal anatomic survey was not performed. BPD measures 2.86 cm. Head circumference measures 11.4 cm. Abdominal circumference measures 9.3 cm. Femur length measures 1.7 cm. Estimated fetal weight is 119.82 grams which correlates to 38th percentile based upon last menstrual period. Estimated gestational age by current ultrasound is 15 weeks, 2 days. Twin live intrauterine pregnancies with gestational ages of 14 weeks, 1 day and 15 weeks, 2 days as detailed above. Estimated date of delivery is 10/24/2021 or 10/25/2021. Us Ob Greater Than 14 Weeks Additional Fetus    Result Date: 5/4/2021  EXAMINATION: TRANSABDOMINAL AND TRANSVAGINAL FIRST TRIMESTER OBSTETRIC PELVIC ULTRASOUND WITH COLOR DOPPLER FLOW; TRANSABDOMINAL SECOND/THIRD TRIMESTER OBSTETRIC PELVIC ULTRASOUND WITH COLOR DOPPLER FLOW 5/4/2021 TECHNIQUE: TRANSABDOMINAL AND TRANSVAGINAL PELVIC ULTRASOUND WITH COLOR DOPPLER FLOW; TRANSABDOMINAL PELVIC ULTRASOUND WITH COLOR DOPPLER FLOW HISTORY: ORDERING SYSTEM PROVIDED HISTORY: fatigue/pregnant TECHNOLOGIST PROVIDED HISTORY: Reason for exam:->fatigue/pregnant Reason for Exam: fatigue; SOB; Covid + in Feb Acuity: Acute Type of Exam: Initial FINDINGS: Twin live intrauterine pregnancies are present. Fetus A: Fetal heart rate measures 144 beats per minute. There is normal fetal body and limb movement. The fetus is in cephalic position. The placenta is located anteriorly. The cervix is closed measuring 3.35 cm in length. Fetal anatomic survey was not performed. BPD measures 2.74 cm. Head circumference measures 10.58 cm. Abdominal circumference measures 9.17 cm. Femur length measures 1.75 cm. Estimated fetal weight is 118.6 grams which correlates to 35th percentile based upon last menstrual period. Estimated gestational age by current ultrasound is 15 weeks, 1 day.  Fetus B: Fetal heart rate measures 132 beats per minute. There is normal fetal body and limb movement. The fetus is in breech position. The placenta is located posteriorly. The cervix is closed measuring 3.35 cm in length. Fetal anatomic survey was not performed. BPD measures 2.86 cm. Head circumference measures 11.4 cm. Abdominal circumference measures 9.3 cm. Femur length measures 1.7 cm. Estimated fetal weight is 119.82 grams which correlates to 38th percentile based upon last menstrual period. Estimated gestational age by current ultrasound is 15 weeks, 2 days. Twin live intrauterine pregnancies with gestational ages of 14 weeks, 1 day and 15 weeks, 2 days as detailed above. Estimated date of delivery is 10/24/2021 or 10/25/2021. Us Pelvis Limited    Result Date: 5/5/2021  EXAMINATION: Bladder ultrasound 5/5/2021 TECHNIQUE: Ultrasound the bladder was performed COMPARISON: None HISTORY: ORDERING SYSTEM PROVIDED HISTORY: Right sided abdominal pain in pregnancy, hematuria, eval for stone TECHNOLOGIST PROVIDED HISTORY: Reason for exam:->Right sided abdominal pain in pregnancy, hematuria, eval for stone Reason for Exam: Rt flank pain, hematuria Acuity: Acute Type of Exam: Initial FINDINGS: Measurements: Prevoid bladder volume is 68 mL Ultrasound Findings: The bladder is not well distended which makes ultrasound graphic evaluation limited. There appears to be a stone at the right ureterovesical junction measuring 7 mm. No ureteral jets were visualized on the right. 7 mm stone in the region of the right ureterovesical junction and no right ureteral jet identified suspicious for distal right ureteral calculus. Otherwise limited evaluation of the bladder due to nondistention. Xr Chest Portable    Result Date: 5/4/2021  EXAMINATION: ONE XRAY VIEW OF THE CHEST 5/4/2021 3:15 pm COMPARISON: None.  HISTORY: ORDERING SYSTEM PROVIDED HISTORY: cough TECHNOLOGIST PROVIDED HISTORY: Reason for exam:->cough Reason for Exam: SPECGRAV 1.014 05/05/2021    LEUKOCYTESUR NEGATIVE 05/05/2021    BLOODU MODERATE 05/05/2021    GLUCOSEU neg 02/05/2014    AMORPHOUS OCCASIONAL 05/04/2021       ABG  No results found for: DVV3NCT, BEART, V2MXWNRM, PHART, THGBART, URN8RPG, PO2ART, SEM1PNW        Active Hospital Problems    Diagnosis Date Noted    Kidney stone [N20.0] 28/28/0318       CERTIFICATION    I certify that Félix Ocasio is expected to be hospitalized for <2 midnights based on the following assessment and plan:    ASSESSMENT/PLAN:    1. Right upper quadrant pain possibly due to 7 mm stone with right hydronephrosis  -seen on pelvic ultrasound and right upper quadrant ultrasound. No gallbladder pathology. Patient may have passed stone. Urology did not see any stone during cystoscopy. Placed on IV fluids. As needed pain control. If pain returns then may need to get MRI of the abdomen and possible general surgery consult for evaluation. 2. 15-week pregnancy  -patient has twins. Consult OB/GYN. 3. Bronchitis  -patient states she is on antibiotics for bronchitis. Will monitor. Give albuterol as needed. We will hold off on antibiotics at this time. Check procalcitonin and sputum culture. 4. Anxiety/depression  -patient states she is not taking any medication for few months. Chronic Illnesses  COVID-19 infection in February-patient still feels residual symptoms, affecting her thought process.     DVT Prophylaxis: SCD due to need for procedure, ambulatory and young  Diet: Diet NPO Effective Now  Code Status: Full  POA: Cynthia Minerva - Mother       Alfredito Padilla MD

## 2021-05-05 NOTE — PROGRESS NOTES
1405 - transferred from OR on bed, monitor applied, alarms on and verified, bedside handoff provided by [employer] RN, Bre Rey, and Sol Costa CRNA  3845 - maternity nurse present at bedside to check for fetal heart tones  1502 - patient medicated for complaint of pain  1506 - Dr Leilani Roque at bedside evaluating patient, orders received and processed.   1535 - phase one care complete; patient transferred to MB room 13

## 2021-05-05 NOTE — ED TRIAGE NOTES
Patient arrived in the ED via ES with right upper quadrant continuous abdominal pain that she ranges a 10 out 10. Patient is 15 weeks pregnant with twins and OB doctor is Dr. Jc Tay.  Patient stated that pain started at 4am this AM.

## 2021-05-06 VITALS
WEIGHT: 225 LBS | RESPIRATION RATE: 18 BRPM | BODY MASS INDEX: 39.87 KG/M2 | HEART RATE: 74 BPM | HEIGHT: 63 IN | OXYGEN SATURATION: 100 % | SYSTOLIC BLOOD PRESSURE: 135 MMHG | DIASTOLIC BLOOD PRESSURE: 80 MMHG | TEMPERATURE: 98.3 F

## 2021-05-06 LAB
ALBUMIN SERPL-MCNC: 3.3 GM/DL (ref 3.4–5)
ALP BLD-CCNC: 62 IU/L (ref 40–128)
ALT SERPL-CCNC: <5 U/L (ref 10–40)
ANION GAP SERPL CALCULATED.3IONS-SCNC: 9 MMOL/L (ref 4–16)
AST SERPL-CCNC: 8 IU/L (ref 15–37)
BASOPHILS ABSOLUTE: 0.1 K/CU MM
BASOPHILS RELATIVE PERCENT: 0.6 % (ref 0–1)
BILIRUB SERPL-MCNC: 0.2 MG/DL (ref 0–1)
BUN BLDV-MCNC: 5 MG/DL (ref 6–23)
CALCIUM SERPL-MCNC: 8.6 MG/DL (ref 8.3–10.6)
CHLORIDE BLD-SCNC: 104 MMOL/L (ref 99–110)
CO2: 25 MMOL/L (ref 21–32)
CREAT SERPL-MCNC: 0.6 MG/DL (ref 0.6–1.1)
CULTURE: NORMAL
DIFFERENTIAL TYPE: ABNORMAL
EOSINOPHILS ABSOLUTE: 0.3 K/CU MM
EOSINOPHILS RELATIVE PERCENT: 2.7 % (ref 0–3)
GFR AFRICAN AMERICAN: >60 ML/MIN/1.73M2
GFR NON-AFRICAN AMERICAN: >60 ML/MIN/1.73M2
GLUCOSE BLD-MCNC: 80 MG/DL (ref 70–99)
HCT VFR BLD CALC: 34 % (ref 37–47)
HEMOGLOBIN: 10.9 GM/DL (ref 12.5–16)
IMMATURE NEUTROPHIL %: 0.5 % (ref 0–0.43)
LYMPHOCYTES ABSOLUTE: 2 K/CU MM
LYMPHOCYTES RELATIVE PERCENT: 20.8 % (ref 24–44)
Lab: NORMAL
MCH RBC QN AUTO: 28 PG (ref 27–31)
MCHC RBC AUTO-ENTMCNC: 32.1 % (ref 32–36)
MCV RBC AUTO: 87.4 FL (ref 78–100)
MONOCYTES ABSOLUTE: 0.8 K/CU MM
MONOCYTES RELATIVE PERCENT: 7.8 % (ref 0–4)
NUCLEATED RBC %: 0 %
PDW BLD-RTO: 15.6 % (ref 11.7–14.9)
PLATELET # BLD: 318 K/CU MM (ref 140–440)
PMV BLD AUTO: 8.8 FL (ref 7.5–11.1)
POTASSIUM SERPL-SCNC: 3.9 MMOL/L (ref 3.5–5.1)
RBC # BLD: 3.89 M/CU MM (ref 4.2–5.4)
SEGMENTED NEUTROPHILS ABSOLUTE COUNT: 6.5 K/CU MM
SEGMENTED NEUTROPHILS RELATIVE PERCENT: 67.6 % (ref 36–66)
SODIUM BLD-SCNC: 138 MMOL/L (ref 135–145)
SPECIMEN: NORMAL
TOTAL IMMATURE NEUTOROPHIL: 0.05 K/CU MM
TOTAL NUCLEATED RBC: 0 K/CU MM
TOTAL PROTEIN: 5.9 GM/DL (ref 6.4–8.2)
WBC # BLD: 9.7 K/CU MM (ref 4–10.5)

## 2021-05-06 PROCEDURE — 6360000002 HC RX W HCPCS: Performed by: NURSE PRACTITIONER

## 2021-05-06 PROCEDURE — 94640 AIRWAY INHALATION TREATMENT: CPT

## 2021-05-06 PROCEDURE — 6370000000 HC RX 637 (ALT 250 FOR IP): Performed by: UROLOGY

## 2021-05-06 PROCEDURE — 80053 COMPREHEN METABOLIC PANEL: CPT

## 2021-05-06 PROCEDURE — 85025 COMPLETE CBC W/AUTO DIFF WBC: CPT

## 2021-05-06 PROCEDURE — 2580000003 HC RX 258: Performed by: UROLOGY

## 2021-05-06 PROCEDURE — G0378 HOSPITAL OBSERVATION PER HR: HCPCS

## 2021-05-06 PROCEDURE — 6370000000 HC RX 637 (ALT 250 FOR IP): Performed by: OBSTETRICS & GYNECOLOGY

## 2021-05-06 PROCEDURE — 6360000002 HC RX W HCPCS: Performed by: UROLOGY

## 2021-05-06 PROCEDURE — 36415 COLL VENOUS BLD VENIPUNCTURE: CPT

## 2021-05-06 PROCEDURE — 96375 TX/PRO/DX INJ NEW DRUG ADDON: CPT

## 2021-05-06 RX ORDER — HYDROXYZINE PAMOATE 25 MG/1
25 CAPSULE ORAL 4 TIMES DAILY PRN
Qty: 20 CAPSULE | Refills: 0 | Status: SHIPPED | OUTPATIENT
Start: 2021-05-06 | End: 2021-05-06

## 2021-05-06 RX ORDER — ESCITALOPRAM OXALATE 10 MG/1
10 TABLET ORAL DAILY
Qty: 30 TABLET | Refills: 0 | Status: SHIPPED | OUTPATIENT
Start: 2021-05-07 | End: 2021-05-06

## 2021-05-06 RX ORDER — ESCITALOPRAM OXALATE 10 MG/1
10 TABLET ORAL DAILY
Qty: 30 TABLET | Refills: 0 | Status: SHIPPED | OUTPATIENT
Start: 2021-05-07 | End: 2021-08-03 | Stop reason: DRUGHIGH

## 2021-05-06 RX ORDER — HYDROXYZINE PAMOATE 25 MG/1
25 CAPSULE ORAL 4 TIMES DAILY PRN
Qty: 20 CAPSULE | Refills: 0 | Status: SHIPPED | OUTPATIENT
Start: 2021-05-06

## 2021-05-06 RX ORDER — METRONIDAZOLE 500 MG/1
500 TABLET ORAL EVERY 12 HOURS SCHEDULED
Qty: 26 TABLET | Refills: 0 | Status: SHIPPED | OUTPATIENT
Start: 2021-05-06 | End: 2021-05-06

## 2021-05-06 RX ORDER — METRONIDAZOLE 500 MG/1
500 TABLET ORAL EVERY 12 HOURS SCHEDULED
Qty: 26 TABLET | Refills: 0 | Status: SHIPPED | OUTPATIENT
Start: 2021-05-06 | End: 2021-05-19

## 2021-05-06 RX ADMIN — ONDANSETRON 4 MG: 2 INJECTION INTRAMUSCULAR; INTRAVENOUS at 08:31

## 2021-05-06 RX ADMIN — CEFAZOLIN SODIUM 2000 MG: 10 INJECTION, POWDER, FOR SOLUTION INTRAVENOUS at 10:13

## 2021-05-06 RX ADMIN — SODIUM CHLORIDE, PRESERVATIVE FREE 10 ML: 5 INJECTION INTRAVENOUS at 08:31

## 2021-05-06 RX ADMIN — HYDROXYZINE PAMOATE 25 MG: 25 CAPSULE ORAL at 00:18

## 2021-05-06 RX ADMIN — ALBUTEROL SULFATE 2 PUFF: 90 AEROSOL, METERED RESPIRATORY (INHALATION) at 01:01

## 2021-05-06 RX ADMIN — HYDROCODONE BITARTRATE AND ACETAMINOPHEN 1 TABLET: 5; 325 TABLET ORAL at 03:38

## 2021-05-06 RX ADMIN — ESCITALOPRAM OXALATE 10 MG: 10 TABLET ORAL at 10:12

## 2021-05-06 RX ADMIN — METRONIDAZOLE 500 MG: 250 TABLET ORAL at 08:57

## 2021-05-06 RX ADMIN — CEFAZOLIN SODIUM 2000 MG: 10 INJECTION, POWDER, FOR SOLUTION INTRAVENOUS at 01:26

## 2021-05-06 RX ADMIN — ACETAMINOPHEN 650 MG: 325 TABLET ORAL at 08:43

## 2021-05-06 ASSESSMENT — PAIN SCALES - GENERAL
PAINLEVEL_OUTOF10: 2
PAINLEVEL_OUTOF10: 3
PAINLEVEL_OUTOF10: 0
PAINLEVEL_OUTOF10: 7
PAINLEVEL_OUTOF10: 0

## 2021-05-06 ASSESSMENT — PAIN DESCRIPTION - PAIN TYPE: TYPE: ACUTE PAIN

## 2021-05-06 ASSESSMENT — PAIN DESCRIPTION - ORIENTATION: ORIENTATION: RIGHT;MID;LOWER

## 2021-05-06 ASSESSMENT — PAIN DESCRIPTION - DESCRIPTORS: DESCRIPTORS: DISCOMFORT;ACHING

## 2021-05-06 ASSESSMENT — PAIN DESCRIPTION - LOCATION: LOCATION: ABDOMEN

## 2021-05-06 ASSESSMENT — PAIN DESCRIPTION - PROGRESSION: CLINICAL_PROGRESSION: GRADUALLY IMPROVING

## 2021-05-06 ASSESSMENT — PAIN DESCRIPTION - ONSET: ONSET: ON-GOING

## 2021-05-06 ASSESSMENT — PAIN DESCRIPTION - FREQUENCY: FREQUENCY: CONTINUOUS

## 2021-05-06 ASSESSMENT — PAIN - FUNCTIONAL ASSESSMENT: PAIN_FUNCTIONAL_ASSESSMENT: ACTIVITIES ARE NOT PREVENTED

## 2021-05-06 NOTE — PLAN OF CARE
Problem: Pain:  Goal: Pain level will decrease  Description: Pain level will decrease  5/6/2021 0549 by Jalen French RN  Outcome: Ongoing  5/5/2021 1651 by Lisbeth Hernandez RN  Outcome: Ongoing  Goal: Control of acute pain  Description: Control of acute pain  5/6/2021 0549 by Jalen French RN  Outcome: Ongoing  5/5/2021 1651 by Lisbeth Hernandez RN  Outcome: Ongoing  Goal: Control of chronic pain  Description: Control of chronic pain  5/6/2021 0549 by Jalen Fernch RN  Outcome: Ongoing  5/5/2021 1651 by Lisbeth Hernandez RN  Outcome: Ongoing     Problem: Discharge Planning:  Goal: Discharged to appropriate level of care  Description: Discharged to appropriate level of care  5/6/2021 0549 by Jalen French RN  Outcome: Ongoing  5/5/2021 1651 by Lisbeth Hernandez RN  Outcome: Ongoing     Problem: Falls - Risk of:  Goal: Will remain free from falls  Description: Will remain free from falls  5/6/2021 0549 by Jalen French RN  Outcome: Ongoing  5/5/2021 1651 by Lisbeth Hernandez RN  Outcome: Ongoing  Goal: Absence of physical injury  Description: Absence of physical injury  5/6/2021 0549 by Jalen French RN  Outcome: Ongoing  5/5/2021 1651 by Lisbeth Hernandez RN  Outcome: Ongoing

## 2021-05-06 NOTE — FLOWSHEET NOTE
Fetal Heart tones completed per Pablo Harris RN using ultrasound. Baby A has heart rate 150's and Baby B has 's. No complaints voiced at present time.

## 2021-05-06 NOTE — OP NOTE
[address] [place] [address], [address] [place] [address]                                OPERATIVE REPORT    PATIENT NAME: Colletta Cooley                   :        1995  MED REC NO:   [id number]                          ROOM:       [facility]  ACCOUNT NO:   [de-identified]                           ADMIT DATE: 2021  PROVIDER:     Patito Doan MD    DATE OF PROCEDURE:  2021    PREOPERATIVE DIAGNOSES:  1. Acute right flank pain. 2.  Right hydronephrosis. 3.  Possible distal right ureter stone. POSTOPERATIVE DIAGNOSIS:  Acute right flank pain. PROCEDURE PERFORMED:  Diagnostic right ureteroscopy. SURGEON:  Patito Doan MD    ANESTHESIA:  Spinal.    ESTIMATED BLOOD LOSS:  Minimal.    COMPLICATIONS:  None. DRAINS PLACED:  None. SPECIMEN:  None. FINDINGS:  1.  No stone found in bladder. No concerning masses or lesions in  bladder. 2.  No stone in right ureter from UVJ to right UPJ on diagnostic right  ureteroscopy. 3.  Efflux of urine from right UO seen after ureteroscopy, therefore  stent was not placed. 4.  Lead aprons placed on patient posterior and anterior. Aprons were  from above the umbilicus to below the pubic symphysis. 5.  Less than 1 second fluoroscopy was used to confirm wire in right  kidney with lead aprons in place. DISPOSITION:  Stable to PACU. INDICATIONS FOR PROCEDURE:  The patient is a 53-year-old female who is  14 weeks' pregnant with twins, who developed acute right flank pain with  nausea and vomiting. The pain was severe. She was seen and evaluated  in the emergency room. A renal ultrasound suggested mild right  hydronephrosis. A pelvic ultrasound suggested a distal right ureter  stone without ureter jets. The stone was estimated to be 7 mm. The  patient denied any UTI symptoms.   I had a long discussion with the  patient and the patient's father about the risks of diagnosing and  treating stones in pregnancy. We had a long discussion about the risks  of infection, bleeding, loss of pregnancy,  labor, birth defects,  risks of radiation, and possible loss of pregnancy. The patient elected  to proceed. She was given a dose of Ancef. OPERATIVE REPORT:  The patient was on Ancef. She was brought to the  operating room, placed on the operating room table. A spinal anesthetic  was administered by the Anesthesia Service. She was then placed in a  dorsal lithotomy position with lead aprons posteriorly and anteriorly as  above. The aprons were placed above the umbilicus to below the pubic  symphysis. The patient was prepped and draped in a sterile fashion. A  time-out was performed per protocol. A 21-American cystoscope with  30-degree lens was placed through the urethra and into the bladder  without difficulty. The bladder was irrigated several times. There was  no concerning masses or lesions found in the bladder. There is no  stone. A sensor guidewire was placed in the right collecting system. Fluoroscopy was used to confirm the wire was then the right kidney only. Less than 1 second fluoroscopy was used. I then performed diagnostic  right ureteroscopy with a semi-rigid ureteroscope over a second sensor  guidewire. This was placed without difficulty to the right UPJ. No  stone was found. There was a caliber change in the ureter approximately  1 cm above the UVJ. No stone was found in the right ureter. The  ureteroscope and sensor guidewires were both removed. The right  collecting system was observed. The right UO could be seen effluxing  urine, therefore no obstruction was suspected. I did not perform a  retrograde pyelogram in attempts to reduce the use of radiation. There  is minimal blood loss during this procedure. The bladder was emptied  and the patient was brought to the PACU in stable condition.   Fetal  heart tones were obtained before the surgery and will obtain fetal heart  tones again in the PACU.         Dwain Jacobs MD    D: 05/05/2021 14:11:30       T: 05/05/2021 14:19:06     [id number]  Job#: [id number]     Doc#: [id number]    CC:  Brandy Lynn MD

## 2021-05-06 NOTE — CONSULTS
Department of Gynecology  Consult Note      Reason for Consult:  Suspected nephrolithiasis in pregnancy    Requesting Physician:  Yadira Earl MD    CHIEF COMPLAINT:   Right upper quadrant pain    History obtained from patient    HISTORY OF PRESENT ILLNESS:     The patient is a 31 y.o. female with dichorionic/ diamniotic twin pregnancy and 15w3d who began having upper respiratory complaints of cough and fatigue for the last couple of days. Slight green sputum, no shortness of breath, no fever chills. She was given azithromycin and taking OTC robitussin. This AM she woke up with acute right upper quadrant pain, 10/10, \"worse than labor\"    She had an ultrasound which showed a ight UVJ stone and subsequently underwent cystoscopy with ureteroscopy which revealed no stone. She does report possibly passing a very small stone.      Past Medical History:        Diagnosis Date    Anxiety disorder     Asthma     Depression      Past Surgical History:        Procedure Laterality Date     SECTION       SECTION N/A 2020     SECTION performed by Jenni Borrego MD at [facility] L&D OR     meds:  Current Facility-Administered Medications:     sodium chloride flush 0.9 % injection 5-40 mL, 5-40 mL, Intravenous, PRN, Halie Wright MD, 10 mL at 21    0.9 % sodium chloride infusion, 25 mL, Intravenous, PRN, Halie Wright MD    acetaminophen (TYLENOL) tablet 650 mg, 650 mg, Oral, Q4H PRN, Halie Wright MD    lactated ringers infusion, , Intravenous, Continuous, Halie Wright MD    HYDROcodone-acetaminophen [facility]) 5-325 MG per tablet 1 tablet, 1 tablet, Oral, Q4H PRN, Halie Wright MD, 1 tablet at 21 1844    ceFAZolin (ANCEF) 2000 mg in dextrose 5 % 100 mL IVPB, 2,000 mg, Intravenous, Q8H, Halie Wright MD, Stopped at 21 1900    albuterol sulfate  (90 Base) MCG/ACT inhaler 2 puff, 2 puff, Inhalation, Q6H PRN, MD Lilli Daniels ondansetron (ZOFRAN) injection 4 mg, 4 mg, Intravenous, Q6H PRN, MARGARITO Fay - CNP, 4 mg at 05/05/21 2043    terconazole (TERAZOL 3) 0.8 % vaginal cream 1 applicator, 1 applicator, Vaginal, Nightly, MARGARITO Fay CNP       Allergies:  Gold and Silver     Social History:  TOBACCO:   reports that she has been smoking cigarettes. She has been smoking about 1.00 pack per day. She has never used smokeless tobacco.  ETOH:   reports current alcohol use. DRUGS:   reports no history of drug use.     Family History:       Problem Relation Age of Onset    Cancer Other     Diabetes Other         PHYSICAL EXAM:    Vitals:  /72   Pulse 87   Temp 97.9 °F (36.6 °C) (Oral)   Resp 16   Ht 5' 3\" (1.6 m)   Wt 225 lb (102.1 kg)   LMP 06/07/2020   SpO2 100%   BMI 39.86 kg/m²     CONSTITUTIONAL:  awake, alert, cooperative, no apparent distress, and appears stated age  NECK:  supple, symmetrical, trachea midline  BACK:  Symmetric, no curvature, spinous processes are non-tender on palpation, paraspinous muscles are non-tender on palpation, no costal vertebral tenderness  LUNGS:  no increased work of breathing  CARDIOVASCULAR:  regular rate and rhythm  ABDOMEN:  Soft, uterus palpable at u-1, mild right periumbilical tenderness, negative rovsings, negative homans, no peritoneal signs  SKIN:  no bruising or bleeding      DATA:  Labs:    05/05/2021 0946 05/05/2021 1015 Urinalysis [[id number]]   (Abnormal)   Urine, clean catch    Component Value Units   Color, UA STRAWAbnormal     Clarity, UA CLEAR    Glucose, Urine NEGATIVE MG/DL   Bilirubin Urine NEGATIVE MG/DL   Ketones, Urine SMALLAbnormal  MG/DL   Specific Gravity, UA 1.010    Blood, Urine MODERATEAbnormal     pH, Urine 7.0    Protein, UA NEGATIVE MG/DL   Urobilinogen, Urine NEGATIVE MG/DL   Nitrite Urine, Quantitative NEGATIVE    Leukocyte Esterase, Urine NEGATIVE    RBC, UA 22High  /HPF   WBC, UA 2 /HPF   Bacteria, UA NEGATIVE /HPF   Squam Epithel, UA 1 /HPF   Mucus, UA RAREAbnormal  HPF   Trichomonas, UA NONE SEEN /HPF           05/05/2021 7682 05/05/2021 0658 Urinalysis [[id number]]   (Abnormal)   Urine, clean catch    Component Value Units   Color, UA YELLOW    Clarity, UA SLIGHTLY CLOUDYAbnormal     Glucose, Urine NEGATIVE MG/DL   Bilirubin Urine NEGATIVE MG/DL   Ketones, Urine NEGATIVE MG/DL   Specific Gravity, UA 1.014    Blood, Urine MODERATEAbnormal     pH, Urine 7.0    Protein, UA NEGATIVE MG/DL   Urobilinogen, Urine NEGATIVE MG/DL   Nitrite Urine, Quantitative NEGATIVE    Leukocyte Esterase, Urine NEGATIVE    RBC, UA 106High  /HPF   WBC, UA 7High  /HPF   Bacteria, UA RAREAbnormal  /HPF   WBC Clumps, UA FEW /HPF   Yeast, UA MANY /HPF   Squam Epithel, UA 20 /HPF   Trichomonas, UA NONE SEEN /HPF   Unclassified Crystal FEW /HPF           05/05/2021 0630 05/05/2021 0708 Culture, Urine [[id number]]   Urine voided    Component Value   No component results           05/05/2021 0546 05/05/2021 0622 Lipase [[id number]]   Blood    Component Value Units   Lipase 17 IU/L           05/05/2021 0546 05/05/2021 0622 Comprehensive Metabolic Panel w/ Reflex to MG [[id number]]   (Abnormal)   Blood    Component Value Units   Sodium 134Low  MMOL/L   Potassium 3.7 MMOL/L   Chloride 104 mMol/L   CO2 22 MMOL/L   BUN 5Low  MG/DL   CREATININE 0.5Low  MG/DL   Glucose 100High  MG/DL   Calcium 9.2 MG/DL   Albumin 3.4 GM/DL   Total Protein 6.8 GM/DL   Total Bilirubin 0.1 MG/DL   ALT 6Low  U/L   AST 10Low  IU/L   Alkaline Phosphatase 69 IU/L   GFR Non-African American >60 mL/min/1.73m2   GFR African American >60 mL/min/1.73m2   Anion Gap 8            05/05/2021 0546 05/05/2021 0623 CBC Auto Differential [[id number]]   (Abnormal)   Blood    Component Value Units   WBC 11. 1High  K/CU MM   RBC 4.18Low  M/CU MM   Hemoglobin 12.1Low  GM/DL   Hematocrit 35.5Low  %   MCV 84.9 FL   MCH 28.9 PG   MCHC 34.1 %   RDW 15.7High  %   Platelets 409 K/CU MM   MPV 8.9 FL   Differential Type AUTOMATED DIFFERENTIAL    Segs Relative 71. 4High  %   Lymphocytes % 19.9Low  %   Monocytes % 5.5High  %   Eosinophils % 2.3 %   Basophils % 0.3 %   Segs Absolute 7.9 K/CU MM   Lymphocytes Absolute 2.2 K/CU MM   Monocytes Absolute 0.6 K/CU MM   Eosinophils Absolute 0.3 K/CU MM   Basophils Absolute 0.0 K/CU MM   Nucleated RBC % 0.0 %   Total Nucleated RBC 0.0 K/CU MM   Total Immature Neutrophil 0.07 K/CU MM   Immature Neutrophil % 0.6High  %           05/04/2021 1555 05/04/2021 1650 Urinalysis [[id number]]   (Abnormal)   Urine, clean catch    Component Value Units   Color, UA YELLOW    Clarity, UA CLOUDYAbnormal     Glucose, Urine NEGATIVE MG/DL   Bilirubin Urine NEGATIVE MG/DL   Ketones, Urine NEGATIVE MG/DL   Specific Gravity, UA 1.015    Blood, Urine SMALLAbnormal     pH, Urine 7.0    Protein, UA NEGATIVE MG/DL   Urobilinogen, Urine 2.0High  MG/DL   Nitrite Urine, Quantitative NEGATIVE    Leukocyte Esterase, Urine NEGATIVE    RBC, UA 5 /HPF   WBC, UA NONE SEEN /HPF   Bacteria, UA NEGATIVE /HPF   Squam Epithel, UA 14 /HPF   Mucus, UA RAREAbnormal  HPF   Trichomonas, UA NONE SEEN /HPF   Amorphous, UA OCCASIONAL /HPF           05/04/2021 1537 05/04/2021 1612 COVID-19, Rapid [[id number]]   Nasopharyngeal    Component Value   Source THROAT   SARS-CoV-2, NAAT NOT DETECTED            05/04/2021 1525 05/04/2021 1604 Magnesium [[id number]]   Blood    Component Value Units   Magnesium 1.8 mg/dl           05/04/2021 1525 05/04/2021 1620 TSH without Reflex [[id number]]   Blood    Component Value Units   TSH, High Sensitivity 0.550  uIu/ml           05/04/2021 1525 05/04/2021 1604 CMP [[id number]]   (Abnormal)   Blood    Component Value Units   Sodium 135 MMOL/L   Potassium 4.0 MMOL/L   Chloride 104 mMol/L   CO2 23 MMOL/L   BUN 5Low  MG/DL   CREATININE 0.5Low  MG/DL   Glucose 87 MG/DL   Calcium 9.5 MG/DL   Albumin 3.6 GM/DL   Total Protein 6.7 GM/DL   Total Bilirubin 0.1 MG/DL   ALT 6Low  U/L   AST 9Low  IU/L   Alkaline Phosphatase 71 exam:->RUQ pain   Reason for Exam: RUQ / Rt flank pain, hematuria   Acuity: Acute   Type of Exam: Initial       FINDINGS:   LIVER:  The liver demonstrates normal echogenicity without evidence of   intrahepatic biliary ductal dilatation.       BILIARY SYSTEM:  Gallbladder is unremarkable without evidence of   pericholecystic fluid, wall thickening or stones.  Negative sonographic   Melendez's sign.       Common bile duct is within normal limits measuring 5 mm.       RIGHT KIDNEY: Mild right hydronephrosis is identified. Elijah Dukesl is a calculus   within the urinary bladder.       PANCREAS:  The pancreas is suboptimally visualized.       OTHER: No evidence of right upper quadrant ascites.           Impression   Mild right hydronephrosis.  Small calculus within the urinary bladder       No evidence of cholelithiasis     EXAMINATION:   Bladder ultrasound       5/5/2021       TECHNIQUE:   Ultrasound the bladder was performed       COMPARISON:   None       HISTORY:   ORDERING SYSTEM PROVIDED HISTORY: Right sided abdominal pain in pregnancy,   hematuria, eval for stone   TECHNOLOGIST PROVIDED HISTORY:   Reason for exam:->Right sided abdominal pain in pregnancy, hematuria, eval   for stone   Reason for Exam: Rt flank pain, hematuria   Acuity: Acute   Type of Exam: Initial       FINDINGS:       Measurements:       Prevoid bladder volume is 68 mL           Ultrasound Findings:       The bladder is not well distended which makes ultrasound graphic evaluation   limited.  There appears to be a stone at the right ureterovesical junction   measuring 7 mm.  No ureteral jets were visualized on the right.           Impression   7 mm stone in the region of the right ureterovesical junction and no right   ureteral jet identified suspicious for distal right ureteral calculus.       Otherwise limited evaluation of the bladder due to nondistention. IMPRESSION/RECOMMENDATIONS:      Dichorionic/ Diamniotic twins gestation at 15w3d  1. Acute RUQ pain, now on examination is right periumbilical.  UVJ stone on ultrasound not visualized on ureteroscopy            -differential includes ureteral inflammation from a kidney stone that has passed, musculoskeletal strain from coughing from her upper resipiratory symptoms, complicated UTI (3 urinalysis are contaminated and not overly suspicious for infection, urine culture is pending), pain from the mild right hydronephrosis (which may be physiologic at 15 weeks in a twin gestation), and appendicitis (clinical presentation is NOT consistent with classical presentation of appendicitis)          - CBC in AM   -await urine culture   -reevaluate patient if pain persists consider MRI of abdomen and pelvis assess for appendicitis    2. Upper respiratory symptoms of cough and green sputum   -neg covid, normal chest xray, neg influenza A and B   -defer to hospitalist for further evaluation    3. Yeast on urinalysis   -terconazole 0.45 vaginal cream qhs x 7 nights    4. Bacterial vaginosis   -diagnosed via vaginitis panel in the office on 4/15/21 and she did not  meds   -flagyl 500mg 1 po bid #14    5.   Depression and anxiety   -see office notes on 4/21/21, rx for lexapro and vistaril, not yet started   -lexapro 10mg po qd   -vistaril 25mg po q6hr prn anxiety

## 2021-05-06 NOTE — DISCHARGE SUMMARY
Hospital Medicine  DISCHARGE SUMMARY  Date: 5/6/2021  Name: Grayson Adams  MRN: [id number]  YOB: 1995     Patient's PCP: MARGARITO Jerome CNP  Admit Date: 5/5/2021   Discharge Date: 5/6/2021  Admitting Physician: Jolynn Prieto MD  Discharge Physician: Sheba Keenan MD      Discharge Diagnoses:  1. Right upper quadrant pain possibly due to 7 mm stone with right hydronephrosis  2. 15-week pregnancy  3. Bronchitis  4. Anxiety/depression  5. Bacterial Vaginosis  6. Yeast on UA         Chronic Illnesses  COVID-19 infection in February    HPI:    Chief Complaint   Patient presents with    Abdominal Pain     right upper quadrent ( 15weeks pregnant)     Chief Complaint: Right upper quadrant abdominal pain  History Of Present Illness: The patient is a 31 y.o. female PMHx  asthma, depression  Patient is G4, P2 currently 15-weeks pregnant. Patient states that around 4 AM she started having right upper quadrant abdominal pain. Pain felt like it was behind her ribs. She also felt it on the right side of her back at the same time. Marco did not spread anywhere. It felt like a stabbing and burning sensation. It was constant. She has never had pain like this before. Standing up helped a little bit. Sitting made it worse. No fevers or chills. She did have fatigue. She has had nausea. No vomiting. No burning with urination. No hematuria. She has had no abdominal surgeries. She has had 2 C-sections on her previous pregnancies.     Patient states that she has been doing with bronchitis for the last 3 weeks and has been coughing up phlegm and had taken amoxicillin and was recently put on Z-Donald, but is only taken 1 dose of the Z-Donald. .  She is a smoker. She also has asthma and takes inhaler.   Patient had Covid in February and feels that it still affects her mental status.     In the ER she had a right upper quadrant ultrasound which showed a small calculus within the urinary bladder and mild bilaterally, respiratory effort normal  Abdomen: soft, NT/ND  Extremities: no pitting edema, nontender   Neuro: no gross deficits, patient is awake, alert and orientated . Skin: no rashes or ecchymosis    Patient Instructions: Follow-up With  Details  Why  Contact Info   Shannon Murphy MD  Go in 1 month  for a kidney ultrasound and to check on your kidney stones. Urology office will call to set up Marisol Delgado Dr Kala Temple MD  Go to  for follow up  [phone]  [phone]         Medications: see computerized discharge medication list  Activity: as instructed by OB/GYN  Diet:  DIET GENERAL;  Disposition: home  Discharged Condition: Stable       The patient was seen and examined on day of discharge and this discharge summary is in conjunction with any daily progress note from day of discharge. Time spent on discharge is 39 minutes in the examination, evaluation, counseling and review of medications and discharge plan. Discharge Medications:     Medication List      START taking these medications    escitalopram 10 MG tablet  Commonly known as: LEXAPRO  Take 1 tablet by mouth daily  Start taking on: May 7, 2021     hydrOXYzine 25 MG capsule  Commonly known as: VISTARIL  Take 1 capsule by mouth 4 times daily as needed for Anxiety     metroNIDAZOLE 500 MG tablet  Commonly known as: FLAGYL  Take 1 tablet by mouth every 12 hours for 13 days     terconazole 0.4 % vaginal cream  Commonly known as: Terazol 7  Place 1 applicator vaginally nightly for 7 days Place vaginally nightly. CONTINUE taking these medications    * albuterol sulfate  (90 Base) MCG/ACT inhaler     * albuterol sulfate  (90 Base) MCG/ACT inhaler  Commonly known as: Proventil HFA  Inhale 2 puffs into the lungs every 4 hours as needed for Wheezing or Shortness of Breath With spacer (and mask if indicated). Thanks.      guaiFENesin 200 MG/10ML Soln oral solution  Commonly known as: ROBITUSSIN  Take 10 mLs by mouth every 4 hours as needed for Cough     Prenatal 1 30-0.975-200 MG Caps  Take 1 tablet by mouth daily     ZITHROMAX Z-KESHAWN PO         * This list has 2 medication(s) that are the same as other medications prescribed for you. Read the directions carefully, and ask your doctor or other care provider to review them with you. Where to Get Your Medications      You can get these medications from any pharmacy    Bring a paper prescription for each of these medications  · escitalopram 10 MG tablet  · hydrOXYzine 25 MG capsule  · metroNIDAZOLE 500 MG tablet  · terconazole 0.4 % vaginal cream         Consultations  IP CONSULT TO UROLOGY  IP CONSULT TO OB GYN  IP CONSULT TO HOSPITALIST  IP CONSULT TO UROLOGY  IP CONSULT TO OB GYN  IP CONSULT TO UROLOGY    Invasive procedures:    5/5/2021 diagnostic right ureteroscopy    Significant Diagnostic Studies:    Imaging  Us Ob Less Than 14 Weeks Single Or First Gestation  Result Date: 5/4/2021  Twin live intrauterine pregnancies with gestational ages of 14 weeks, 1 day and 14 weeks, 2 days as detailed above. Estimated date of delivery is 10/24/2021 or 10/25/2021. Us Ob Greater Than 14 Weeks Additional Fetus  Result Date: 5/4/2021  Twin live intrauterine pregnancies with gestational ages of 14 weeks, 1 day and 15 weeks, 2 days as detailed above. Estimated date of delivery is 10/24/2021 or 10/25/2021. Us Pelvis Limited  Result Date: 5/5/2021  7 mm stone in the region of the right ureterovesical junction and no right ureteral jet identified suspicious for distal right ureteral calculus. Otherwise limited evaluation of the bladder due to nondistention. Fl Less Than 1 Hour  Result Date: 5/5/2021  Radiology exam is complete. No Radiologist dictation. Please follow up with ordering provider. Xr Chest Portable  Result Date: 5/4/2021  No acute abnormality.      Us Abdomen Limited  Result Date: 5/5/2021  Mild right hydronephrosis.   Small calculus within the urinary bladder No evidence of cholelithiasis       Code Status:  Full Code        Joe Schilling MD

## 2021-05-10 LAB
CULTURE: NORMAL
CULTURE: NORMAL
Lab: NORMAL
Lab: NORMAL
SPECIMEN: NORMAL
SPECIMEN: NORMAL

## 2021-06-11 ENCOUNTER — HOSPITAL ENCOUNTER (EMERGENCY)
Age: 26
Discharge: HOME | End: 2021-06-11
Attending: EMERGENCY MEDICINE
Payer: COMMERCIAL

## 2021-06-11 VITALS
HEART RATE: 95 BPM | OXYGEN SATURATION: 100 % | RESPIRATION RATE: 15 BRPM | BODY MASS INDEX: 40.57 KG/M2 | SYSTOLIC BLOOD PRESSURE: 131 MMHG | HEIGHT: 63 IN | DIASTOLIC BLOOD PRESSURE: 86 MMHG | WEIGHT: 229 LBS | TEMPERATURE: 97.9 F

## 2021-06-11 DIAGNOSIS — R07.9 CHEST PAIN, UNSPECIFIED TYPE: ICD-10-CM

## 2021-06-11 DIAGNOSIS — R00.2 PALPITATIONS: Primary | ICD-10-CM

## 2021-06-11 LAB
EKG ATRIAL RATE: 97 BPM
EKG DIAGNOSIS: NORMAL
EKG P AXIS: 39 DEGREES
EKG P-R INTERVAL: 142 MS
EKG Q-T INTERVAL: 348 MS
EKG QRS DURATION: 94 MS
EKG QTC CALCULATION (BAZETT): 441 MS
EKG R AXIS: 11 DEGREES
EKG T AXIS: 33 DEGREES
EKG VENTRICULAR RATE: 97 BPM

## 2021-06-11 PROCEDURE — 99283 EMERGENCY DEPT VISIT LOW MDM: CPT

## 2021-06-11 PROCEDURE — 93005 ELECTROCARDIOGRAM TRACING: CPT | Performed by: EMERGENCY MEDICINE

## 2021-06-11 PROCEDURE — 93010 ELECTROCARDIOGRAM REPORT: CPT | Performed by: INTERNAL MEDICINE

## 2021-06-11 NOTE — LETTER
[facility] Emergency Department  [id number]  Phone: [phone]  Fax: [phone]             June 11, 2021    Patient: Jenny Eastman   YOB: 1995   Date of Visit: 6/11/2021       To Whom It May Concern:    Rosendo Morgan was seen and treated in our emergency department on 6/11/2021. She may return to work on 06/12/21.       Sincerely,             Signature:__________________________________

## 2021-06-11 NOTE — ED PROVIDER NOTES
Emergency Department Encounter    Patient: Hector Sharp  MRN: [id number]  : 1995  Date of Evaluation: 2021  ED Provider:  Alex Gann MD    Triage Chief Complaint:   Palpitations    [place]:  Hector Sharp is a 31 y.o. female with a past with history of anxiety, asthma and depression. She is currently 20 weeks with twin gestation that presents with concern for intermittent palpitations and chest pain for several weeks. No cough or congestion. No fever or chills. No abdominal pain. No vaginal discharge. No vaginal bleeding. No nausea or vomiting. No diarrhea. Patient denies any history of DVT or PE. Patient denies any lower extremity pain, redness or unilateral swelling. She has had some mild bilateral lower leg swelling with the pregnancy. Patient has discussed these concerns with her OB/GYN. Patient is scheduled to be evaluated by cardiology. Patient denies any complications with her previous pregnancy.     ROS - see HPI, below listed is current ROS at time of my eval:  General:  No fevers, no weakness  Eyes:  no discharge  ENT:  No sore throat, no nasal congestion  Cardiovascular:  + chest pain, no palpitations  Respiratory:  No shortness of breath, no cough, no wheezing  Gastrointestinal:  No pain, no nausea, no vomiting, no diarrhea  Musculoskeletal:  No muscle pain, no joint pain  Skin:  No rash, no pruritis  Neurologic:  No speech problems, no headache, no extremity numbness, no extremity tingling, no extremity weakness  Psychiatric:  No anxiety  Genitourinary:  No dysuria, no hematuria  Endocrine:  No unexpected weight gain, no unexpected weight loss  Extremities:  no edema, no pain    Past Medical History:   Diagnosis Date    Anxiety disorder     Asthma     Depression      Past Surgical History:   Procedure Laterality Date     SECTION       SECTION N/A 2020     [facility]    CYSTOSCOPY, RIGHT URETEROSCOPY performed by Maryam Licea MD at [facility] OR     Family History   Problem Relation Age of Onset    Cancer Other     Diabetes Other      Social History     Socioeconomic History    Marital status: Single     Spouse name: Not on file    Number of children: Not on file    Years of education: Not on file    Highest education level: Not on file   Occupational History    Not on file   Tobacco Use    Smoking status: Current Every Day Smoker     Packs/day: 1.00     Types: Cigarettes     Last attempt to quit: 3/3/2015     Years since quittin.2    Smokeless tobacco: Never Used   Vaping Use    Vaping Use: Never used   Substance and Sexual Activity    Alcohol use: Yes     Comment: occ-wine 6 glasses during the pregnancy    Drug use: No    Sexual activity: Yes     Partners: Male   Other Topics Concern    Not on file   Social History Narrative    Not on file     Social Determinants of Health     Financial Resource Strain:     Difficulty of Paying Living Expenses:    Food Insecurity:     Worried About Running Out of Food in the Last Year:     Ran Out of Food in the Last Year:    Transportation Needs:     Lack of Transportation (Medical):  Lack of Transportation (Non-Medical):    Physical Activity:     Days of Exercise per Week:     Minutes of Exercise per Session:    Stress:     Feeling of Stress :    Social Connections:     Frequency of Communication with Friends and Family:     Frequency of Social Gatherings with Friends and Family:     Attends Sikh Services:     Active Member of Clubs or Organizations:     Attends Club or Organization Meetings:     Marital Status:    Intimate Partner Violence:     Fear of Current or Ex-Partner:     Emotionally Abused:     Physically Abused:     Sexually Abused:      No current facility-administered medications for this encounter.      Current Outpatient Medications   Medication Sig Dispense Refill    Neurological:  Alert and oriented times 3. Psychiatric:  Appropriate    I have reviewed and interpreted all of the currently available lab results from this visit (if applicable):  Results for orders placed or performed during the hospital encounter of 06/11/21   EKG 12 Lead   Result Value Ref Range    Ventricular Rate 97 BPM    Atrial Rate 97 BPM    P-R Interval 142 ms    QRS Duration 94 ms    Q-T Interval 348 ms    QTc Calculation (Bazett) 441 ms    P Axis 39 degrees    R Axis 11 degrees    T Axis 33 degrees    Diagnosis       Normal sinus rhythm with sinus arrhythmia  Normal ECG  When compared with ECG of 04-MAY-2021 15:31,  No significant change was found  Confirmed by London Norton MD, Radhames Ball ([id number]) on 6/11/2021 10:32:51 AM        Radiographs (if obtained):  Radiologist's Report Reviewed:  No results found. MDM:  Patient presented with chest pain and palpitations. Patient has had intermittent symptoms for several weeks. Given history and presentation I do not feel any further cardiac workup/work-up for PE is indicated at this point time. Patient had an EKG which did not show any concerning findings such as hypertrophy, Brugada, WPW, ischemia or any other abnormalities. Patient's pulse ox was normal.  Patient had no ectopy on cardiac monitoring. Patient's hemoglobin was 10.9 on 5/6/2021. The risk of further workup or hospitalization is likely higher than the risk of the patient having a PE or ACS. It is, therefore, in the patients best interest not to do additional emergent testing or be hospitalized. I discussed strict ED return precautions which includes but was not limited to lower extremity swelling, redness, pain, shortness of breath, worsening chest pain or any medical concerns. Discussed importance of continuing the planned evaluation by cardiology; will most likely have Holter placed.   After discussing patient's presentation risk of PE patient was comfortable being discharged without any further evaluation or work-up. Patient was discharged in good condition with stable vitals. Clinical Impression:  1. Palpitations    2. Chest pain, unspecified type      Disposition referral (if applicable):  MARGARITO Duncan CNP  [address] [id number]  [phone]    Call   FOR CLOSE FOLLOW UP    Disposition medications (if applicable):  Discharge Medication List as of 6/11/2021  3:33 AM        ED Provider Disposition Time  DISPOSITION Decision To Discharge 06/11/2021 03:26:55 AM      Comment: Please note this report has been produced using speech recognition software and may contain errors related to that system including errors in grammar, punctuation, and spelling, as well as words and phrases that may be inappropriate. Efforts were made to edit the dictations.                       EKG done at 043  Rate 97  Sinus  Normal axis  Normal intervals  No concerning ST or T wave changes    Sinus rhythm rate 97 with no specific ST or T wave changes     Kacey Doll MD  06/14/21 [id number]

## 2021-08-01 ENCOUNTER — HOSPITAL ENCOUNTER (OUTPATIENT)
Age: 26
Discharge: HOME | End: 2021-08-02
Attending: OBSTETRICS & GYNECOLOGY | Admitting: OBSTETRICS & GYNECOLOGY
Payer: COMMERCIAL

## 2021-08-01 PROBLEM — O26.93 PREGNANCY RELATED CONDITION IN THIRD TRIMESTER: Status: ACTIVE | Noted: 2021-08-01

## 2021-08-01 RX ORDER — ASPIRIN 81 MG/1
81 TABLET, CHEWABLE ORAL DAILY
COMMUNITY

## 2021-08-02 VITALS
SYSTOLIC BLOOD PRESSURE: 144 MMHG | HEART RATE: 90 BPM | WEIGHT: 234 LBS | BODY MASS INDEX: 41.46 KG/M2 | HEIGHT: 63 IN | TEMPERATURE: 98.2 F | OXYGEN SATURATION: 98 % | RESPIRATION RATE: 18 BRPM | DIASTOLIC BLOOD PRESSURE: 79 MMHG

## 2021-08-02 LAB
ALBUMIN SERPL-MCNC: 3.4 GM/DL (ref 3.4–5)
ALP BLD-CCNC: 119 IU/L (ref 40–128)
ALT SERPL-CCNC: 6 U/L (ref 10–40)
AMORPHOUS: ABNORMAL /HPF
AMPHETAMINES: NEGATIVE
ANION GAP SERPL CALCULATED.3IONS-SCNC: 11 MMOL/L (ref 4–16)
AST SERPL-CCNC: 11 IU/L (ref 15–37)
BACTERIA: ABNORMAL /HPF
BARBITURATE SCREEN URINE: NEGATIVE
BENZODIAZEPINE SCREEN, URINE: NEGATIVE
BILIRUB SERPL-MCNC: 0.2 MG/DL (ref 0–1)
BILIRUBIN URINE: NEGATIVE MG/DL
BLOOD, URINE: NEGATIVE
BUN BLDV-MCNC: 4 MG/DL (ref 6–23)
CALCIUM SERPL-MCNC: 9.7 MG/DL (ref 8.3–10.6)
CANNABINOID SCREEN URINE: NEGATIVE
CHLORIDE BLD-SCNC: 103 MMOL/L (ref 99–110)
CLARITY: ABNORMAL
CO2: 25 MMOL/L (ref 21–32)
COCAINE METABOLITE: NEGATIVE
COLOR: YELLOW
CREAT SERPL-MCNC: 0.4 MG/DL (ref 0.6–1.1)
CREATININE URINE: 75.5 MG/DL (ref 28–217)
GFR AFRICAN AMERICAN: >60 ML/MIN/1.73M2
GFR NON-AFRICAN AMERICAN: >60 ML/MIN/1.73M2
GLUCOSE BLD-MCNC: 77 MG/DL (ref 70–99)
GLUCOSE, URINE: NEGATIVE MG/DL
HCT VFR BLD CALC: 31.4 % (ref 37–47)
HEMOGLOBIN: 10.6 GM/DL (ref 12.5–16)
KETONES, URINE: NEGATIVE MG/DL
LEUKOCYTE ESTERASE, URINE: NEGATIVE
MCH RBC QN AUTO: 29.9 PG (ref 27–31)
MCHC RBC AUTO-ENTMCNC: 33.8 % (ref 32–36)
MCV RBC AUTO: 88.5 FL (ref 78–100)
MUCUS: ABNORMAL HPF
NITRITE URINE, QUANTITATIVE: NEGATIVE
OPIATES, URINE: NEGATIVE
OXYCODONE: NEGATIVE
PDW BLD-RTO: 15.9 % (ref 11.7–14.9)
PH, URINE: 7 (ref 5–8)
PHENCYCLIDINE, URINE: NEGATIVE
PLATELET # BLD: 393 K/CU MM (ref 140–440)
PMV BLD AUTO: 9.3 FL (ref 7.5–11.1)
POTASSIUM SERPL-SCNC: 2.9 MMOL/L (ref 3.5–5.1)
PROT/CREAT RATIO, UR: 0.3
PROTEIN UA: NEGATIVE MG/DL
RBC # BLD: 3.55 M/CU MM (ref 4.2–5.4)
RBC URINE: ABNORMAL /HPF (ref 0–6)
SODIUM BLD-SCNC: 139 MMOL/L (ref 135–145)
SPECIFIC GRAVITY UA: 1.01 (ref 1–1.03)
SQUAMOUS EPITHELIAL: 8 /HPF
TOTAL PROTEIN: 6.4 GM/DL (ref 6.4–8.2)
TRICHOMONAS: ABNORMAL /HPF
URIC ACID: 4 MG/DL (ref 2.6–6)
URINE TOTAL PROTEIN: 19.3 MG/DL
UROBILINOGEN, URINE: 2 MG/DL (ref 0.2–1)
WBC # BLD: 14.9 K/CU MM (ref 4–10.5)
WBC UA: 3 /HPF (ref 0–5)

## 2021-08-02 PROCEDURE — 81001 URINALYSIS AUTO W/SCOPE: CPT

## 2021-08-02 PROCEDURE — 84550 ASSAY OF BLOOD/URIC ACID: CPT

## 2021-08-02 PROCEDURE — 84156 ASSAY OF PROTEIN URINE: CPT

## 2021-08-02 PROCEDURE — 82570 ASSAY OF URINE CREATININE: CPT

## 2021-08-02 PROCEDURE — 99211 OFF/OP EST MAY X REQ PHY/QHP: CPT

## 2021-08-02 PROCEDURE — 80307 DRUG TEST PRSMV CHEM ANLYZR: CPT

## 2021-08-02 PROCEDURE — 85027 COMPLETE CBC AUTOMATED: CPT

## 2021-08-02 PROCEDURE — 80053 COMPREHEN METABOLIC PANEL: CPT

## 2021-08-02 NOTE — PROGRESS NOTES
TR to DR. Pauline Santiago re: patient request to be discharged home since PCR results will take 2 hours to result due to monthly maintenance on lab equipment. Orders received to discharge home.

## 2021-08-02 NOTE — PROGRESS NOTES
Patient arrives to [facility] for concerns of elevated blood pressure. To room LT02. Oriented to room and call light. Instructed to obtain CCMSUA and change into gown.

## 2021-08-02 NOTE — PROGRESS NOTES
TR to DR. Antolin Cunningham re: patient arrival with concerns of elevated BP at home greater that 126 systolic, OB history of preeclampsia, assessment, elevated BP, FHR tracing obtained for both babies. Orders received.

## 2021-08-02 NOTE — PROGRESS NOTES
Patient updated on delay of results for PCR due to monthly maintenance. Patient discouraged by the expected wait time of results. Patient questions if she needs to stay. Reports that the bed is very uncomfortable.

## 2021-08-02 NOTE — PROGRESS NOTES
EFM and TOCO applied. Patient reports that she monitors her blood pressure at home and reports blood pressures systolic greater than 856. OB history reviewed. Assessment complete. POC reviewed. Patient voices understanding and agreement.

## 2021-08-03 ENCOUNTER — ROUTINE PRENATAL (OUTPATIENT)
Dept: OBGYN | Age: 26
End: 2021-08-03
Payer: COMMERCIAL

## 2021-08-03 VITALS — WEIGHT: 241 LBS | BODY MASS INDEX: 42.69 KG/M2 | SYSTOLIC BLOOD PRESSURE: 147 MMHG | DIASTOLIC BLOOD PRESSURE: 86 MMHG

## 2021-08-03 DIAGNOSIS — Z3A.28 28 WEEKS GESTATION OF PREGNANCY: Primary | ICD-10-CM

## 2021-08-03 DIAGNOSIS — E87.6 HYPOKALEMIA: ICD-10-CM

## 2021-08-03 DIAGNOSIS — O30.043 DICHORIONIC DIAMNIOTIC TWIN PREGNANCY IN THIRD TRIMESTER: ICD-10-CM

## 2021-08-03 DIAGNOSIS — O14.03 MILD PREECLAMPSIA, THIRD TRIMESTER: Primary | ICD-10-CM

## 2021-08-03 DIAGNOSIS — Z3A.28 28 WEEKS GESTATION OF PREGNANCY: ICD-10-CM

## 2021-08-03 DIAGNOSIS — O09.93 HIGH-RISK PREGNANCY IN THIRD TRIMESTER: ICD-10-CM

## 2021-08-03 LAB
ALT SERPL-CCNC: 6 U/L (ref 10–40)
AST SERPL-CCNC: 11 U/L (ref 15–37)
ATYPICAL LYMPHOCYTE RELATIVE PERCENT: 2 % (ref 0–6)
BANDED NEUTROPHILS RELATIVE PERCENT: 4 % (ref 0–7)
BASOPHILS ABSOLUTE: 0.3 K/UL (ref 0–0.2)
BASOPHILS RELATIVE PERCENT: 2 %
CREAT SERPL-MCNC: <0.5 MG/DL (ref 0.6–1.1)
EOSINOPHILS ABSOLUTE: 0.1 K/UL (ref 0–0.6)
EOSINOPHILS RELATIVE PERCENT: 1 %
GFR AFRICAN AMERICAN: >60
GFR NON-AFRICAN AMERICAN: >60
HCT VFR BLD CALC: 31.1 % (ref 36–48)
HEMOGLOBIN: 10.6 G/DL (ref 12–16)
LYMPHOCYTES ABSOLUTE: 2 K/UL (ref 1–5.1)
LYMPHOCYTES RELATIVE PERCENT: 13 %
MCH RBC QN AUTO: 29.4 PG (ref 26–34)
MCHC RBC AUTO-ENTMCNC: 34.1 G/DL (ref 31–36)
MCV RBC AUTO: 86.2 FL (ref 80–100)
MONOCYTES ABSOLUTE: 0.4 K/UL (ref 0–1.3)
MONOCYTES RELATIVE PERCENT: 3 %
NEUTROPHILS ABSOLUTE: 10.6 K/UL (ref 1.7–7.7)
NEUTROPHILS RELATIVE PERCENT: 75 %
PDW BLD-RTO: 16.7 % (ref 12.4–15.4)
PLATELET # BLD: 409 K/UL (ref 135–450)
PLATELET SLIDE REVIEW: ADEQUATE
PMV BLD AUTO: 7.9 FL (ref 5–10.5)
RBC # BLD: 3.61 M/UL (ref 4–5.2)
SLIDE REVIEW: ABNORMAL
URIC ACID, SERUM: 3.5 MG/DL (ref 2.6–6)
WBC # BLD: 13.4 K/UL (ref 4–11)

## 2021-08-03 PROCEDURE — 0502F SUBSEQUENT PRENATAL CARE: CPT | Performed by: NURSE PRACTITIONER

## 2021-08-03 PROCEDURE — 36415 COLL VENOUS BLD VENIPUNCTURE: CPT | Performed by: NURSE PRACTITIONER

## 2021-08-03 RX ORDER — BUPROPION HYDROCHLORIDE 150 MG/1
150 TABLET ORAL EVERY MORNING
COMMUNITY

## 2021-08-03 RX ORDER — ESCITALOPRAM OXALATE 20 MG/1
10 TABLET ORAL DAILY
Qty: 30 TABLET | Refills: 0 | Status: SHIPPED
Start: 2021-08-03

## 2021-08-03 SDOH — ECONOMIC STABILITY: FOOD INSECURITY: WITHIN THE PAST 12 MONTHS, YOU WORRIED THAT YOUR FOOD WOULD RUN OUT BEFORE YOU GOT MONEY TO BUY MORE.: NEVER TRUE

## 2021-08-03 SDOH — ECONOMIC STABILITY: FOOD INSECURITY: WITHIN THE PAST 12 MONTHS, THE FOOD YOU BOUGHT JUST DIDN'T LAST AND YOU DIDN'T HAVE MONEY TO GET MORE.: NEVER TRUE

## 2021-08-03 ASSESSMENT — PATIENT HEALTH QUESTIONNAIRE - PHQ9
2. FEELING DOWN, DEPRESSED OR HOPELESS: 1
5. POOR APPETITE OR OVEREATING: 0
SUM OF ALL RESPONSES TO PHQ QUESTIONS 1-9: 15
7. TROUBLE CONCENTRATING ON THINGS, SUCH AS READING THE NEWSPAPER OR WATCHING TELEVISION: 3
9. THOUGHTS THAT YOU WOULD BE BETTER OFF DEAD, OR OF HURTING YOURSELF: 0
1. LITTLE INTEREST OR PLEASURE IN DOING THINGS: 2
10. IF YOU CHECKED OFF ANY PROBLEMS, HOW DIFFICULT HAVE THESE PROBLEMS MADE IT FOR YOU TO DO YOUR WORK, TAKE CARE OF THINGS AT HOME, OR GET ALONG WITH OTHER PEOPLE: 3
DEPRESSION UNABLE TO ASSESS: FUNCTIONAL CAPACITY MOTIVATION LIMITS ACCURACY
4. FEELING TIRED OR HAVING LITTLE ENERGY: 2
6. FEELING BAD ABOUT YOURSELF - OR THAT YOU ARE A FAILURE OR HAVE LET YOURSELF OR YOUR FAMILY DOWN: 1
8. MOVING OR SPEAKING SO SLOWLY THAT OTHER PEOPLE COULD HAVE NOTICED. OR THE OPPOSITE, BEING SO FIGETY OR RESTLESS THAT YOU HAVE BEEN MOVING AROUND A LOT MORE THAN USUAL: 3
3. TROUBLE FALLING OR STAYING ASLEEP: 3
SUM OF ALL RESPONSES TO PHQ9 QUESTIONS 1 & 2: 3
SUM OF ALL RESPONSES TO PHQ QUESTIONS 1-9: 15
SUM OF ALL RESPONSES TO PHQ QUESTIONS 1-9: 15

## 2021-08-03 ASSESSMENT — COLUMBIA-SUICIDE SEVERITY RATING SCALE - C-SSRS
2. HAVE YOU ACTUALLY HAD ANY THOUGHTS OF KILLING YOURSELF?: NO
6. HAVE YOU EVER DONE ANYTHING, STARTED TO DO ANYTHING, OR PREPARED TO DO ANYTHING TO END YOUR LIFE?: NO
1. WITHIN THE PAST MONTH, HAVE YOU WISHED YOU WERE DEAD OR WISHED YOU COULD GO TO SLEEP AND NOT WAKE UP?: NO

## 2021-08-03 ASSESSMENT — SOCIAL DETERMINANTS OF HEALTH (SDOH): HOW HARD IS IT FOR YOU TO PAY FOR THE VERY BASICS LIKE FOOD, HOUSING, MEDICAL CARE, AND HEATING?: HARD

## 2021-08-03 NOTE — PROGRESS NOTES
Return OB Office Visit    CC:   Chief Complaint   Patient presents with    Routine Prenatal Visit     pt c/o gerardo peralta, u/s today. unable to void. ed       HPI:  Patient seen and examined. No concerns/complaints. Denies VB, LOF, ctx. +Fm. Denies headaches, vision changes, RUQ pain, increased LE edema. Denies chest pain, shortness of breath, fever, chills, nausea, vomiting. Review of Systems: The following ROS was otherwise negative, except as noted in the HPI: constitutional, HEENT, respiratory, cardiovascular, gastrointestinal, genitourinary, skin, musculoskeletal, neurological, psych    Objective:  BP (!) 155/89   Wt 241 lb (109.3 kg)   LMP 01/17/2021   BMI 42.69 kg/m²        Physical Exam    Gravid, non tender, no flank pain    FHR: + by u/s  Assessment/Plan:   Caro Tomas is a 31 y.o. [id number] at 28w2d who presents for routine OB visit     Diagnosis Orders   1. Mild preeclampsia, third trimester  AST(SGOT) & ALT(SGPT)    Creatinine, Serum    Uric Acid    CBC Auto Differential   2. 28 weeks gestation of pregnancy  AST(SGOT) & ALT(SGPT)    Creatinine, Serum    Uric Acid    CBC Auto Differential    POTASSIUM   3. High-risk pregnancy in third trimester  AST(SGOT) & ALT(SGPT)    Creatinine, Serum    Uric Acid    CBC Auto Differential    POTASSIUM   4. Dichorionic diamniotic twin pregnancy in third trimester     5. Hypokalemia  POTASSIUM     Orders Placed This Encounter   Procedures    AST(SGOT) & ALT(SGPT)    Creatinine, Serum    Uric Acid    CBC Auto Differential    POTASSIUM       PTL s/s and FM patterns reviewed, report bldg/lof. PIH precautions reviewed, Rx for BP cuff and monitor  BPP reviewed 8/8, 8/8    Return in about 2 weeks (around 8/17/2021).     Armond Ramos, APRN - CNP

## 2021-08-04 LAB — POTASSIUM SERPL-SCNC: 2.9 MMOL/L (ref 3.5–5.1)

## 2021-08-05 ENCOUNTER — HOSPITAL ENCOUNTER (OUTPATIENT)
Age: 26
Discharge: HOME | End: 2021-08-05
Attending: OBSTETRICS & GYNECOLOGY | Admitting: OBSTETRICS & GYNECOLOGY
Payer: COMMERCIAL

## 2021-08-05 LAB
ALBUMIN SERPL-MCNC: 3.4 GM/DL (ref 3.4–5)
ALP BLD-CCNC: 119 IU/L (ref 40–128)
ALT SERPL-CCNC: 6 U/L (ref 10–40)
ANION GAP SERPL CALCULATED.3IONS-SCNC: 9 MMOL/L (ref 4–16)
AST SERPL-CCNC: 11 IU/L (ref 15–37)
BACTERIA: ABNORMAL /HPF
BILIRUB SERPL-MCNC: 0.2 MG/DL (ref 0–1)
BILIRUBIN URINE: NEGATIVE MG/DL
BLOOD, URINE: NEGATIVE
BUN BLDV-MCNC: 3 MG/DL (ref 6–23)
CALCIUM SERPL-MCNC: 8.6 MG/DL (ref 8.3–10.6)
CHLORIDE BLD-SCNC: 101 MMOL/L (ref 99–110)
CLARITY: CLEAR
CO2: 25 MMOL/L (ref 21–32)
COLOR: YELLOW
CREAT SERPL-MCNC: 0.3 MG/DL (ref 0.6–1.1)
GFR AFRICAN AMERICAN: >60 ML/MIN/1.73M2
GFR NON-AFRICAN AMERICAN: >60 ML/MIN/1.73M2
GLUCOSE BLD-MCNC: 60 MG/DL (ref 70–99)
GLUCOSE, URINE: NEGATIVE MG/DL
HYALINE CASTS: 0 /LPF
KETONES, URINE: NEGATIVE MG/DL
LEUKOCYTE ESTERASE, URINE: ABNORMAL
NITRITE URINE, QUANTITATIVE: NEGATIVE
NON SQUAM EPI CELLS: <1 /HPF
PH, URINE: 7 (ref 5–8)
POTASSIUM SERPL-SCNC: 3 MMOL/L (ref 3.5–5.1)
PROTEIN UA: NEGATIVE MG/DL
RBC URINE: <1 /HPF (ref 0–6)
SODIUM BLD-SCNC: 135 MMOL/L (ref 135–145)
SPECIFIC GRAVITY UA: 1.01 (ref 1–1.03)
SQUAMOUS EPITHELIAL: 9 /HPF
TOTAL PROTEIN: 6 GM/DL (ref 6.4–8.2)
TRICHOMONAS: ABNORMAL /HPF
UROBILINOGEN, URINE: NEGATIVE MG/DL (ref 0.2–1)
WBC UA: 5 /HPF (ref 0–5)

## 2021-08-05 PROCEDURE — 80053 COMPREHEN METABOLIC PANEL: CPT

## 2021-08-05 PROCEDURE — 6370000000 HC RX 637 (ALT 250 FOR IP): Performed by: OBSTETRICS & GYNECOLOGY

## 2021-08-05 PROCEDURE — 96372 THER/PROPH/DIAG INJ SC/IM: CPT

## 2021-08-05 PROCEDURE — 81001 URINALYSIS AUTO W/SCOPE: CPT

## 2021-08-05 PROCEDURE — 99211 OFF/OP EST MAY X REQ PHY/QHP: CPT

## 2021-08-05 PROCEDURE — 6360000002 HC RX W HCPCS: Performed by: OBSTETRICS & GYNECOLOGY

## 2021-08-05 RX ORDER — BETAMETHASONE SODIUM PHOSPHATE AND BETAMETHASONE ACETATE 3; 3 MG/ML; MG/ML
12 INJECTION, SUSPENSION INTRA-ARTICULAR; INTRALESIONAL; INTRAMUSCULAR; SOFT TISSUE ONCE
Status: COMPLETED | OUTPATIENT
Start: 2021-08-05 | End: 2021-08-05

## 2021-08-05 RX ORDER — POTASSIUM CHLORIDE 20 MEQ/1
40 TABLET, EXTENDED RELEASE ORAL ONCE
Status: COMPLETED | OUTPATIENT
Start: 2021-08-05 | End: 2021-08-05

## 2021-08-05 RX ORDER — ONDANSETRON 4 MG/1
4 TABLET, ORALLY DISINTEGRATING ORAL ONCE
Status: COMPLETED | OUTPATIENT
Start: 2021-08-05 | End: 2021-08-05

## 2021-08-05 RX ADMIN — POTASSIUM CHLORIDE 40 MEQ: 1500 TABLET, EXTENDED RELEASE ORAL at 21:24

## 2021-08-05 RX ADMIN — BETAMETHASONE SODIUM PHOSPHATE AND BETAMETHASONE ACETATE 12 MG: 3; 3 INJECTION, SUSPENSION INTRA-ARTICULAR; INTRALESIONAL; INTRAMUSCULAR at 20:31

## 2021-08-05 RX ADMIN — ONDANSETRON 4 MG: 4 TABLET, ORALLY DISINTEGRATING ORAL at 20:07

## 2021-08-06 ENCOUNTER — HOSPITAL ENCOUNTER (OUTPATIENT)
Age: 26
Discharge: HOME | End: 2021-08-06
Attending: OBSTETRICS & GYNECOLOGY | Admitting: OBSTETRICS & GYNECOLOGY
Payer: COMMERCIAL

## 2021-08-06 ENCOUNTER — HOSPITAL ENCOUNTER (OUTPATIENT)
Age: 26
Discharge: HOME | End: 2021-08-07
Attending: OBSTETRICS & GYNECOLOGY | Admitting: OBSTETRICS & GYNECOLOGY
Payer: COMMERCIAL

## 2021-08-06 VITALS
RESPIRATION RATE: 18 BRPM | TEMPERATURE: 98 F | HEART RATE: 104 BPM | DIASTOLIC BLOOD PRESSURE: 78 MMHG | OXYGEN SATURATION: 98 % | SYSTOLIC BLOOD PRESSURE: 146 MMHG

## 2021-08-06 VITALS
HEART RATE: 100 BPM | SYSTOLIC BLOOD PRESSURE: 147 MMHG | RESPIRATION RATE: 16 BRPM | DIASTOLIC BLOOD PRESSURE: 75 MMHG | BODY MASS INDEX: 42.7 KG/M2 | WEIGHT: 241 LBS | HEIGHT: 63 IN | OXYGEN SATURATION: 98 % | TEMPERATURE: 98 F

## 2021-08-06 LAB
ANION GAP SERPL CALCULATED.3IONS-SCNC: 12 MMOL/L (ref 4–16)
BUN BLDV-MCNC: 4 MG/DL (ref 6–23)
CALCIUM SERPL-MCNC: 9.5 MG/DL (ref 8.3–10.6)
CHLORIDE BLD-SCNC: 103 MMOL/L (ref 99–110)
CO2: 23 MMOL/L (ref 21–32)
CREAT SERPL-MCNC: 0.4 MG/DL (ref 0.6–1.1)
GFR AFRICAN AMERICAN: >60 ML/MIN/1.73M2
GFR NON-AFRICAN AMERICAN: >60 ML/MIN/1.73M2
GLUCOSE BLD-MCNC: 113 MG/DL (ref 70–99)
MAGNESIUM: 1.5 MG/DL (ref 1.8–2.4)
POTASSIUM SERPL-SCNC: 2.9 MMOL/L (ref 3.5–5.1)
SODIUM BLD-SCNC: 138 MMOL/L (ref 135–145)

## 2021-08-06 PROCEDURE — 99211 OFF/OP EST MAY X REQ PHY/QHP: CPT

## 2021-08-06 PROCEDURE — 96367 TX/PROPH/DG ADDL SEQ IV INF: CPT

## 2021-08-06 PROCEDURE — 6360000002 HC RX W HCPCS: Performed by: OBSTETRICS & GYNECOLOGY

## 2021-08-06 PROCEDURE — 96372 THER/PROPH/DIAG INJ SC/IM: CPT

## 2021-08-06 PROCEDURE — 80048 BASIC METABOLIC PNL TOTAL CA: CPT

## 2021-08-06 PROCEDURE — 83735 ASSAY OF MAGNESIUM: CPT

## 2021-08-06 PROCEDURE — 96366 THER/PROPH/DIAG IV INF ADDON: CPT

## 2021-08-06 RX ORDER — BETAMETHASONE SODIUM PHOSPHATE AND BETAMETHASONE ACETATE 3; 3 MG/ML; MG/ML
12 INJECTION, SUSPENSION INTRA-ARTICULAR; INTRALESIONAL; INTRAMUSCULAR; SOFT TISSUE ONCE
Status: COMPLETED | OUTPATIENT
Start: 2021-08-06 | End: 2021-08-06

## 2021-08-06 RX ORDER — POTASSIUM CHLORIDE 20 MEQ/1
20 TABLET, EXTENDED RELEASE ORAL
Status: COMPLETED | OUTPATIENT
Start: 2021-08-07 | End: 2021-08-07

## 2021-08-06 RX ORDER — MAGNESIUM SULFATE IN WATER 40 MG/ML
2000 INJECTION, SOLUTION INTRAVENOUS ONCE
Status: COMPLETED | OUTPATIENT
Start: 2021-08-07 | End: 2021-08-07

## 2021-08-06 RX ADMIN — BETAMETHASONE SODIUM PHOSPHATE AND BETAMETHASONE ACETATE 12 MG: 3; 3 INJECTION, SUSPENSION INTRA-ARTICULAR; INTRALESIONAL; INTRAMUSCULAR at 20:10

## 2021-08-06 NOTE — PROGRESS NOTES
TR to Dr. Enma Arreola re: patient arrival, concerns of elevated BP at home systolic greater than 489, OB history, previous visit on Sunday with elevated BP and critical potasium level, assessment, VS and patient reports nausea. Orders received.

## 2021-08-06 NOTE — PROGRESS NOTES
Patient arrives ambulatory to [address] [address] for concerns of elevated BP at home. To room LT02. Oriented to room LT02. Instructed to obtain CCMSUA and change into gown.

## 2021-08-07 ENCOUNTER — HOSPITAL ENCOUNTER (OUTPATIENT)
Age: 26
Setting detail: SURGERY ADMIT
Discharge: HOME | End: 2021-08-07
Attending: OBSTETRICS & GYNECOLOGY | Admitting: OBSTETRICS & GYNECOLOGY
Payer: COMMERCIAL

## 2021-08-07 ENCOUNTER — HOSPITAL ENCOUNTER (OUTPATIENT)
Age: 26
Setting detail: OBSERVATION
LOS: 1 days | Discharge: ANOTHER HOSPITAL | End: 2021-08-09
Attending: OBSTETRICS & GYNECOLOGY | Admitting: OBSTETRICS & GYNECOLOGY
Payer: COMMERCIAL

## 2021-08-07 VITALS
RESPIRATION RATE: 16 BRPM | SYSTOLIC BLOOD PRESSURE: 125 MMHG | DIASTOLIC BLOOD PRESSURE: 66 MMHG | OXYGEN SATURATION: 98 % | HEART RATE: 102 BPM | TEMPERATURE: 98 F

## 2021-08-07 VITALS
HEART RATE: 102 BPM | DIASTOLIC BLOOD PRESSURE: 74 MMHG | TEMPERATURE: 98.5 F | SYSTOLIC BLOOD PRESSURE: 157 MMHG | RESPIRATION RATE: 18 BRPM

## 2021-08-07 LAB
ALBUMIN SERPL-MCNC: 3.9 GM/DL (ref 3.4–5)
ALP BLD-CCNC: 120 IU/L (ref 40–129)
ALT SERPL-CCNC: 6 U/L (ref 10–40)
ANION GAP SERPL CALCULATED.3IONS-SCNC: 10 MMOL/L (ref 4–16)
AST SERPL-CCNC: 10 IU/L (ref 15–37)
BILIRUB SERPL-MCNC: 0.1 MG/DL (ref 0–1)
BUN BLDV-MCNC: 4 MG/DL (ref 6–23)
CALCIUM SERPL-MCNC: 8.6 MG/DL (ref 8.3–10.6)
CHLORIDE BLD-SCNC: 104 MMOL/L (ref 99–110)
CO2: 25 MMOL/L (ref 21–32)
CREAT SERPL-MCNC: 0.4 MG/DL (ref 0.6–1.1)
GFR AFRICAN AMERICAN: >60 ML/MIN/1.73M2
GFR NON-AFRICAN AMERICAN: >60 ML/MIN/1.73M2
GLUCOSE BLD-MCNC: 115 MG/DL (ref 70–99)
MAGNESIUM: 1.7 MG/DL (ref 1.8–2.4)
POTASSIUM SERPL-SCNC: 2.9 MMOL/L (ref 3.5–5.1)
SODIUM BLD-SCNC: 139 MMOL/L (ref 135–145)
TOTAL PROTEIN: 6.4 GM/DL (ref 6.4–8.2)

## 2021-08-07 PROCEDURE — 80053 COMPREHEN METABOLIC PANEL: CPT

## 2021-08-07 PROCEDURE — 83735 ASSAY OF MAGNESIUM: CPT

## 2021-08-07 PROCEDURE — 99211 OFF/OP EST MAY X REQ PHY/QHP: CPT

## 2021-08-07 PROCEDURE — 6370000000 HC RX 637 (ALT 250 FOR IP): Performed by: OBSTETRICS & GYNECOLOGY

## 2021-08-07 PROCEDURE — 6360000002 HC RX W HCPCS: Performed by: OBSTETRICS & GYNECOLOGY

## 2021-08-07 PROCEDURE — G0378 HOSPITAL OBSERVATION PER HR: HCPCS

## 2021-08-07 PROCEDURE — 1220000000 HC SEMI PRIVATE OB R&B

## 2021-08-07 RX ORDER — FAMOTIDINE 20 MG/1
20 TABLET, FILM COATED ORAL 2 TIMES DAILY
Status: DISCONTINUED | OUTPATIENT
Start: 2021-08-07 | End: 2021-08-07 | Stop reason: HOSPADM

## 2021-08-07 RX ORDER — BUPROPION HYDROCHLORIDE 150 MG/1
150 TABLET ORAL DAILY
Status: DISCONTINUED | OUTPATIENT
Start: 2021-08-07 | End: 2021-08-07 | Stop reason: HOSPADM

## 2021-08-07 RX ORDER — ONDANSETRON 4 MG/1
4 TABLET, ORALLY DISINTEGRATING ORAL EVERY 8 HOURS PRN
Status: DISCONTINUED | OUTPATIENT
Start: 2021-08-07 | End: 2021-08-07 | Stop reason: HOSPADM

## 2021-08-07 RX ORDER — ONDANSETRON 4 MG/1
4 TABLET, ORALLY DISINTEGRATING ORAL EVERY 8 HOURS PRN
Status: DISCONTINUED | OUTPATIENT
Start: 2021-08-07 | End: 2021-08-09 | Stop reason: HOSPADM

## 2021-08-07 RX ORDER — POTASSIUM CHLORIDE 20 MEQ/1
20 TABLET, EXTENDED RELEASE ORAL EVERY 4 HOURS
Status: COMPLETED | OUTPATIENT
Start: 2021-08-08 | End: 2021-08-08

## 2021-08-07 RX ORDER — POTASSIUM CHLORIDE 7.45 MG/ML
10 INJECTION INTRAVENOUS
Status: DISPENSED | OUTPATIENT
Start: 2021-08-08 | End: 2021-08-08

## 2021-08-07 RX ORDER — ACETAMINOPHEN 325 MG/1
650 TABLET ORAL EVERY 4 HOURS PRN
Status: DISCONTINUED | OUTPATIENT
Start: 2021-08-07 | End: 2021-08-09 | Stop reason: HOSPADM

## 2021-08-07 RX ORDER — MAGNESIUM SULFATE IN WATER 40 MG/ML
2000 INJECTION, SOLUTION INTRAVENOUS
Status: DISPENSED | OUTPATIENT
Start: 2021-08-08 | End: 2021-08-08

## 2021-08-07 RX ORDER — POTASSIUM CHLORIDE 29.8 MG/ML
20 INJECTION INTRAVENOUS
Status: DISCONTINUED | OUTPATIENT
Start: 2021-08-08 | End: 2021-08-07

## 2021-08-07 RX ORDER — SODIUM CHLORIDE, SODIUM LACTATE, POTASSIUM CHLORIDE, CALCIUM CHLORIDE 600; 310; 30; 20 MG/100ML; MG/100ML; MG/100ML; MG/100ML
INJECTION, SOLUTION INTRAVENOUS CONTINUOUS
Status: DISCONTINUED | OUTPATIENT
Start: 2021-08-08 | End: 2021-08-09 | Stop reason: HOSPADM

## 2021-08-07 RX ORDER — ONDANSETRON 2 MG/ML
4 INJECTION INTRAMUSCULAR; INTRAVENOUS EVERY 6 HOURS PRN
Status: DISCONTINUED | OUTPATIENT
Start: 2021-08-07 | End: 2021-08-09 | Stop reason: HOSPADM

## 2021-08-07 RX ADMIN — MAGNESIUM SULFATE 2000 MG: 2 INJECTION INTRAVENOUS at 00:02

## 2021-08-07 RX ADMIN — POTASSIUM CHLORIDE 20 MEQ: 20 TABLET, EXTENDED RELEASE ORAL at 01:52

## 2021-08-07 RX ADMIN — POTASSIUM CHLORIDE 20 MEQ: 20 TABLET, EXTENDED RELEASE ORAL at 01:53

## 2021-08-07 RX ADMIN — BUPROPION HYDROCHLORIDE 150 MG: 150 TABLET, EXTENDED RELEASE ORAL at 01:52

## 2021-08-07 RX ADMIN — ONDANSETRON 4 MG: 4 TABLET, ORALLY DISINTEGRATING ORAL at 00:55

## 2021-08-07 NOTE — FLOWSHEET NOTE
Dr. Escobar Credit notified of patient's labs and blood pressures. Physician also notified of the patient's wishes to discharge and return at a later time for treatment due to prior engagements. Discharge order received.

## 2021-08-07 NOTE — PROGRESS NOTES
Patient returns ambulatory to [facility] after being informed by Dr. Soledad Sims to return for concerns of lab results. Patient to room LD05.

## 2021-08-07 NOTE — FLOWSHEET NOTE
Patient presents to labor and delivery for repeat lab draws. The patient confirms fetal movement and denies vaginal bleeding, leaking of fluid, or contractions. The patient reports that she is feeling better than she was last night.

## 2021-08-07 NOTE — FLOWSHEET NOTE
Dr. Jalil Hubbard present on unit and blood pressures reviewed with patient. No further orders at this time.

## 2021-08-08 PROBLEM — Z34.93 ENCOUNTER FOR PREGNANCY RELATED EXAMINATION IN THIRD TRIMESTER: Status: RESOLVED | Noted: 2020-01-23 | Resolved: 2021-08-08

## 2021-08-08 PROBLEM — Z32.02 PREGNANCY EXAMINATION OR TEST, NEGATIVE RESULT: Status: ACTIVE | Noted: 2021-08-08

## 2021-08-08 PROBLEM — E83.42 HYPOMAGNESEMIA: Status: ACTIVE | Noted: 2021-08-08

## 2021-08-08 PROBLEM — O26.93 PREGNANCY RELATED CONDITION IN THIRD TRIMESTER: Status: RESOLVED | Noted: 2021-08-01 | Resolved: 2021-08-08

## 2021-08-08 PROBLEM — O30.043 DICHORIONIC DIAMNIOTIC TWIN PREGNANCY IN THIRD TRIMESTER: Status: ACTIVE | Noted: 2021-08-08

## 2021-08-08 PROBLEM — O14.90 PREECLAMPSIA: Status: ACTIVE | Noted: 2021-08-08

## 2021-08-08 PROBLEM — E87.6 HYPOKALEMIA: Status: ACTIVE | Noted: 2021-08-08

## 2021-08-08 PROBLEM — Z32.02 PREGNANCY EXAMINATION OR TEST, NEGATIVE RESULT: Status: RESOLVED | Noted: 2021-08-08 | Resolved: 2021-08-08

## 2021-08-08 LAB
ALBUMIN SERPL-MCNC: 3.6 GM/DL (ref 3.4–5)
ALP BLD-CCNC: 112 IU/L (ref 40–129)
ALT SERPL-CCNC: 7 U/L (ref 10–40)
ANION GAP SERPL CALCULATED.3IONS-SCNC: 7 MMOL/L (ref 4–16)
AST SERPL-CCNC: 12 IU/L (ref 15–37)
BACTERIA: NEGATIVE /HPF
BILIRUB SERPL-MCNC: 0.1 MG/DL (ref 0–1)
BILIRUBIN URINE: NEGATIVE MG/DL
BLOOD, URINE: ABNORMAL
BUN BLDV-MCNC: 3 MG/DL (ref 6–23)
CALCIUM SERPL-MCNC: 8.4 MG/DL (ref 8.3–10.6)
CHLORIDE BLD-SCNC: 103 MMOL/L (ref 99–110)
CLARITY: CLEAR
CO2: 26 MMOL/L (ref 21–32)
COLOR: ABNORMAL
CREAT SERPL-MCNC: 0.4 MG/DL (ref 0.6–1.1)
CREATININE URINE: 23.2 MG/DL (ref 28–217)
GFR AFRICAN AMERICAN: >60 ML/MIN/1.73M2
GFR NON-AFRICAN AMERICAN: >60 ML/MIN/1.73M2
GLUCOSE BLD-MCNC: 93 MG/DL (ref 70–99)
GLUCOSE, URINE: NEGATIVE MG/DL
KETONES, URINE: NEGATIVE MG/DL
LACTATE DEHYDROGENASE: 154 IU/L (ref 120–246)
LEUKOCYTE ESTERASE, URINE: NEGATIVE
MAGNESIUM: 1.7 MG/DL (ref 1.8–2.4)
MUCUS: ABNORMAL HPF
NITRITE URINE, QUANTITATIVE: NEGATIVE
PH, URINE: 8 (ref 5–8)
POTASSIUM SERPL-SCNC: 3.1 MMOL/L (ref 3.5–5.1)
POTASSIUM, UR: 9.6 MMOL/L (ref 22–119)
PROT/CREAT RATIO, UR: 0.3
PROTEIN UA: NEGATIVE MG/DL
RBC URINE: <1 /HPF (ref 0–6)
RETICULOCYTE COUNT PCT: 1.9 % (ref 0.2–2.2)
SODIUM BLD-SCNC: 136 MMOL/L (ref 135–145)
SODIUM URINE: 52 MMOL/L (ref 35–167)
SPECIFIC GRAVITY UA: 1 (ref 1–1.03)
SQUAMOUS EPITHELIAL: <1 /HPF
TOTAL PROTEIN: 5.8 GM/DL (ref 6.4–8.2)
TRICHOMONAS: ABNORMAL /HPF
URINE TOTAL PROTEIN: 7.8 MG/DL
UROBILINOGEN, URINE: NEGATIVE MG/DL (ref 0.2–1)
WBC UA: 1 /HPF (ref 0–5)

## 2021-08-08 PROCEDURE — 6370000000 HC RX 637 (ALT 250 FOR IP): Performed by: OBSTETRICS & GYNECOLOGY

## 2021-08-08 PROCEDURE — 96367 TX/PROPH/DG ADDL SEQ IV INF: CPT

## 2021-08-08 PROCEDURE — 84133 ASSAY OF URINE POTASSIUM: CPT

## 2021-08-08 PROCEDURE — 36415 COLL VENOUS BLD VENIPUNCTURE: CPT

## 2021-08-08 PROCEDURE — 96365 THER/PROPH/DIAG IV INF INIT: CPT

## 2021-08-08 PROCEDURE — 83735 ASSAY OF MAGNESIUM: CPT

## 2021-08-08 PROCEDURE — 2580000003 HC RX 258: Performed by: OBSTETRICS & GYNECOLOGY

## 2021-08-08 PROCEDURE — 84156 ASSAY OF PROTEIN URINE: CPT

## 2021-08-08 PROCEDURE — 85045 AUTOMATED RETICULOCYTE COUNT: CPT

## 2021-08-08 PROCEDURE — 80053 COMPREHEN METABOLIC PANEL: CPT

## 2021-08-08 PROCEDURE — 83615 LACTATE (LD) (LDH) ENZYME: CPT

## 2021-08-08 PROCEDURE — 6360000002 HC RX W HCPCS: Performed by: OBSTETRICS & GYNECOLOGY

## 2021-08-08 PROCEDURE — 6370000000 HC RX 637 (ALT 250 FOR IP): Performed by: INTERNAL MEDICINE

## 2021-08-08 PROCEDURE — 84300 ASSAY OF URINE SODIUM: CPT

## 2021-08-08 PROCEDURE — 96375 TX/PRO/DX INJ NEW DRUG ADDON: CPT

## 2021-08-08 PROCEDURE — 96366 THER/PROPH/DIAG IV INF ADDON: CPT

## 2021-08-08 PROCEDURE — 81001 URINALYSIS AUTO W/SCOPE: CPT

## 2021-08-08 PROCEDURE — G0378 HOSPITAL OBSERVATION PER HR: HCPCS

## 2021-08-08 PROCEDURE — 82570 ASSAY OF URINE CREATININE: CPT

## 2021-08-08 RX ORDER — POTASSIUM CHLORIDE 29.8 MG/ML
20 INJECTION INTRAVENOUS
Status: DISCONTINUED | OUTPATIENT
Start: 2021-08-08 | End: 2021-08-08

## 2021-08-08 RX ORDER — NIFEDIPINE 30 MG/1
30 TABLET, EXTENDED RELEASE ORAL DAILY
Status: DISCONTINUED | OUTPATIENT
Start: 2021-08-08 | End: 2021-08-09 | Stop reason: HOSPADM

## 2021-08-08 RX ORDER — BUPROPION HYDROCHLORIDE 150 MG/1
150 TABLET ORAL DAILY
Status: DISCONTINUED | OUTPATIENT
Start: 2021-08-08 | End: 2021-08-09

## 2021-08-08 RX ORDER — MAGNESIUM SULFATE IN WATER 40 MG/ML
2000 INJECTION, SOLUTION INTRAVENOUS
Status: COMPLETED | OUTPATIENT
Start: 2021-08-08 | End: 2021-08-08

## 2021-08-08 RX ORDER — DIPHENHYDRAMINE HCL 25 MG
50 TABLET ORAL NIGHTLY PRN
Status: DISCONTINUED | OUTPATIENT
Start: 2021-08-08 | End: 2021-08-09 | Stop reason: HOSPADM

## 2021-08-08 RX ORDER — POTASSIUM CHLORIDE 20 MEQ/1
20 TABLET, EXTENDED RELEASE ORAL EVERY 4 HOURS
Status: DISCONTINUED | OUTPATIENT
Start: 2021-08-08 | End: 2021-08-08 | Stop reason: SDUPTHER

## 2021-08-08 RX ORDER — POTASSIUM CHLORIDE 7.45 MG/ML
10 INJECTION INTRAVENOUS
Status: COMPLETED | OUTPATIENT
Start: 2021-08-08 | End: 2021-08-08

## 2021-08-08 RX ORDER — POTASSIUM CHLORIDE 7.45 MG/ML
10 INJECTION INTRAVENOUS
Status: DISCONTINUED | OUTPATIENT
Start: 2021-08-08 | End: 2021-08-08

## 2021-08-08 RX ORDER — DIPHENHYDRAMINE HCL 25 MG
50 TABLET ORAL EVERY 6 HOURS PRN
Status: DISCONTINUED | OUTPATIENT
Start: 2021-08-08 | End: 2021-08-08

## 2021-08-08 RX ORDER — ESCITALOPRAM OXALATE 10 MG/1
20 TABLET ORAL DAILY
Status: DISCONTINUED | OUTPATIENT
Start: 2021-08-08 | End: 2021-08-09 | Stop reason: HOSPADM

## 2021-08-08 RX ORDER — POTASSIUM CHLORIDE 20 MEQ/1
40 TABLET, EXTENDED RELEASE ORAL
Status: DISCONTINUED | OUTPATIENT
Start: 2021-08-08 | End: 2021-08-09 | Stop reason: HOSPADM

## 2021-08-08 RX ADMIN — ONDANSETRON 4 MG: 2 INJECTION INTRAMUSCULAR; INTRAVENOUS at 01:18

## 2021-08-08 RX ADMIN — MAGNESIUM SULFATE 2000 MG: 2 INJECTION INTRAVENOUS at 15:59

## 2021-08-08 RX ADMIN — POTASSIUM CHLORIDE 10 MEQ: 7.46 INJECTION, SOLUTION INTRAVENOUS at 08:17

## 2021-08-08 RX ADMIN — POTASSIUM CHLORIDE 10 MEQ: 7.46 INJECTION, SOLUTION INTRAVENOUS at 04:00

## 2021-08-08 RX ADMIN — MAGNESIUM SULFATE 2000 MG: 2 INJECTION INTRAVENOUS at 18:18

## 2021-08-08 RX ADMIN — POTASSIUM CHLORIDE 40 MEQ: 20 TABLET, EXTENDED RELEASE ORAL at 16:58

## 2021-08-08 RX ADMIN — NIFEDIPINE 30 MG: 30 TABLET, FILM COATED, EXTENDED RELEASE ORAL at 15:58

## 2021-08-08 RX ADMIN — POTASSIUM CHLORIDE 20 MEQ: 1500 TABLET, EXTENDED RELEASE ORAL at 05:23

## 2021-08-08 RX ADMIN — SODIUM CHLORIDE, POTASSIUM CHLORIDE, SODIUM LACTATE AND CALCIUM CHLORIDE: 600; 310; 30; 20 INJECTION, SOLUTION INTRAVENOUS at 11:14

## 2021-08-08 RX ADMIN — BUPROPION HYDROCHLORIDE 150 MG: 150 TABLET, EXTENDED RELEASE ORAL at 12:01

## 2021-08-08 RX ADMIN — POTASSIUM CHLORIDE 10 MEQ: 7.46 INJECTION, SOLUTION INTRAVENOUS at 06:15

## 2021-08-08 RX ADMIN — SODIUM CHLORIDE, POTASSIUM CHLORIDE, SODIUM LACTATE AND CALCIUM CHLORIDE: 600; 310; 30; 20 INJECTION, SOLUTION INTRAVENOUS at 00:38

## 2021-08-08 RX ADMIN — POTASSIUM CHLORIDE 10 MEQ: 7.46 INJECTION, SOLUTION INTRAVENOUS at 02:15

## 2021-08-08 RX ADMIN — ESCITALOPRAM OXALATE 20 MG: 10 TABLET, FILM COATED ORAL at 12:00

## 2021-08-08 RX ADMIN — MAGNESIUM SULFATE HEPTAHYDRATE 2000 MG: 40 INJECTION, SOLUTION INTRAVENOUS at 00:39

## 2021-08-08 RX ADMIN — POTASSIUM CHLORIDE 20 MEQ: 1500 TABLET, EXTENDED RELEASE ORAL at 01:13

## 2021-08-08 ASSESSMENT — PAIN SCALES - GENERAL
PAINLEVEL_OUTOF10: 0

## 2021-08-08 ASSESSMENT — PAIN DESCRIPTION - ONSET: ONSET: UNABLE TO ASSESS

## 2021-08-08 NOTE — PROGRESS NOTES
S: Pt is feeling well. Babies are active. No contractions, bleeding or loss of fluid. O:   Vitals:    21 0254 21 0347 21 0727 21 0850   BP: 126/68 114/61 139/70 135/79   Pulse: 94 86 92 95   Resp: 16 17 16 17   Temp: 98.5 °F (36.9 °C)      TempSrc: Oral      SpO2: 100% 99% 99%    Weight:       Height:         Gen: NAD  Abd: soft, gravid, non-tender  Ext: non-tender no edema    Lab Results   Component Value Date     2021    K 2.9 (LL) 2021     2021    CO2 25 2021    BUN 4 (L) 2021    CREATININE 0.4 (L) 2021    GLUCOSE 115 (H) 2021    CALCIUM 8.6 2021    PROT 6.4 2021    LABALBU 3.9 2021    BILITOT 0.1 2021    ALKPHOS 120 2021    AST 10 (L) 2021    ALT 6 (L) 2021    LABGLOM >60 2021    GFRAA >60 2021       Lab Results   Component Value Date    MG 1.7 2021     32 yo [id number] @ 29w0d    1. Hypokalemia: KCL is being infused now, and we will repeat labs this afternoon. 2. Hypomagnesemia: s/p replacement. Will recheck this afternoon. 3. Preeclampsia: will monitor BPs  4. Di/di twins: daily NSTs while in house  5. Hx of  section: planning repeat for delivery  6.  Hx of palpitations: pt to follow up with her cardiologist.

## 2021-08-08 NOTE — CONSULTS
Patient:   Santi Ruggiero    Date:  21  :  1995, 31 y.o. Nephrologist: Will Alvares MD  Provider: Marino Landau, APRN - CNP    Reason for Consult: Hypokalemia hypomagnesemia and potential preeclampsia  Consult requested by; Jt Geiger MD      Chief Complaint:   Hypokalemia, hypomagnesemia and elevated blood pressure    HISTORY OF PRESENT ILLNESS:   69-year-old female who is 29 weeks pregnant with twin, presented for her electrolyte check, she has been coming to the triage for letter getting replacement of magnesium and potassium, she also getting blood pressure check-when she found to have a low potassium she was admitted for mainly potassium and magnesium repletion. She does have prior to pregnancy complicated by preeclampsia-considering delivery at 37 weeks of pregnancy-she reported after her first pregnancy she needed a month of antihypertensive medication, and after her second pregnancy she needed 6 to 8 months of antihypertensive-she is not any blood pressure medication at this time. She also reported several episodes of diarrhea-but denied taking any NSAIDs including over-the-counter. She reported home blood pressure could be had as 180/90.   She is rather anxious, and little frustrated  On my exam 1520 minutes earlier, her sitting right brachial pressure 150 155/9095, her heart rate is in 90s, had some peripheral edema-recent spot protein creatinine ratio had about 300 mg/day of proteinuria-but dipstick urine showed no albumin,-suggestive of normal albumin uric proteinuria-her platelet count was normal, hemoglobin more than 10 g/dL, uric acid 4 mg/dL, most current potassium 3.1 mEq/L,, magnesium 1.7 mg/dL,  Serum bicarbonate 26, with appropriately lower creatinine of 0.4 mg/dL and BUN of only 3 mg/dL      Past medical history    2 preeclampsia during her first and second pregnancy she had a miscarriage  She had asthma  She reported history of endometriosis-  And at least one episode of kidney stone during pregnancy-think she passed spontaneously    OB/GYN history   4 para 2 miscarriage 1  She is 37 weeks pregnant with twin   To episode of prior preeclampsia    Habits  She smokes 15 cigarettes/day doing it for the last 9 years  She is a social drinker but not drinking alcohol while pregnant  No history of illicit drug abuse    Family medical history  She is not ARF any chronic disease in her family    Past surgical history   x2  Intervention for endometriosis    Social history  Patient was born in [facility] but residing here in [facility]  She works in [employer] and family [employer]. here in [facility]  She of course has 2 children, she is not   She has a mom and family here in [facility]        REVIEW OF SYSTEMS:     All pertinent ROS neg except   Very anxious, stressful and overwhelmed  Lower extremity edema-no headache or chest pain at this time    Current Facility-Administered Medications   Medication Dose Route Frequency Provider Last Rate Last Admin    buPROPion (WELLBUTRIN XL) extended release tablet 150 mg  150 mg Oral Daily Shantell Lema MD   150 mg at 21 1201    escitalopram (LEXAPRO) tablet 20 mg  20 mg Oral Daily Shantell Lema MD   20 mg at 21 1200    magnesium sulfate 2000 mg in 50 mL IVPB premix  2,000 mg Intravenous Q2H Shantell Lema MD        potassium chloride 10 mEq/100 mL IVPB (Peripheral Line)  10 mEq Intravenous Q1H Shantell Lema MD        potassium chloride (KLOR-CON M) extended release tablet 40 mEq  40 mEq Oral TID  Joseph Parada MD        lactated ringers infusion   Intravenous Continuous Shantell Lema  mL/hr at 21 1114 New Bag at 21 1114    ondansetron (ZOFRAN-ODT) disintegrating tablet 4 mg  4 mg Oral Q8H PRN Shantell Lema MD        Or    ondansetron [facility]) injection 4 mg  4 mg Intravenous Q6H PRN Shantell Lema MD   4 mg at 21 0118    acetaminophen (TYLENOL) tablet 650 mg  650 mg Oral Q4H PRN Shantell Ambrose MD           BP (!) 144/78   Pulse 97   Temp 98.6 °F (37 °C)   Resp 16   Ht 5' 3\" (1.6 m)   Wt 241 lb (109.3 kg)   LMP 01/17/2021   SpO2 99%   BMI 42.69 kg/m²     PHYSICAL EXAM:  General appearance: alert, awake and oriented-little anxious  Head: Normocephalic, without obvious abnormality, atraumatic, 1+ conjunctival pallor  Neck:  supple  Heart: regular rate and rhythm, S1, S2-systolic ejection murmur  LUNGS: clear to auscultation bilateral  Abdomen: Reviewed abdomen  Extremities: Mainly pedal and ankle  edema    LABS:  CBC:   Lab Results   Component Value Date    WBC 13.4 08/03/2021    WBC 14.9 08/02/2021    WBC 9.7 05/06/2021    HGB 10.6 08/03/2021    HGB 10.6 08/02/2021    HGB 10.9 05/06/2021     08/03/2021     08/02/2021     05/06/2021     Renal Panel:   Lab Results   Component Value Date     08/08/2021     08/07/2021     08/06/2021    K 3.1 08/08/2021    K 2.9 08/07/2021    K 2.9 08/06/2021     08/08/2021     08/07/2021     08/06/2021    CO2 26 08/08/2021    CO2 25 08/07/2021    CO2 23 08/06/2021    BUN 3 08/08/2021    BUN 4 08/07/2021    BUN 4 08/06/2021    CREATININE 0.4 08/08/2021    CREATININE 0.4 08/07/2021    CREATININE 0.4 08/06/2021    GFRAA >60 08/08/2021    GFRAA >60 08/07/2021    GFRAA >60 08/06/2021    LABGLOM >60 08/08/2021    LABGLOM >60 08/07/2021    LABGLOM >60 08/06/2021    LABALBU 3.6 08/08/2021    LABALBU 3.9 08/07/2021    LABALBU 3.4 08/05/2021         Calcium:    Lab Results   Component Value Date    CALCIUM 8.4 08/08/2021     Phosphorus:  No results found for: PHOS    U/A:    Lab Results   Component Value Date    PROTEINU NEGATIVE 08/05/2021    NITRU NEGATIVE 08/05/2021    NITRU NEGATIVE 08/29/2013    COLORU YELLOW 08/05/2021    PHUR 7.5 02/05/2014    WBCUA 5 08/05/2021    RBCUA <1 08/05/2021    MUCUS RARE 08/02/2021    TRICHOMONAS NONE SEEN 08/05/2021    YEAST MANY 05/05/2021    BACTERIA OCCASIONAL 08/05/2021    CLARITYU CLEAR 08/05/2021    SPECGRAV 1.010 08/05/2021    UROBILINOGEN NEGATIVE 08/05/2021    BILIRUBINUR NEGATIVE 08/05/2021    BILIRUBINUR neg 02/05/2014    BLOODU NEGATIVE 08/05/2021    GLUCOSEU neg 02/05/2014    KETUA NEGATIVE 08/05/2021    AMORPHOUS RARE 08/02/2021           IMPRESSION:  1. Hypokalemia and hypomagnesemia-she had some diarrhea which could be the source of GI loss-she also may have poor total body store-as both are intracellular cation-I wonder whether she has high aldosterone and renin level causing renal loss too. Finally if she has alkalosis that can cause some cellular shift and hyperkalemia i.e. potassium will shift inside the cell and causing lower extracellular potassium, but she did not look that tachypneic to me  2. She met the criteria for preeclampsia-as the risk is really high with 2 previous pregnancy complicated by preeclampsia  3. Underlying asthma and endometriosis-as well as history of kidney stone        PLAN:  1. Replete potassium and magnesium, as I suspect she might have poor total body store-we need to restore the intracellular store-roughly she need about 360 mEq of potassium-to get the serum level close to 4 mEq/L-assuming she does not have any other GI or renal loss-magnesium can be repleted intravenously-I will probably put her 40 mEq p.o. potassium chloride 3 times a day for at least-3 days-she can take at home-we could probably load her up with IV magnesium here in the hospital.  I would redo the UA, urine sodium and potassium just to make sure there is no renal loss, get some biochemical testing tomorrow morning, along with spot protein creatinine ratio. I will talk to Dr. Rosa M Gu about putting her on some antihypertensive medication-either beta-blocker or calcium channel blocker, so her blood pressure does not go extremely high. Of course I will only do it after consultation with Dr. Rosa M Gu.   I will give her a call soon. Follow clinically. Patient is overwhelmed stressful and crying, me along with the nurse try to console her. She agreed to stay overnight, also needed some assurance from now that she is not going to be sick at home.   Also of course very concerned about her fetus        INSTRUCTIONS:

## 2021-08-08 NOTE — FLOWSHEET NOTE
Telephone Dr. Saleem Davis to discuss orders. Orders received. Made aware of most recent trends in blood pressures.

## 2021-08-08 NOTE — FLOWSHEET NOTE
IV removed from right wrist per patient request. Pt reports discomfort at IV site. No signs of infiltration noted.

## 2021-08-08 NOTE — FLOWSHEET NOTE
Telephone Dr. Tono Spaulding regarding patient request for her home dosages of her anxiety/depression medication. Orders received.

## 2021-08-08 NOTE — FLOWSHEET NOTE
Pt presents to [facility] for potassium infusion due to hypokalemia. Pt to LD03. Reviewed plan of care. Pt voices understanding.

## 2021-08-08 NOTE — FLOWSHEET NOTE
Pt resting in a position of comfort and talking on her cellphone. Denies any needs or concerns at this time. Call light within reach and bed in lowest position.

## 2021-08-08 NOTE — H&P
Department of Obstetrics and Gynecology   Obstetrics History and Physical        CHIEF COMPLAINT: Electrolyte abnormalities      HISTORY OF PRESENT ILLNESS:      The patient is a 31 y.o. female at 32w0d. OB History        4    Para   2    Term   2            AB   1    Living   2       SAB   1    TAB        Ectopic        Molar        Multiple   0    Live Births   2            Patient presents with a chief complaint as above and is being admitted for observation. She has had hypokalemia since 20. Repeat labs have persistently been abnormal. She has returned multiple times for treatment and/or repeat labs. However, until tonight she has been unable to stay for the results, treatment and/or repeat labs after treatment. Her pregnancy is complicated by Di/di twins, preeclampsia, hx of preeclampsia in prior pregnancies, and history of  section x2.      Estimated Due Date: Estimated Date of Delivery: 10/24/21    PRENATAL CARE:    Complicated by: see HPI    PAST OB HISTORY  OB History        4    Para   2    Term   2            AB   1    Living   2       SAB   1    TAB        Ectopic        Molar        Multiple   0    Live Births   2                Past Medical History:        Diagnosis Date    Anxiety disorder     Asthma     Depression 2015    Kidney stones      Past Surgical History:        Procedure Laterality Date     SECTION       SECTION N/A 2020     SECTION performed by Geremias Knight MD at [facility] L[facility] OR    CYSTOSCOPY Right 2021    CYSTOSCOPY, RIGHT URETEROSCOPY performed by Dwight Anguiano MD at [facility] OR     Allergies:  Lulla Cord and Silver  Social History:    Social History     Socioeconomic History    Marital status: Single     Spouse name: Not on file    Number of children: Not on file    Years of education: Not on file    Highest education level: Not on file   Occupational History    Not on file   Tobacco Use    Smoking as needed for Wheezing or Shortness of Breath With spacer (and mask if indicated). Thanks. albuterol sulfate  (90 Base) MCG/ACT inhaler, INHALE 2 PUFFS PO EVERY 4-6 HOURS PRN  Prenatal MV-Min-Fe Fum-FA-DHA (PRENATAL 1) 30-0.975-200 MG CAPS, Take 1 tablet by mouth daily    REVIEW OF SYSTEMS:    CONSTITUTIONAL:  negative  RESPIRATORY:  negative  CARDIOVASCULAR:  negative  GASTROINTESTINAL:  negative  ALLERGIC/IMMUNOLOGIC:  negative  NEUROLOGICAL:  negative  BEHAVIOR/PSYCH:  negative    PHYSICAL EXAM:  Blood pressure (!) 150/84, pulse 100, resp. rate 18, last menstrual period 2021, unknown if currently breastfeeding. General appearance:  awake, alert, cooperative, no apparent distress, and appears stated age  Neurologic:  Awake, alert, oriented to name, place and time. Lungs:  No increased work of breathing, good air exchange  Abdomen:  Soft, non tender, gravid, consistent with her gestational age,     Membranes:  Intact    Lab Results   Component Value Date     2021    K 2.9 2021     2021    CO2 25 2021    BUN 4 2021    CREATININE 0.4 2021    GLUCOSE 115 2021    CALCIUM 8.6 2021      Lab Results   Component Value Date    MG 1.7 2021         ASSESSMENT AND PLAN:    30 yo  @ 29w0d  1. Hypokalemia: will replace with oral and IV KCl and repeat labs in the am.   2. Hypomagnesemia: will replace and repeat labs in the am.  3. Preeclampsia: will monitor BPs  4. Di/di twins  5. Hx of  section: planning repeat   6. Hx of palpitations: pt reports having a recent cardia work up for this and she has not followed up to discuss the results/treatment plan. Advised her to call for follow up next wk. Discussed consultation with MFM and/or nephrology for electrolyte disturbances in the setting of early onset preeclampsia. Advised pt to call her OB Monday morning with the events of the weekend and to inquire about referrals.      Shantell MILLIGAN Lawson Sanon MD

## 2021-08-08 NOTE — FLOWSHEET NOTE
Urine specimen collected and sent to lab. Ice water provided to patient. Addressed all questions and concerns patient has at this time. Call light within reach and bed in lowest position.

## 2021-08-08 NOTE — PROGRESS NOTES
Discussed with Dr. Jalil Hubbard  We both agreed to put her on a small dose of calcium channel blocker  We will start extended release nifedipine 30 mg daily  Since she is here inpatient we can closely monitor her blood pressure manually.   See how she does

## 2021-08-08 NOTE — PLAN OF CARE
Problem: SAFETY  Goal: Free from accidental physical injury  8/8/2021 0805 by Debi Anne RN  Outcome: Ongoing  8/8/2021 0258 by Regulo Hernández RN  Outcome: Ongoing     Problem: DAILY CARE  Goal: Daily care needs are met  8/8/2021 0805 by Debi Anne RN  Outcome: Ongoing  8/8/2021 0258 by Regulo Hernández RN  Outcome: Ongoing     Problem: PAIN  Goal: Patient's pain/discomfort is manageable  8/8/2021 0805 by Debi Anne RN  Outcome: Ongoing  8/8/2021 0258 by Regulo Hernández RN  Outcome: Ongoing

## 2021-08-09 VITALS
RESPIRATION RATE: 20 BRPM | HEIGHT: 63 IN | OXYGEN SATURATION: 97 % | BODY MASS INDEX: 42.7 KG/M2 | WEIGHT: 241 LBS | HEART RATE: 89 BPM | SYSTOLIC BLOOD PRESSURE: 154 MMHG | DIASTOLIC BLOOD PRESSURE: 82 MMHG | TEMPERATURE: 98.6 F

## 2021-08-09 DIAGNOSIS — O14.92 PRE-ECLAMPSIA IN SECOND TRIMESTER: Primary | ICD-10-CM

## 2021-08-09 LAB
ALBUMIN SERPL-MCNC: 3.4 GM/DL (ref 3.4–5)
ALP BLD-CCNC: 119 IU/L (ref 40–128)
ALT SERPL-CCNC: 37 U/L (ref 10–40)
ANION GAP SERPL CALCULATED.3IONS-SCNC: 9 MMOL/L (ref 4–16)
AST SERPL-CCNC: 66 IU/L (ref 15–37)
BASOPHILS ABSOLUTE: 0.1 K/CU MM
BASOPHILS RELATIVE PERCENT: 0.4 % (ref 0–1)
BILIRUB SERPL-MCNC: 0.2 MG/DL (ref 0–1)
BUN BLDV-MCNC: 3 MG/DL (ref 6–23)
CALCIUM SERPL-MCNC: 8.6 MG/DL (ref 8.3–10.6)
CHLORIDE BLD-SCNC: 104 MMOL/L (ref 99–110)
CO2: 26 MMOL/L (ref 21–32)
CREAT SERPL-MCNC: 0.2 MG/DL (ref 0.6–1.1)
DIFFERENTIAL TYPE: ABNORMAL
EOSINOPHILS ABSOLUTE: 0.2 K/CU MM
EOSINOPHILS RELATIVE PERCENT: 1.2 % (ref 0–3)
GFR AFRICAN AMERICAN: >60 ML/MIN/1.73M2
GFR NON-AFRICAN AMERICAN: >60 ML/MIN/1.73M2
GLUCOSE BLD-MCNC: 78 MG/DL (ref 70–99)
HCT VFR BLD CALC: 30.4 % (ref 37–47)
HEMOGLOBIN: 10.1 GM/DL (ref 12.5–16)
IMMATURE NEUTROPHIL %: 1.3 % (ref 0–0.43)
LYMPHOCYTES ABSOLUTE: 2.1 K/CU MM
LYMPHOCYTES RELATIVE PERCENT: 14.9 % (ref 24–44)
MAGNESIUM: 1.9 MG/DL (ref 1.8–2.4)
MCH RBC QN AUTO: 29.1 PG (ref 27–31)
MCHC RBC AUTO-ENTMCNC: 33.2 % (ref 32–36)
MCV RBC AUTO: 87.6 FL (ref 78–100)
MONOCYTES ABSOLUTE: 1.2 K/CU MM
MONOCYTES RELATIVE PERCENT: 8.2 % (ref 0–4)
NUCLEATED RBC %: 0 %
PDW BLD-RTO: 15.5 % (ref 11.7–14.9)
PHOSPHORUS: 3.1 MG/DL (ref 2.5–4.9)
PLATELET # BLD: 334 K/CU MM (ref 140–440)
PMV BLD AUTO: 9.3 FL (ref 7.5–11.1)
POTASSIUM SERPL-SCNC: 3.2 MMOL/L (ref 3.5–5.1)
RBC # BLD: 3.47 M/CU MM (ref 4.2–5.4)
SEGMENTED NEUTROPHILS ABSOLUTE COUNT: 10.5 K/CU MM
SEGMENTED NEUTROPHILS RELATIVE PERCENT: 74 % (ref 36–66)
SODIUM BLD-SCNC: 139 MMOL/L (ref 135–145)
TOTAL IMMATURE NEUTOROPHIL: 0.19 K/CU MM
TOTAL NUCLEATED RBC: 0 K/CU MM
TOTAL PROTEIN: 5.9 GM/DL (ref 6.4–8.2)
WBC # BLD: 14.2 K/CU MM (ref 4–10.5)

## 2021-08-09 PROCEDURE — G0378 HOSPITAL OBSERVATION PER HR: HCPCS

## 2021-08-09 PROCEDURE — 36415 COLL VENOUS BLD VENIPUNCTURE: CPT

## 2021-08-09 PROCEDURE — 99217 PR OBSERVATION CARE DISCHARGE MANAGEMENT: CPT | Performed by: OBSTETRICS & GYNECOLOGY

## 2021-08-09 PROCEDURE — 6370000000 HC RX 637 (ALT 250 FOR IP): Performed by: OBSTETRICS & GYNECOLOGY

## 2021-08-09 PROCEDURE — 84100 ASSAY OF PHOSPHORUS: CPT

## 2021-08-09 PROCEDURE — 80053 COMPREHEN METABOLIC PANEL: CPT

## 2021-08-09 PROCEDURE — 85025 COMPLETE CBC W/AUTO DIFF WBC: CPT

## 2021-08-09 PROCEDURE — 82088 ASSAY OF ALDOSTERONE: CPT

## 2021-08-09 PROCEDURE — 93005 ELECTROCARDIOGRAM TRACING: CPT | Performed by: OBSTETRICS & GYNECOLOGY

## 2021-08-09 PROCEDURE — 83735 ASSAY OF MAGNESIUM: CPT

## 2021-08-09 PROCEDURE — 84244 ASSAY OF RENIN: CPT

## 2021-08-09 RX ORDER — SODIUM CHLORIDE, SODIUM LACTATE, POTASSIUM CHLORIDE, CALCIUM CHLORIDE 600; 310; 30; 20 MG/100ML; MG/100ML; MG/100ML; MG/100ML
INJECTION, SOLUTION INTRAVENOUS CONTINUOUS
Status: DISCONTINUED | OUTPATIENT
Start: 2021-08-09 | End: 2021-08-09 | Stop reason: HOSPADM

## 2021-08-09 RX ORDER — SODIUM CHLORIDE 0.9 % (FLUSH) 0.9 %
5-40 SYRINGE (ML) INJECTION PRN
Status: DISCONTINUED | OUTPATIENT
Start: 2021-08-09 | End: 2021-08-09 | Stop reason: HOSPADM

## 2021-08-09 RX ORDER — SODIUM CHLORIDE 9 MG/ML
25 INJECTION, SOLUTION INTRAVENOUS PRN
Status: DISCONTINUED | OUTPATIENT
Start: 2021-08-09 | End: 2021-08-09 | Stop reason: HOSPADM

## 2021-08-09 RX ORDER — CALCIUM GLUCONATE 94 MG/ML
1000 INJECTION, SOLUTION INTRAVENOUS PRN
Status: DISCONTINUED | OUTPATIENT
Start: 2021-08-09 | End: 2021-08-09 | Stop reason: HOSPADM

## 2021-08-09 RX ORDER — MAGNESIUM SULFATE 4 G/50ML
4000 INJECTION INTRAVENOUS ONCE
Status: DISCONTINUED | OUTPATIENT
Start: 2021-08-09 | End: 2021-08-09 | Stop reason: HOSPADM

## 2021-08-09 RX ORDER — NICOTINE 21 MG/24HR
1 PATCH, TRANSDERMAL 24 HOURS TRANSDERMAL DAILY
Status: DISCONTINUED | OUTPATIENT
Start: 2021-08-09 | End: 2021-08-09 | Stop reason: HOSPADM

## 2021-08-09 RX ORDER — SODIUM CHLORIDE 0.9 % (FLUSH) 0.9 %
5-40 SYRINGE (ML) INJECTION EVERY 12 HOURS SCHEDULED
Status: DISCONTINUED | OUTPATIENT
Start: 2021-08-09 | End: 2021-08-09 | Stop reason: HOSPADM

## 2021-08-09 RX ADMIN — ACETAMINOPHEN 650 MG: 325 TABLET ORAL at 06:40

## 2021-08-09 RX ADMIN — DIPHENHYDRAMINE HYDROCHLORIDE 50 MG: 25 TABLET ORAL at 00:18

## 2021-08-09 ASSESSMENT — PAIN DESCRIPTION - LOCATION: LOCATION: HEAD

## 2021-08-09 ASSESSMENT — PAIN - FUNCTIONAL ASSESSMENT: PAIN_FUNCTIONAL_ASSESSMENT: ACTIVITIES ARE NOT PREVENTED

## 2021-08-09 ASSESSMENT — PAIN SCALES - GENERAL
PAINLEVEL_OUTOF10: 0
PAINLEVEL_OUTOF10: 3
PAINLEVEL_OUTOF10: 3

## 2021-08-09 ASSESSMENT — PAIN DESCRIPTION - ORIENTATION: ORIENTATION: MID

## 2021-08-09 ASSESSMENT — PAIN DESCRIPTION - PROGRESSION: CLINICAL_PROGRESSION: GRADUALLY WORSENING

## 2021-08-09 ASSESSMENT — PAIN DESCRIPTION - FREQUENCY: FREQUENCY: CONTINUOUS

## 2021-08-09 ASSESSMENT — PAIN DESCRIPTION - PAIN TYPE: TYPE: ACUTE PAIN

## 2021-08-09 ASSESSMENT — PAIN DESCRIPTION - DESCRIPTORS: DESCRIPTORS: ACHING

## 2021-08-09 ASSESSMENT — PAIN DESCRIPTION - ONSET: ONSET: GRADUAL

## 2021-08-09 NOTE — PROGRESS NOTES
Nephrology Progress Note  8/9/2021 9:30 AM        Subjective:   Admit Date: 8/7/2021  PCP: MARGARITO Alcantara - CNP    Interval History: Tolerating small dose of long-acting calcium channel blocker, blood pressure still 140 150/90    Diet: Reasonable    ROS: No shortness of breath, anxious    Data:     Current meds:    sodium chloride flush  5-40 mL Intravenous 2 times per day    magnesium sulfate  4,000 mg Intravenous Once    nicotine  1 patch Transdermal Daily    escitalopram  20 mg Oral Daily    potassium chloride  40 mEq Oral TID WC    NIFEdipine  30 mg Oral Daily      lactated ringers      sodium chloride      magnesium sulfate      lactated ringers 125 mL/hr at 08/08/21 1114         I/O last 3 completed shifts: In: 5643.3 [P.O.:4180; I.V.:1231.3; IV Piggyback:232.1]  Out: 6500 [Urine:6500]    CBC:   Recent Labs     08/09/21  0604   WBC 14.2*   HGB 10.1*             Recent Labs     08/07/21  1350 08/08/21  1259 08/09/21  0604    136 139   K 2.9* 3.1* 3.2*    103 104   CO2 25 26 26   BUN 4* 3* 3*   CREATININE 0.4* 0.4* 0.2*   GLUCOSE 115* 93 78       Lab Results   Component Value Date    CALCIUM 8.6 08/09/2021    PHOS 3.1 08/09/2021       Objective:     Vitals: BP (!) 154/82   Pulse 89   Temp 98.8 °F (37.1 °C)   Resp 20   Ht 5' 3\" (1.6 m)   Wt 241 lb (109.3 kg)   LMP 01/17/2021   SpO2 97%   BMI 42.69 kg/m²     General appearance: No acute distress  HEENT: No gross conjunctival pallor  Neck: Supple  Lungs: No gross crackles on auscultation of posterior lung   heart: Regular rate and rhythm with early mild systolic ejection murmur  Abdomen: Gravid abdomen  Extremities: 1+ edema      Problem List :         Impression :     1. Preeclampsia-blood pressure acceptable but may need better control.-With  prior history of preeclampsia and twin pregnancy-she is of course in high risk category-  2.  Hypokalemia-there is no renal loss, I suspect mainly from GI loss-as she had

## 2021-08-09 NOTE — DISCHARGE SUMMARY
See my progress note this AM    Condition stable (mother and fetus x 2)    Date of admission: 21  Date of discharge/transfer to [facility] 21          The patient is a 31 y.o. female at 28w2d with preeclampsia. Deneis headache/scotoma/ruq pain. Reports worsening swelling. Active fetal movement x 3. No uterine contractions/vaginal bleeding/leakage of fluid. Does report anxiety and intermittent racing heart.   She has seen cardiology in the past.                            OB History                           4              Para       2              Term       2                                    AB       1              Living       2                        SAB       1              TAB                      Ectopic                      Molar                      Multiple       0              Live Births       2                                Estimated Due Date: Estimated Date of Delivery: 10/24/21         PRENATAL CARE:         Complicated by: di/di twins and preeclampsia         PAST OB HISTORY               OB History                           4              Para       2              Term       2                                    AB       1              Living       2                        SAB       1              TAB                      Ectopic                      Molar                      Multiple       0              Live Births       2                                     Past Medical History:      Past Medical History                                                                                                                                           Past Surgical History:      Past Surgical History                                                                                                                                         Allergies:  Gold and Silver    Social History:          Social History Family History:     Family History                                                                                           Medications Prior to Admission:        Prescriptions Prior to Admission                                           REVIEW OF SYSTEMS:         CONSTITUTIONAL:  negative    RESPIRATORY:  negative    CARDIOVASCULAR:  negative    GASTROINTESTINAL:  negative    ALLERGIC/IMMUNOLOGIC:  negative    NEUROLOGICAL:  negative    BEHAVIOR/PSYCH:  negative         PHYSICAL EXAM:    Blood pressure (!) 150/70, pulse 93, temperature 98.8 °F (37.1 °C), resp. rate 19, height 5' 3\" (1.6 m), weight 241 lb (109.3 kg), last menstrual period 01/17/2021, SpO2 97 %, unknown if currently breastfeeding.          08/09/21 0727           --           19           93           150/70 untitled image           --           Supine           --           --           --           --           --           --           --                   BP: Dr Ryan Martinez aware of B/P at 08/09/21 0727 08/09/21 0641           --           --           89           137/74           --           --           --           --           97           --           --           --           --             08/09/21 0640           --           --           --           --           -- --           --           --           --           --           3           --           --             08/09/21 0600           --           --           --           --           --           --           --           --           --           --           3           --           --             08/09/21 0400           --           --           --           --           --           --           --           --           --           --           0           --           --             08/09/21 0351           98.8 (37.1)           20           101           147/89           --           Sitting           --           --           99           --           --           --           --             08/09/21 0100           --           --           --           --           --           --           --           --           --           --           0           --           --             08/09/21 0028           98 (36.7)           18           92           156/94           --           Sitting           --           --           --           --           2           --           --             08/09/21 0020           --           --           90           159/96           --           --           --           --           --           --           --           --           --             08/08/21 2300           --           --           --           --           --           --           --           --           --           --           0           --           --             08/08/21 2200           --           --           --           --           --           --           --           --           --           --           0           --           --             08/08/21 2100           --           --           --           --           --           --           --           --           --           --           0           --           --             08/08/21 2033           98 (36.7)           18 90           145/87           --           Sitting           --           --           --           --           --           --           --             08/08/21 2003           --           --           91           161/89           --           --           --           --           --           --           --           --           --             08/08/21 2000           --           --           --           --           --           --           --           --           --           --           0           --           --             08/08/21 1933           --           --           85           164/89           --           --           --           --           --           --           --           --           --             08/08/21 1903           --           --           83           148/82           --           --           --           --           --           --           --           --           --             08/08/21 1833           --           17           89           142/76           --           Semi fowlers           --           --           --           --           --           --           --             08/08/21 1803           --           15           85           142/86           --           Sitting           --           --           --           --           0           --           --             08/08/21 1732           --           16           93           149/78           --           Semi fowlers           --           --           --           --           0           --           --             08/08/21 1703           --           17           89           151/82           --           Semi fowlers           --           --           --           --           --           --           --             08/08/21 1633           --           16           93           142/79           --           Semi fowlers           --           --           --           --           -- --           --             08/08/21 1604           98.5 (36.9)           17           95           157/84           --           Semi fowlers           --           --           --           --           0           --           --             08/08/21 1551           --           16           89           165/89           --           Sitting           --           --           --           --           --           --           --             08/08/21 1513           --           17           90           155/82           --           Sitting           --           --           --           --           0           --           --             08/08/21 1319           98.6 (37)           16           97           144/78           --           Semi fowlers           --           --           --           --           0           --           --             08/08/21 1205           --           17           99           155/84           --           Sitting                                                                                                     General appearance:  awake, alert, cooperative, no apparent distress, and appears stated age    Neurologic:  Awake, alert, oriented to name, place and time.       Lungs:  No increased work of breathing, good air exchange    Heart:  Regular rate and rhythm    Abdomen:  Soft, non tender, gravid, consistent with her gestational age    Fetal heart rate:   Category 1 x 2     Contraction frequency:  none     Extremities:  Edema bilateral 3+, 2+ DTRs, no clonus    Membranes:  Intact                    Collected       Updated       Procedure                 08/09/2021 0604     08/09/2021 0645         Magnesium [[id number]]       Blood              Component     Value     Units       Magnesium     1.9     mg/dl                                08/09/2021 0604     08/09/2021 0645         Phosphorus [[id number]]       Blood              Component     Value     Units Immature Neutrophil %     1.3High      %                                08/09/2021 0604     08/09/2021 0604         Aldosterone, Serum [[id number]]       Blood              Component     Value       No component results                                08/08/2021 1525     08/08/2021 1606         Protein / creatinine ratio, urine [[id number]]       (Abnormal)       Urine, clean catch              Component     Value     Units       Urine Total Protein     7.8     MG/DL       Creatinine, Ur     23.2Low      MG/DL       Prot/Creat Ratio, Ur     0.3High                                             ECG: normal sinus rhythm, rate of 88              ASSESSMENT AND PLAN:    Dichorionic/Diamniotic twins at 29w1d    1. Preeclampsia (24 hour urine protein on 7/22/21 480mg) now with trending toward severe features (BPs as above despite starting procardia xl 30mg daily yesterday and elevated AST this AM)    -celestone received on 8/5 and 8/6    -begin magnesium sulfate for seizure prophylaxis and fetal neuroprotection    -transfer to [place] for level III NICU and MFM services. 2.  Hypokalemia and Hyomagnesemia    -post replacement are normal at this point, see nephrology notes         3. Heart flutters this morning, normal ECG, has seen cardiology in the past, does have a history of anxiety.       -will continue lexapro    -will stop wellbutrin (lowers seizure threshold and can make anxiety worse)    -continue to monitor anxiety and cardiac symptoms

## 2021-08-09 NOTE — PROGRESS NOTES
Substance and Sexual Activity    Alcohol use: Not Currently    Drug use: No    Sexual activity: Yes     Partners: Male   Other Topics Concern    Not on file   Social History Narrative    Not on file     Social Determinants of Health     Financial Resource Strain: High Risk    Difficulty of Paying Living Expenses: Hard   Food Insecurity: No Food Insecurity    Worried About Running Out of Food in the Last Year: Never true    [place] of Food in the Last Year: Never true   Transportation Needs:     Lack of Transportation (Medical):      Lack of Transportation (Non-Medical):    Physical Activity:     Days of Exercise per Week:     Minutes of Exercise per Session:    Stress:     Feeling of Stress :    Social Connections:     Frequency of Communication with Friends and Family:     Frequency of Social Gatherings with Friends and Family:     Attends Cheondoism Services:     Active Member of Clubs or Organizations:     Attends Club or Organization Meetings:     Marital Status:    Intimate Partner Violence:     Fear of Current or Ex-Partner:     Emotionally Abused:     Physically Abused:     Sexually Abused:      Family History:       Problem Relation Age of Onset    Cancer Other     Diabetes Other      Medications Prior to Admission:  Medications Prior to Admission: escitalopram (LEXAPRO) 20 MG tablet, Take 0.5 tablets by mouth daily  buPROPion (WELLBUTRIN XL) 150 MG extended release tablet, Take 150 mg by mouth every morning  aspirin 81 MG chewable tablet, Take 81 mg by mouth daily  albuterol sulfate  (90 Base) MCG/ACT inhaler, INHALE 2 PUFFS PO EVERY 4-6 HOURS PRN  Prenatal MV-Min-Fe Fum-FA-DHA (PRENATAL 1) 30-0.975-200 MG CAPS, Take 1 tablet by mouth daily  hydrOXYzine (VISTARIL) 25 MG capsule, Take 1 capsule by mouth 4 times daily as needed for Anxiety  albuterol sulfate HFA (PROVENTIL HFA) 108 (90 Base) MCG/ACT inhaler, Inhale 2 puffs into the lungs every 4 hours as needed for Wheezing or Shortness of Breath With spacer (and mask if indicated). Thanks. REVIEW OF SYSTEMS:    CONSTITUTIONAL:  negative  RESPIRATORY:  negative  CARDIOVASCULAR:  negative  GASTROINTESTINAL:  negative  ALLERGIC/IMMUNOLOGIC:  negative  NEUROLOGICAL:  negative  BEHAVIOR/PSYCH:  negative    PHYSICAL EXAM:  Blood pressure (!) 150/70, pulse 93, temperature 98.8 °F (37.1 °C), resp. rate 19, height 5' 3\" (1.6 m), weight 241 lb (109.3 kg), last menstrual period 01/17/2021, SpO2 97 %, unknown if currently breastfeeding.    08/09/21 0727  --  19  93  150/70   --  Supine  --  --  --  --  --  --  --      BP: Dr Angeles Abdi aware of B/P at 08/09/21 0727 08/09/21 0641  --  --  89  137/74  --  --  --  --  97  --  --  --  --     08/09/21 0640  --  --  --  --  --  --  --  --  --  --  3  --  --     08/09/21 0600  --  --  --  --  --  --  --  --  --  --  3  --  --     08/09/21 0400  --  --  --  --  --  --  --  --  --  --  0  --  --     08/09/21 0351  98.8 (37.1)  20  101  147/89  --  Sitting  --  --  99  --  --  --  --     08/09/21 0100  --  --  --  --  --  --  --  --  --  --  0  --  --     08/09/21 0028  98 (36.7)  18  92  156/94  --  Sitting  --  --  --  --  2  --  --     08/09/21 0020  --  --  90  159/96  --  --  --  --  --  --  --  --  --     08/08/21 2300  --  --  --  --  --  --  --  --  --  --  0  --  --     08/08/21 2200  --  --  --  --  --  --  --  --  --  --  0  --  --     08/08/21 2100  --  --  --  --  --  --  --  --  --  --  0  --  --     08/08/21 2033  98 (36.7)  18  90  145/87  --  Sitting  --  --  --  --  --  --  --     08/08/21 2003  --  --  91  161/89  --  --  --  --  --  --  --  --  --     08/08/21 2000  --  --  --  --  --  --  --  --  --  --  0  --  --     08/08/21 1933  --  --  85  164/89  --  --  --  --  --  --  --  --  --     08/08/21 1903  --  --  83  148/82  --  --  --  --  --  --  --  --  --     08/08/21 1833  --  17  89  142/76  --  Semi fowlers  --  --  --  --  --  --  --     08/08/21 1803  --  15  85  142/86  -- Sitting  --  --  --  --  0  --  --     08/08/21 1732  --  16  93  149/78  --  Semi fowlers  --  --  --  --  0  --  --     08/08/21 1703  --  17  89  151/82  --  Semi fowlers  --  --  --  --  --  --  --     08/08/21 1633  --  16  93  142/79  --  Semi fowlers  --  --  --  --  --  --  --     08/08/21 1604  98.5 (36.9)  17  95  157/84  --  Semi fowlers  --  --  --  --  0  --  --     08/08/21 1551  --  16  89  165/89  --  Sitting  --  --  --  --  --  --  --     08/08/21 1513  --  17  90  155/82  --  Sitting  --  --  --  --  0  --  --     08/08/21 1319  98.6 (37)  16  97  144/78  --  Semi fowlers  --  --  --  --  0  --  --     08/08/21 1205  --  17  99  155/84  --  Sitting                     General appearance:  awake, alert, cooperative, no apparent distress, and appears stated age  Neurologic:  Awake, alert, oriented to name, place and time.     Lungs:  No increased work of breathing, good air exchange  Heart:  Regular rate and rhythm  Abdomen:  Soft, non tender, gravid, consistent with her gestational age  Fetal heart rate:   Category 1 x 2   Contraction frequency:  none   Extremities:  Edema bilateral 3+, 2+ DTRs, no clonus  Membranes:  Intact      Collected Updated Procedure    08/09/2021 0604 08/09/2021 0645 Magnesium [[id number]]   Blood    Component Value Units   Magnesium 1.9 mg/dl           08/09/2021 0604 08/09/2021 0645 Phosphorus [[id number]]   Blood    Component Value Units   Phosphorus 3.1 MG/DL           08/09/2021 0604 08/09/2021 0645 Comprehensive Metabolic Panel [[id number]]   (Abnormal)   Blood    Component Value Units   Sodium 139 MMOL/L   Potassium 3.2Low  MMOL/L   Chloride 104 mMol/L   CO2 26 MMOL/L   BUN 3Low  MG/DL   CREATININE 0.2Low  MG/DL   Glucose 78 MG/DL   Calcium 8.6 MG/DL   Albumin 3.4 GM/DL   Total Protein 5.9Low  GM/DL   Total Bilirubin 0.2 MG/DL   ALT 37 U/L   AST 66High  IU/L   Alkaline Phosphatase 119 IU/L   GFR Non-African American >60 mL/min/1.73m2   GFR African American >60 mL/min/1.73m2   Anion Gap 9            08/09/2021 0604 08/09/2021 0627 CBC Auto Differential [[id number]]   (Abnormal)   Blood    Component Value Units   WBC 14. 2High  K/CU MM   RBC 3.47Low  M/CU MM   Hemoglobin 10.1Low  GM/DL   Hematocrit 30.4Low  %   MCV 87.6 FL   MCH 29.1 PG   MCHC 33.2 %   RDW 15.5High  %   Platelets 052 K/CU MM   MPV 9.3 FL   Differential Type AUTOMATED DIFFERENTIAL    Segs Relative 74. 0High  %   Lymphocytes % 14. 9Low  %   Monocytes % 8.2High  %   Eosinophils % 1.2 %   Basophils % 0.4 %   Segs Absolute 10.5 K/CU MM   Lymphocytes Absolute 2.1 K/CU MM   Monocytes Absolute 1.2 K/CU MM   Eosinophils Absolute 0.2 K/CU MM   Basophils Absolute 0.1 K/CU MM   Nucleated RBC % 0.0 %   Total Nucleated RBC 0.0 K/CU MM   Total Immature Neutrophil 0.19 K/CU MM   Immature Neutrophil % 1.3High  %           08/09/2021 0604 08/09/2021 0604 Aldosterone, Serum [[id number]]   Blood    Component Value   No component results           08/08/2021 1525 08/08/2021 1606 Protein / creatinine ratio, urine [[id number]]   (Abnormal)   Urine, clean catch    Component Value Units   Urine Total Protein 7.8 MG/DL   Creatinine, Ur 23.2Low  MG/DL   Prot/Creat Ratio, Ur 0.3High                ECG: normal sinus rhythm, rate of 88      ASSESSMENT AND PLAN:  Dichorionic/Diamniotic twins at 29w1d  1. Preeclampsia (24 hour urine protein on 7/22/21 480mg) now with trending toward severe features (BPs as above despite starting procardia xl 30mg daily yesterday and elevated AST this AM)  -celestone received on 8/5 and 8/6  -begin magnesium sulfate for seizure prophylaxis and fetal neuroprotection  -transfer to [place] for level III NICU and MFM services. 2.  Hypokalemia and Hyomagnesemia  -post replacement are normal at this point, see nephrology notes    3. Heart flutters this morning, normal ECG, has seen cardiology in the past, does have a history of anxiety.     -will continue lexapro  -will stop wellbutrin (lowers seizure threshold and can make anxiety worse)  -continue to monitor anxiety and cardiac symptoms

## 2021-08-09 NOTE — FLOWSHEET NOTE
TR to Dr Danica Gonzales. Discussed PT's statement just now at bedside that she \"felt her heart flutter a lot last night\" and \" didn't want to say anything. \" Pt States feeling has resolved. Will wait for lab test results and ordered to inform oncoming regular physician.

## 2021-08-10 LAB
EKG ATRIAL RATE: 88 BPM
EKG DIAGNOSIS: NORMAL
EKG P AXIS: 71 DEGREES
EKG P-R INTERVAL: 136 MS
EKG Q-T INTERVAL: 378 MS
EKG QRS DURATION: 92 MS
EKG QTC CALCULATION (BAZETT): 457 MS
EKG R AXIS: 44 DEGREES
EKG T AXIS: 37 DEGREES
EKG VENTRICULAR RATE: 88 BPM

## 2021-08-10 PROCEDURE — 93010 ELECTROCARDIOGRAM REPORT: CPT | Performed by: INTERNAL MEDICINE

## 2021-08-11 LAB — ALDOSTERONE: <3 NG/DL

## 2021-08-17 LAB — RENIN PLASMA: 1.5 NG/ML/HR

## 2021-08-23 ENCOUNTER — TELEPHONE (OUTPATIENT)
Dept: OBGYN | Age: 26
End: 2021-08-23

## 2021-08-23 NOTE — TELEPHONE ENCOUNTER
Pt is 2 weeks post partum. Pt states she is on Lexapro and Wellbutrin. Pt would like to if her rx and be upped or if she can have [facility] help her with this. Pt states she is [facility] all the time and with her twins being in the NICU she doesn't have a lot of time to come in for an OV. What do you recommend?

## 2021-09-05 ENCOUNTER — HOSPITAL ENCOUNTER (EMERGENCY)
Age: 26
Discharge: PSYCHIATRIC OR SUBSTANCE USE FACILITY | End: 2021-09-06
Attending: EMERGENCY MEDICINE
Payer: COMMERCIAL

## 2021-09-05 DIAGNOSIS — R45.851 SUICIDAL IDEATION: Primary | ICD-10-CM

## 2021-09-05 LAB
ACETAMINOPHEN LEVEL: <5 UG/ML (ref 15–30)
ALBUMIN SERPL-MCNC: 4.4 GM/DL (ref 3.4–5)
ALCOHOL SCREEN SERUM: 0.08 %WT/VOL
ALP BLD-CCNC: 113 IU/L (ref 40–129)
ALT SERPL-CCNC: 11 U/L (ref 10–40)
AMPHETAMINES: NEGATIVE
ANION GAP SERPL CALCULATED.3IONS-SCNC: 10 MMOL/L (ref 4–16)
AST SERPL-CCNC: 14 IU/L (ref 15–37)
BACTERIA: NEGATIVE /HPF
BARBITURATE SCREEN URINE: NEGATIVE
BASOPHILS ABSOLUTE: 0.1 K/CU MM
BASOPHILS RELATIVE PERCENT: 0.6 % (ref 0–1)
BENZODIAZEPINE SCREEN, URINE: NEGATIVE
BILIRUB SERPL-MCNC: 0.2 MG/DL (ref 0–1)
BILIRUBIN URINE: NEGATIVE MG/DL
BLOOD, URINE: ABNORMAL
BUN BLDV-MCNC: 8 MG/DL (ref 6–23)
CALCIUM SERPL-MCNC: 9.5 MG/DL (ref 8.3–10.6)
CANNABINOID SCREEN URINE: NEGATIVE
CHLORIDE BLD-SCNC: 102 MMOL/L (ref 99–110)
CLARITY: CLEAR
CO2: 26 MMOL/L (ref 21–32)
COCAINE METABOLITE: NEGATIVE
COLOR: ABNORMAL
CREAT SERPL-MCNC: 0.7 MG/DL (ref 0.6–1.1)
DIFFERENTIAL TYPE: ABNORMAL
DOSE AMOUNT: ABNORMAL
DOSE AMOUNT: ABNORMAL
DOSE TIME: ABNORMAL
DOSE TIME: ABNORMAL
EOSINOPHILS ABSOLUTE: 0.1 K/CU MM
EOSINOPHILS RELATIVE PERCENT: 0.5 % (ref 0–3)
GFR AFRICAN AMERICAN: >60 ML/MIN/1.73M2
GFR NON-AFRICAN AMERICAN: >60 ML/MIN/1.73M2
GLUCOSE BLD-MCNC: 81 MG/DL (ref 70–99)
GLUCOSE, URINE: NEGATIVE MG/DL
HCT VFR BLD CALC: 42.2 % (ref 37–47)
HEMOGLOBIN: 13.5 GM/DL (ref 12.5–16)
IMMATURE NEUTROPHIL %: 0.3 % (ref 0–0.43)
INTERPRETATION: NORMAL
KETONES, URINE: NEGATIVE MG/DL
LEUKOCYTE ESTERASE, URINE: NEGATIVE
LYMPHOCYTES ABSOLUTE: 1.5 K/CU MM
LYMPHOCYTES RELATIVE PERCENT: 13.3 % (ref 24–44)
MCH RBC QN AUTO: 28.7 PG (ref 27–31)
MCHC RBC AUTO-ENTMCNC: 32 % (ref 32–36)
MCV RBC AUTO: 89.8 FL (ref 78–100)
MONOCYTES ABSOLUTE: 0.4 K/CU MM
MONOCYTES RELATIVE PERCENT: 3.1 % (ref 0–4)
NITRITE URINE, QUANTITATIVE: NEGATIVE
NUCLEATED RBC %: 0 %
OPIATES, URINE: NEGATIVE
OXYCODONE: NEGATIVE
PDW BLD-RTO: 14.7 % (ref 11.7–14.9)
PH, URINE: 6 (ref 5–8)
PHENCYCLIDINE, URINE: NEGATIVE
PLATELET # BLD: 428 K/CU MM (ref 140–440)
PMV BLD AUTO: 8.6 FL (ref 7.5–11.1)
POTASSIUM SERPL-SCNC: 3.7 MMOL/L (ref 3.5–5.1)
PREGNANCY, URINE: NEGATIVE
PROTEIN UA: NEGATIVE MG/DL
RBC # BLD: 4.7 M/CU MM (ref 4.2–5.4)
RBC URINE: 1 /HPF (ref 0–6)
SALICYLATE LEVEL: <0.3 MG/DL (ref 15–30)
SEGMENTED NEUTROPHILS ABSOLUTE COUNT: 9.3 K/CU MM
SEGMENTED NEUTROPHILS RELATIVE PERCENT: 82.2 % (ref 36–66)
SODIUM BLD-SCNC: 138 MMOL/L (ref 135–145)
SPECIFIC GRAVITY UA: 1 (ref 1–1.03)
SPECIFIC GRAVITY, URINE: 1 (ref 1–1.03)
SQUAMOUS EPITHELIAL: 2 /HPF
TOTAL IMMATURE NEUTOROPHIL: 0.03 K/CU MM
TOTAL NUCLEATED RBC: 0 K/CU MM
TOTAL PROTEIN: 8.1 GM/DL (ref 6.4–8.2)
TRICHOMONAS: ABNORMAL /HPF
UROBILINOGEN, URINE: NEGATIVE MG/DL (ref 0.2–1)
WBC # BLD: 11.3 K/CU MM (ref 4–10.5)
WBC UA: 1 /HPF (ref 0–5)

## 2021-09-05 PROCEDURE — 96374 THER/PROPH/DIAG INJ IV PUSH: CPT

## 2021-09-05 PROCEDURE — 81025 URINE PREGNANCY TEST: CPT

## 2021-09-05 PROCEDURE — 85025 COMPLETE CBC W/AUTO DIFF WBC: CPT

## 2021-09-05 PROCEDURE — 81001 URINALYSIS AUTO W/SCOPE: CPT

## 2021-09-05 PROCEDURE — G0480 DRUG TEST DEF 1-7 CLASSES: HCPCS

## 2021-09-05 PROCEDURE — 83721 ASSAY OF BLOOD LIPOPROTEIN: CPT

## 2021-09-05 PROCEDURE — 99285 EMERGENCY DEPT VISIT HI MDM: CPT

## 2021-09-05 PROCEDURE — 80307 DRUG TEST PRSMV CHEM ANLYZR: CPT

## 2021-09-05 PROCEDURE — 80061 LIPID PANEL: CPT

## 2021-09-05 PROCEDURE — 84443 ASSAY THYROID STIM HORMONE: CPT

## 2021-09-05 PROCEDURE — 80053 COMPREHEN METABOLIC PANEL: CPT

## 2021-09-05 NOTE — ED NOTES
Present to the ED with cuts on wrist, patient states that she has a lot going on at home and is depressed. Father and Mother at bedside stating that she had twins recently and has some post postpartum depression causing issues      Rebecca Nash  09/05/21 3886

## 2021-09-05 NOTE — ED PROVIDER NOTES
EMERGENCY DEPARTMENT ENCOUNTER    Patient: Tarsha Brink  MRN: [id number]  : 1995  Date of Evaluation: 2021  ED Provider:  Jonathan Rao MD    CHIEF COMPLAINT  Chief Complaint   Patient presents with    Suicidal     states that she doesn't care if she lives, has lacerations to arms       HPI  Tarsha Brink is a 31 y.o. female who is 3 weeks postpartum who presents with severe, constant depressed mood with suicidal thoughts as well as self-inflicted cutting to the left forearm which has been ongoing for the last several weeks after delivery of twins prematurely. Has supposedly seen primary care provider and has been prescribed Zoloft however has not taken it yet. Denies thoughts of harming anyone else. Denies auditory or visual hallucinations. Does admit to drinking alcohol today. Denies any other illicit drug use. Denies any other associated symptoms or complaints or concerns.       REVIEW OF SYSTEMS    Constitutional: negative for fever, chills  Neurological: negative for HA, focal weakness, loss of sensation  Ophthalmic: negative for vision change  ENT: negative for congestion, rhinorrhea  Cardiovascular: negative for chest pain  Respiratory: negative for SOB, cough  GI: negative for abdominal pain, nausea, vomiting, diarrhea, constipation  : negative for dysuria, hematuria  Musculoskeletal: negative for myalgias, decreased ROM, joint swelling  Dermatological: negative for rash  Heme: Negative for bleeding, bruising      PAST MEDICAL HISTORY  Past Medical History:   Diagnosis Date    Anxiety disorder     Asthma     Depression     Heart abnormality     heart murmur    Kidney stones        CURRENT MEDICATIONS  [unfilled]    ALLERGIES  Allergies   Allergen Reactions    Gold Rash     Gold jewelry    Silver Rash     Silver jewelry       SURGICAL HISTORY  Past Surgical History:   Procedure Laterality Date     SECTION       SECTION N/A 2020     SECTION performed by Zoya Kenney MD at [facility] L&D [place] Right 2021    CYSTOSCOPY, RIGHT URETEROSCOPY performed by Fabiola Moreno MD at [facility] OR       FAMILY HISTORY  Family History   Problem Relation Age of Onset    Cancer Other     Diabetes Other        SOCIAL HISTORY  Social History     Socioeconomic History    Marital status: Single     Spouse name: Not on file    Number of children: Not on file    Years of education: Not on file    Highest education level: Not on file   Occupational History    Not on file   Tobacco Use    Smoking status: Current Every Day Smoker     Packs/day: 1.00     Types: Cigarettes     Last attempt to quit: 3/3/2015     Years since quittin.5    Smokeless tobacco: Never Used   Vaping Use    Vaping Use: Never used   Substance and Sexual Activity    Alcohol use: Yes     Comment: reports heavy drinking the last 2 days (21)    Drug use: No    Sexual activity: Yes     Partners: Male   Other Topics Concern    Not on file   Social History Narrative    Not on file     Social Determinants of Health     Financial Resource Strain: High Risk    Difficulty of Paying Living Expenses: Hard   Food Insecurity: No Food Insecurity    Worried About Running Out of Food in the Last Year: Never true    [place] of Food in the Last Year: Never true   Transportation Needs:     Lack of Transportation (Medical):      Lack of Transportation (Non-Medical):    Physical Activity:     Days of Exercise per Week:     Minutes of Exercise per Session:    Stress:     Feeling of Stress :    Social Connections:     Frequency of Communication with Friends and Family:     Frequency of Social Gatherings with Friends and Family:     Attends Taoism Services:     Active Member of Clubs or Organizations:     Attends Club or Organization Meetings:     Marital Status:    Intimate Partner Violence:     Fear of Current or Ex-Partner:     Emotionally Abused:     Physically Abused:     Sexually Abused:          **Past medical, family and social histories, and nursing notes reviewed and verified by me**      PHYSICAL EXAM  VITAL SIGNS:   ED Triage Vitals [09/05/21 1806]   Enc Vitals Group      BP (!) 164/108      Pulse 107      Resp 19      Temp 98 °F (36.7 °C)      Temp Source Oral      SpO2 99 %      Weight 241 lb (109.3 kg)      Height 5' 7\" (1.702 m)      Head Circumference       Peak Flow       Pain Score       Pain Loc       Pain Edu? Excl. in [address]? Vitals during ED course were reviewed and are as charted. Constitutional: Minimal distress, Non-toxic appearance  Eyes: Conjunctiva normal, No discharge, PERRL HENT: Normocephalic, Atraumatic, oropharynx moist  Neck: Supple, no stridor, no grossly visible or palpable masses  Cardiovascular: Regular rate and rhythm, No murmurs, No rubs, No gallops, 2+ radial pulse  Pulmonary/Chest: Normal breath sounds, No respiratory distress or accessory muscle use, No wheezing, crackles or rhonchi. Abdomen: Soft, nondistended and nonrigid, No tenderness or peritoneal signs, No masses, normal bowel sounds  Back: No midline point tenderness, No paraspinous muscle tenderness. No CVA tenderness  Extremities: No gross deformities, no edema, no tenderness  Neurologic: Normal motor function, Normal sensory function, No focal deficits  Skin: Multiple very superficial linear abrasions on the distal volar left forearm with no active bleeding and no gaping wounds.   Otherwise skin elsewhere is warm, Dry, No erythema, No rash, No cyanosis, No mottling  Lymphatic: No lymphadenopathy in the following location(s): cervical  Psychiatric: Alert and oriented x3, Affect restricted            RADIOLOGY/PROCEDURES/LABS/MEDICATIONS ADMINISTERED:    I have reviewed and interpreted all of the currently available lab results from this visit (if applicable):  Results for orders placed or performed during the hospital encounter of 09/05/21   CBC Auto Differential Result Value Ref Range    WBC 11.3 (H) 4.0 - 10.5 K/CU MM    RBC 4.70 4.2 - 5.4 M/CU MM    Hemoglobin 13.5 12.5 - 16.0 GM/DL    Hematocrit 42.2 37 - 47 %    MCV 89.8 78 - 100 FL    MCH 28.7 27 - 31 PG    MCHC 32.0 32.0 - 36.0 %    RDW 14.7 11.7 - 14.9 %    Platelets 859 159 - 818 K/CU MM    MPV 8.6 7.5 - 11.1 FL    Differential Type AUTOMATED DIFFERENTIAL     Segs Relative 82.2 (H) 36 - 66 %    Lymphocytes % 13.3 (L) 24 - 44 %    Monocytes % 3.1 0 - 4 %    Eosinophils % 0.5 0 - 3 %    Basophils % 0.6 0 - 1 %    Segs Absolute 9.3 K/CU MM    Lymphocytes Absolute 1.5 K/CU MM    Monocytes Absolute 0.4 K/CU MM    Eosinophils Absolute 0.1 K/CU MM    Basophils Absolute 0.1 K/CU MM    Nucleated RBC % 0.0 %    Total Nucleated RBC 0.0 K/CU MM    Total Immature Neutrophil 0.03 K/CU MM    Immature Neutrophil % 0.3 0 - 0.43 %   Comprehensive Metabolic Panel w/ Reflex to MG   Result Value Ref Range    Sodium 138 135 - 145 MMOL/L    Potassium 3.7 3.5 - 5.1 MMOL/L    Chloride 102 99 - 110 mMol/L    CO2 26 21 - 32 MMOL/L    BUN 8 6 - 23 MG/DL    CREATININE 0.7 0.6 - 1.1 MG/DL    Glucose 81 70 - 99 MG/DL    Calcium 9.5 8.3 - 10.6 MG/DL    Albumin 4.4 3.4 - 5.0 GM/DL    Total Protein 8.1 6.4 - 8.2 GM/DL    Total Bilirubin 0.2 0.0 - 1.0 MG/DL    ALT 11 10 - 40 U/L    AST 14 (L) 15 - 37 IU/L    Alkaline Phosphatase 113 40 - 129 IU/L    GFR Non-African American >60 >60 mL/min/1.73m2    GFR African American >60 >60 mL/min/1.73m2    Anion Gap 10 4 - 16   Urinalysis, reflex to microscopic   Result Value Ref Range    Color, UA STRAW (A) YELLOW    Clarity, UA CLEAR CLEAR    Glucose, Urine NEGATIVE NEGATIVE MG/DL    Bilirubin Urine NEGATIVE NEGATIVE MG/DL    Ketones, Urine NEGATIVE NEGATIVE MG/DL    Specific Gravity, UA 1.003 1.001 - 1.035    Blood, Urine LARGE (A) NEGATIVE    pH, Urine 6.0 5.0 - 8.0    Protein, UA NEGATIVE NEGATIVE MG/DL    Urobilinogen, Urine NEGATIVE 0.2 - 1.0 MG/DL    Nitrite Urine, Quantitative NEGATIVE NEGATIVE Leukocyte Esterase, Urine NEGATIVE NEGATIVE    RBC, UA 1 0 - 6 /HPF    WBC, UA 1 0 - 5 /HPF    Bacteria, UA NEGATIVE NEGATIVE /HPF    Squam Epithel, UA 2 /HPF    Trichomonas, UA NONE SEEN NONE SEEN /HPF   Pregnancy, Urine   Result Value Ref Range    Pregnancy, Urine NEGATIVE NEGATIVE    Specific Gravity, Urine 1.003 1.001 - 1.035    Interpretation HCG METHOD LIMITATIONS:    Ethanol   Result Value Ref Range    Alcohol Scrn 0.08 (H) <0.01 %WT/VOL   Drug screen multi urine   Result Value Ref Range    Cannabinoid Scrn, Ur NEGATIVE NEGATIVE    Amphetamines NEGATIVE NEGATIVE    Cocaine Metabolite NEGATIVE NEGATIVE    Benzodiazepine Screen, Urine NEGATIVE NEGATIVE    Barbiturate Screen, Ur NEGATIVE NEGATIVE    Opiates, Urine NEGATIVE NEGATIVE    Phencyclidine, Urine NEGATIVE NEGATIVE    Oxycodone NEGATIVE NEGATIVE   Acetaminophen level   Result Value Ref Range    Acetaminophen Level <5.0 (L) 15 - 30 ug/ml    DOSE AMOUNT DOSE AMT. GIVEN - UNKNOWN     DOSE TIME DOSE TIME GIVEN - UNKNOWN    Salicylate   Result Value Ref Range    Salicylate Lvl <3.4 (L) 15 - 30 MG/DL    DOSE AMOUNT DOSE AMT.  GIVEN - UNKNOWN     DOSE TIME DOSE TIME GIVEN - UNKNOWN           ABNORMAL LABS:  Labs Reviewed   CBC WITH AUTO DIFFERENTIAL - Abnormal; Notable for the following components:       Result Value    WBC 11.3 (*)     Segs Relative 82.2 (*)     Lymphocytes % 13.3 (*)     All other components within normal limits   COMPREHENSIVE METABOLIC PANEL W/ REFLEX TO MG FOR LOW K - Abnormal; Notable for the following components:    AST 14 (*)     All other components within normal limits   URINALYSIS - Abnormal; Notable for the following components:    Color, UA STRAW (*)     Blood, Urine LARGE (*)     All other components within normal limits   ETHANOL - Abnormal; Notable for the following components:    Alcohol Scrn 0.08 (*)     All other components within normal limits   ACETAMINOPHEN LEVEL - Abnormal; Notable for the following components: Acetaminophen Level <5.0 (*)     All other components within normal limits   SALICYLATE LEVEL - Abnormal; Notable for the following components:    Salicylate Lvl <8.1 (*)     All other components within normal limits   COVID-19, RAPID   PREGNANCY, URINE   URINE DRUG SCREEN         IMAGING STUDIES ORDERED:  SUICIDE PRECAUTIONS  IP CONSULT TO PSYCHOLOGY      No orders to display         MEDICATIONS ADMINISTERED:  Medications   LORazepam (ATIVAN) tablet 2 mg (has no administration in time range)   nicotine (NICODERM CQ) 14 MG/24HR 1 patch (has no administration in time range)         COURSE & MEDICAL DECISION MAKING  Last vitals: BP (!) 164/108   Pulse 107   Temp 98 °F (36.7 °C) (Oral)   Resp 19   Ht 5' 7\" (1.702 m)   Wt 241 lb (109.3 kg)   LMP 01/17/2021   SpO2 99%   Breastfeeding Unknown   BMI 37.75 kg/m²     24-year-old female with depressed mood and suicidal ideations. No clinical evidence to suggest medical instability and has been medically cleared. Has been pink slipped and placed under involuntary 72-hour hold and has been evaluated by mental health. She did request to be given some to help her calm down and was given Ativan. Patient will be admitted for further inpatient psychiatric evaluation and treatment. Clinical Impression:  1. Suicidal ideation        Disposition referral (if applicable):  No follow-up provider specified. Disposition medications (if applicable):  New Prescriptions    No medications on file       ED Provider Disposition Time  DISPOSITION            Electronically signed by: Giorgi Hughes M.D., 9/6/2021 12:42 AM      This dictation was created with voice recognition software. While attempts have been made to review the dictation as it is transcribed, on occasion the spoken word can be misinterpreted by the technology leading to omissions or inappropriate words, phrases or sentences.        Carmelita Vergara MD  09/06/21 9827       Carmelita Vergara MD  09/06/21 9064

## 2021-09-05 NOTE — ED NOTES
Pt denies SI to this nurse, states \"I don't want to kill myself\", pt has a lot of stressers at home, twins are 4 weeks old in NICU at [facility], pt very agitated with triage questions, snapping at this nurse, reports heavy drinking the last 2 days. Mother bedside states pt had post-partum depression with previous 2 pregnancies but this is\" way worse\".       Armond Castillo, NOE  09/05/21 2264

## 2021-09-06 VITALS
TEMPERATURE: 98.1 F | DIASTOLIC BLOOD PRESSURE: 90 MMHG | OXYGEN SATURATION: 97 % | BODY MASS INDEX: 37.83 KG/M2 | WEIGHT: 241 LBS | RESPIRATION RATE: 16 BRPM | HEART RATE: 121 BPM | SYSTOLIC BLOOD PRESSURE: 142 MMHG | HEIGHT: 67 IN

## 2021-09-06 LAB
SARS-COV-2, NAAT: NOT DETECTED
SOURCE: NORMAL

## 2021-09-06 PROCEDURE — 6370000000 HC RX 637 (ALT 250 FOR IP): Performed by: EMERGENCY MEDICINE

## 2021-09-06 PROCEDURE — 6360000002 HC RX W HCPCS: Performed by: EMERGENCY MEDICINE

## 2021-09-06 PROCEDURE — 87635 SARS-COV-2 COVID-19 AMP PRB: CPT

## 2021-09-06 PROCEDURE — 6370000000 HC RX 637 (ALT 250 FOR IP)

## 2021-09-06 RX ORDER — NIFEDIPINE 30 MG/1
30 TABLET, EXTENDED RELEASE ORAL DAILY
COMMUNITY

## 2021-09-06 RX ORDER — LORAZEPAM 2 MG/ML
2 INJECTION INTRAMUSCULAR ONCE
Status: COMPLETED | OUTPATIENT
Start: 2021-09-06 | End: 2021-09-06

## 2021-09-06 RX ORDER — NIFEDIPINE 10 MG/1
CAPSULE ORAL
Status: COMPLETED
Start: 2021-09-06 | End: 2021-09-06

## 2021-09-06 RX ORDER — NICOTINE 21 MG/24HR
1 PATCH, TRANSDERMAL 24 HOURS TRANSDERMAL DAILY
Status: DISCONTINUED | OUTPATIENT
Start: 2021-09-06 | End: 2021-09-06 | Stop reason: HOSPADM

## 2021-09-06 RX ORDER — NIFEDIPINE 30 MG/1
60 TABLET, EXTENDED RELEASE ORAL ONCE
Status: COMPLETED | OUTPATIENT
Start: 2021-09-06 | End: 2021-09-06

## 2021-09-06 RX ORDER — NIFEDIPINE 10 MG/1
CAPSULE ORAL
Status: DISCONTINUED
Start: 2021-09-06 | End: 2021-09-06 | Stop reason: HOSPADM

## 2021-09-06 RX ORDER — LORAZEPAM 1 MG/1
2 TABLET ORAL ONCE
Status: DISCONTINUED | OUTPATIENT
Start: 2021-09-06 | End: 2021-09-06

## 2021-09-06 RX ADMIN — NIFEDIPINE 60 MG: 30 TABLET, FILM COATED, EXTENDED RELEASE ORAL at 09:25

## 2021-09-06 RX ADMIN — LORAZEPAM 2 MG: 2 INJECTION INTRAMUSCULAR; INTRAVENOUS at 01:55

## 2021-09-06 NOTE — ED PROVIDER NOTES
6:38 AM EDT  I received patient in sign out from Dr. Lori Lew. Patient was evaluated by previous provider, medical clearance labs were performed. Medically cleared. Patient is currently awaiting placement in inpatient psychiatric care facility. Jarocho Fabian MD  09/06/21 6714      Placement found at [facility]. Accepted by Dr. Arcenio Mederos. Transport arranged.       Jarocho Fabian MD  09/06/21 0531

## 2021-09-06 NOTE — ED NOTES
Report called to [facility] [place].       Deepak Gaytan RN  09/06/21 0947

## 2021-09-06 NOTE — ED NOTES
[facility] is here to take patient to [facility]. Patient changed into a blue gown prior to transport. Patient states her mother took all of her belongings/phone/etc home with them yesterday.      Gunjan Lomas RN  09/06/21 9619

## 2021-09-06 NOTE — ED NOTES
Provisional Diagnosis:  Suicide Attempt  Self Cutting  History of Postpartum Depression  ETOH Use Per Lab Findings  Multiple Life Stressors    Psychosocial and Contextual Factors:   Per Dr. Raulito Cortez ED Physician:  Raakn Martinez is a 31 y.o. female who is 3 weeks postpartum who presents with severe, constant depressed mood with suicidal thoughts as well as self-inflicted cutting to the left forearm which has been ongoing for the last several weeks after delivery of twins prematurely. Has supposedly seen primary care provider and has been prescribed Zoloft however has not taken it yet. Denies thoughts of harming anyone else. Denies auditory or visual hallucinations. Does admit to drinking alcohol today. Denies any other illicit drug use. \"      Per Mirella Copper ED RN:  \"Pt denies SI to this nurse, states \"I don't want to kill myself\", pt has a lot of stressers at home, twins are 4 weeks old in NICU at [facility], pt very agitated with triage questions, snapping at this nurse, reports heavy drinking the last 2 days. Mother bedside states pt had post-partum depression with previous 2 pregnancies but this is\" way worse\". [facility]  \"Present to the ED with cuts on wrist, patient states that she has a lot going on at home and is depressed. Father and Mother at bedside stating that she had twins recently and has some post postpartum depression causing issues \"    Spoke with patient alone. Shares she has a history of postpartum depression, admits having cut her wrist earlier, \"with a blade\" due to feeling suicidal, very overwhelmed and extremely depressed. Notes experiencing multiple life stressors with  twins premature at 35 weeks and are in the NICU @ [facility]. Crying off and on during our conversation together. Irritable, provides very brief answers to the questions I ask of her. Appears a bit disheveled, says she is living alone and seems to have a limited support sytem.     Notes feeling hopeless, alone and very, very sad. Not sleeping with very poor appetite. Unable to assure her own safety if discharged. Very irritable, secretive and makes very minimal eye contact with me. Brought \"step-mother\" into the room per patient request.    Vibha Rider also provided verbal permission to speak together with herself, stepmother and me. Step-mother provides no history, says she was called by patient's \"mom and dad\" and would sit with Vibha Rider as long as needed tonight. Notes having no collateral information to share. Denies homicidal ideation and plan. Denies auditory and visual hallucinations. Is unable to assure her own safety if discharged. Unable to assist in the development of a plan for her safety. Vibha Rider says she has been drinking over the past couple of days but will share nothing further in this regard. Is at a very high threat for continued self harm. Would benefit from psychiatric care for observation, evaluation and safety. Discussed above with [facility] ED Physician, he supports Involuntary Psychiatric Admission. Will seek Placement. Cierra Colvin Summary:     Patient: As above  Family: No other history is shared  Agency: No Current Tx or Rx Meds      Present Suicidal Behavior:     Verbal: As noted    Attempt: Self cutting as per above    Past Suicidal Behavior:     Verbal: None shared    Attempt: None shared      Self-Injurious/Self-Mutilation: Self cutting    Trauma Identified: Recent birth of  twins 33 weeks in the NICU @  [facility]. Protective Factors:    None are identified at this time    Risk Factors:    Suicide Attempt  Self Cutting  History of Postpartum Depression  ETOH Use Per Lab Findings  Multiple Life Stressors      Clinical Summary:    As above.   Seeking placement    Electronically signed by Luana Patten RN on 8801 at 95:79 AM     Luana Patten RN   [id number]

## 2021-09-06 NOTE — ED NOTES
Had spoken earlier with Bozena Rosenthal RN @ [facility], there is an open female bed.  (Brief chart review completed)     Grace Ayala RN  91/69/80 [id number]

## 2021-09-06 NOTE — ED NOTES
Security called back to room because too many visitors again      [place]  09/05/21 2034

## 2021-09-06 NOTE — ED NOTES
Patient updated on plan for transfer to [facility] at 1000. Patient provided phone to update family. Patient denies other needs. Sitter at bedside.       [employer], [address] [facility]  09/06/21 0612

## 2021-09-06 NOTE — ED NOTES
This RN takes assumption of care. Patient resting in bed, eyes closed. No distress noted. Sitter at bedside per 1:1 suicide protocols. Will continue to monitor patient.       [employer], [address] [facility]  09/06/21 6825

## 2021-09-06 NOTE — ED NOTES
Called Mini LIU @ [facility] to assist with placement @ [facility].      Bam Kaufman RN  25/49/87 [phone]

## 2021-09-07 LAB
CHOLESTEROL: 199 MG/DL
HDLC SERPL-MCNC: 43 MG/DL
LDL CHOLESTEROL DIRECT: 118 MG/DL
TRIGL SERPL-MCNC: 156 MG/DL
TSH HIGH SENSITIVITY: 0.43 UIU/ML (ref 0.27–4.2)

## 2021-09-20 ENCOUNTER — TELEPHONE (OUTPATIENT)
Dept: OBGYN | Age: 26
End: 2021-09-20
Payer: COMMERCIAL

## 2021-09-20 DIAGNOSIS — N92.0 MENORRHAGIA WITH REGULAR CYCLE: Primary | ICD-10-CM

## 2021-09-20 RX ORDER — TRANEXAMIC ACID 650 1/1
1300 TABLET ORAL 3 TIMES DAILY
Qty: 30 TABLET | Refills: 0 | Status: SHIPPED | OUTPATIENT
Start: 2021-09-20 | End: 2021-09-25

## 2021-09-20 NOTE — TELEPHONE ENCOUNTER
Pt delivered 8/9/21-csection with bps. Pt has been bleeding since delivery, getting heavier. Pt is bleeding through her tampon in 15 minutes-getting on her clothes. Also c/o cramping. PP scheduled 10/5   Bottlefeeding.

## 2021-09-22 ENCOUNTER — NURSE ONLY (OUTPATIENT)
Dept: OBGYN | Age: 26
End: 2021-09-22

## 2021-09-22 DIAGNOSIS — N83.202 LEFT OVARIAN CYST: ICD-10-CM

## 2021-09-22 DIAGNOSIS — N92.1 MENOMETRORRHAGIA: Primary | ICD-10-CM

## 2021-09-22 LAB
BASOPHILS ABSOLUTE: 0.1 K/UL (ref 0–0.2)
BASOPHILS RELATIVE PERCENT: 1.3 %
EOSINOPHILS ABSOLUTE: 0.2 K/UL (ref 0–0.6)
EOSINOPHILS RELATIVE PERCENT: 2.7 %
HCG QUALITATIVE: NEGATIVE
HCT VFR BLD CALC: 37.2 % (ref 36–48)
HEMOGLOBIN: 12.5 G/DL (ref 12–16)
LYMPHOCYTES ABSOLUTE: 1.6 K/UL (ref 1–5.1)
LYMPHOCYTES RELATIVE PERCENT: 20.1 %
MCH RBC QN AUTO: 28.8 PG (ref 26–34)
MCHC RBC AUTO-ENTMCNC: 33.7 G/DL (ref 31–36)
MCV RBC AUTO: 85.5 FL (ref 80–100)
MONOCYTES ABSOLUTE: 0.4 K/UL (ref 0–1.3)
MONOCYTES RELATIVE PERCENT: 5.4 %
NEUTROPHILS ABSOLUTE: 5.6 K/UL (ref 1.7–7.7)
NEUTROPHILS RELATIVE PERCENT: 70.5 %
PDW BLD-RTO: 15.4 % (ref 12.4–15.4)
PLATELET # BLD: 445 K/UL (ref 135–450)
PMV BLD AUTO: 7.5 FL (ref 5–10.5)
RBC # BLD: 4.35 M/UL (ref 4–5.2)
TSH SERPL DL<=0.05 MIU/L-ACNC: 1.79 UIU/ML (ref 0.27–4.2)
WBC # BLD: 8 K/UL (ref 4–11)

## 2021-09-22 PROCEDURE — 36415 COLL VENOUS BLD VENIPUNCTURE: CPT | Performed by: OBSTETRICS & GYNECOLOGY

## 2021-09-22 PROCEDURE — 76830 TRANSVAGINAL US NON-OB: CPT | Performed by: OBSTETRICS & GYNECOLOGY

## 2021-09-22 NOTE — PROGRESS NOTES
See ultrasound report. Images and report reviewed and I agree with comments and findings. No changes or additions.

## 2021-11-18 ENCOUNTER — TELEPHONE (OUTPATIENT)
Dept: OBGYN | Age: 26
End: 2021-11-18

## 2021-11-18 NOTE — TELEPHONE ENCOUNTER
Pt was prescribed Amoxicillin and Prednisone from the [facility] teledoc 11/15/2021. Now c/o urinary burning and frequency - wants rx. Pt states this happens every time she takes amox. Delivered: 8/9/21-bottlefeeding, just scheduled PP appt.

## 2021-11-18 NOTE — TELEPHONE ENCOUNTER
Recommend an office visit with Millie Ferreira or myself. She is already had one telemedicine visit and she is still having problems.

## 2021-12-07 ENCOUNTER — POSTPARTUM VISIT (OUTPATIENT)
Dept: OBGYN | Age: 26
End: 2021-12-07
Payer: COMMERCIAL

## 2021-12-07 VITALS
HEIGHT: 67 IN | WEIGHT: 219 LBS | BODY MASS INDEX: 34.37 KG/M2 | SYSTOLIC BLOOD PRESSURE: 135 MMHG | DIASTOLIC BLOOD PRESSURE: 86 MMHG

## 2021-12-07 DIAGNOSIS — N89.8 VAGINAL DISCHARGE: ICD-10-CM

## 2021-12-07 DIAGNOSIS — E66.01 MORBID OBESITY (HCC): ICD-10-CM

## 2021-12-07 PROCEDURE — 99213 OFFICE O/P EST LOW 20 MIN: CPT

## 2021-12-07 RX ORDER — LANOLIN ALCOHOL/MO/W.PET/CERES
400 CREAM (GRAM) TOPICAL DAILY
COMMUNITY

## 2021-12-07 RX ORDER — AMLODIPINE BESYLATE 10 MG/1
10 TABLET ORAL DAILY
COMMUNITY

## 2021-12-07 ASSESSMENT — ENCOUNTER SYMPTOMS
RESPIRATORY NEGATIVE: 1
ABDOMINAL PAIN: 0
GASTROINTESTINAL NEGATIVE: 1

## 2021-12-07 NOTE — PROGRESS NOTES
Post Partum Progress Note    Subjective:   Patient is a 31 y.o.  [id number]  female S/P uncomplicated . The pregnancy was complicated by preeclampsia. No current complaints are noted including headache, change in vision, fever, chills, chest pain, shortness of breath, nausea, vomiting, diarrhea or constipation. The patient denies any urinary complaints or calf tenderness. Lochia is described as minimal. The patient plans to bottle feed. Complaints of post partum depression: Yes. Pt is currently taking Zoloft, seeing psychiatrist, reports next appt scheduled for next month. States pp depression is much better, still has some depressive episodes but much less common. Denies suicidal/homicidal ideation. Last Pap smear: . Review of Systems   Constitutional: Negative. Negative for fatigue and fever. Respiratory: Negative. Gastrointestinal: Negative. Negative for abdominal pain. Endocrine: Negative. Genitourinary: Positive for vaginal discharge. Negative for menstrual problem and vaginal pain. Musculoskeletal: Negative. Skin: Negative. Neurological: Negative. Negative for dizziness and headaches. Psychiatric/Behavioral: Negative. Objective:   Physical Exam  Vitals and nursing note reviewed. Constitutional:       General: She is not in acute distress. Appearance: Normal appearance. She is obese. HENT:      Head: Normocephalic and atraumatic. Pulmonary:      Effort: Pulmonary effort is normal. No respiratory distress. Abdominal:      General: A surgical scar is present. Palpations: Abdomen is soft. Tenderness: There is no abdominal tenderness. Comments: No erythema, warmth, drainage from surgical incision   Genitourinary:     General: Normal vulva. Exam position: Lithotomy position. Vagina: Vaginal discharge present.       Cervix: Normal.      Uterus: Normal.       Adnexa: Right adnexa normal and left adnexa normal.   Musculoskeletal: General: Normal range of motion. Cervical back: Normal range of motion. Skin:     General: Skin is warm and dry. Neurological:      General: No focal deficit present. Mental Status: She is alert and oriented to person, place, and time. Psychiatric:         Mood and Affect: Mood normal.         Speech: Speech normal.         Behavior: Behavior normal.         Thought Content: Thought content normal.        Impression:  S/P     Assessment / Plan:   Diagnosis Orders   1. Postpartum care and examination  Vaginal Pathogens Probes *A    C.trachomatis N.gonorrhoeae DNA   2. Vaginal discharge  Vaginal Pathogens Probes *A    C.trachomatis N.gonorrhoeae DNA   3. Postpartum depression     4. Morbid obesity ([facility] [address]        Swabs today. Pt encouraged to continue monitoring pp depression and call/return if it begins to worsen. Return if symptoms worsen or fail to improve.       Enrike Cristobal PA-C

## 2021-12-08 LAB
C TRACH DNA GENITAL QL NAA+PROBE: NEGATIVE
CANDIDA SPECIES, DNA PROBE: ABNORMAL
GARDNERELLA VAGINALIS, DNA PROBE: ABNORMAL
N. GONORRHOEAE DNA: NEGATIVE
TRICHOMONAS VAGINALIS DNA: ABNORMAL

## 2021-12-08 RX ORDER — METRONIDAZOLE 500 MG/1
500 TABLET ORAL 2 TIMES DAILY
Qty: 14 TABLET | Refills: 0 | Status: SHIPPED | OUTPATIENT
Start: 2021-12-08 | End: 2021-12-15

## 2021-12-08 RX ORDER — FLUCONAZOLE 150 MG/1
150 TABLET ORAL ONCE
Qty: 1 TABLET | Refills: 0 | Status: SHIPPED | OUTPATIENT
Start: 2021-12-08 | End: 2021-12-08

## 2022-04-14 ENCOUNTER — OFFICE VISIT (OUTPATIENT)
Dept: OBGYN | Age: 27
End: 2022-04-14
Payer: COMMERCIAL

## 2022-04-14 VITALS
BODY MASS INDEX: 38.8 KG/M2 | DIASTOLIC BLOOD PRESSURE: 95 MMHG | WEIGHT: 219 LBS | HEIGHT: 63 IN | SYSTOLIC BLOOD PRESSURE: 141 MMHG

## 2022-04-14 DIAGNOSIS — Z11.3 SCREENING EXAMINATION FOR STD (SEXUALLY TRANSMITTED DISEASE): ICD-10-CM

## 2022-04-14 DIAGNOSIS — E66.9 OBESITY (BMI 30-39.9): ICD-10-CM

## 2022-04-14 DIAGNOSIS — Z01.419 WOMEN'S ANNUAL ROUTINE GYNECOLOGICAL EXAMINATION: Primary | ICD-10-CM

## 2022-04-14 DIAGNOSIS — R30.0 DYSURIA: ICD-10-CM

## 2022-04-14 DIAGNOSIS — R35.0 FREQUENT URINATION: ICD-10-CM

## 2022-04-14 DIAGNOSIS — N89.8 VAGINAL DISCHARGE: ICD-10-CM

## 2022-04-14 LAB
BILIRUBIN, POC: NORMAL
BLOOD URINE, POC: NORMAL
CLARITY, POC: NORMAL
COLOR, POC: NORMAL
GLUCOSE URINE, POC: NORMAL
KETONES, POC: NORMAL
LEUKOCYTE EST, POC: NORMAL
NITRITE, POC: NORMAL
PH, POC: 7.5
PROTEIN, POC: NORMAL
SPECIFIC GRAVITY, POC: NORMAL
UROBILINOGEN, POC: NORMAL

## 2022-04-14 PROCEDURE — 81002 URINALYSIS NONAUTO W/O SCOPE: CPT

## 2022-04-14 PROCEDURE — 99395 PREV VISIT EST AGE 18-39: CPT

## 2022-04-14 RX ORDER — FERROUS SULFATE 325(65) MG
325 TABLET ORAL
COMMUNITY

## 2022-04-14 RX ORDER — NITROFURANTOIN 25; 75 MG/1; MG/1
100 CAPSULE ORAL 2 TIMES DAILY
Qty: 20 CAPSULE | Refills: 0 | Status: SHIPPED | OUTPATIENT
Start: 2022-04-14 | End: 2022-04-24

## 2022-04-14 ASSESSMENT — ENCOUNTER SYMPTOMS
RESPIRATORY NEGATIVE: 1
GASTROINTESTINAL NEGATIVE: 1
ABDOMINAL PAIN: 0

## 2022-04-14 NOTE — PROGRESS NOTES
22    Savannah MELVIN Harrison  1995    Chief Complaint   Patient presents with    Gynecologic Exam     pt here for annual, is sexually active, b.c-tubal ligation, LMP-3/17/22, last pap-4/15/21    Other     pt c/o dysuria and frequent urination x 2 day. The patient is a 28 y.o. female, [id number] who presents for her annual exam.  She is menstruating normally. She is  sexually active. She is currently taking birth control. Birth control method is sterilization    She reports additional symptoms of dysuria, vaginal discharge/irritation and urinary frequency. Pt reports history of frequent BV infection, states she gets them monthly. Also reports urinary frequency and pain that occurs immediately after urinating. Also requesting STD screen    Pap smear history: Her last PAP smear was in .   Her results were normal.    Past Medical History:   Diagnosis Date    ADHD     Anxiety     Anxiety disorder     Asthma     Depression     Dyspareunia, female     Dysuria     Endometriosis     Frequent urination     Heart abnormality     heart murmur    Herpes simplex virus (HSV) infection     History of irregular menstrual bleeding     Hx of chlamydia infection     IBS (irritable bowel syndrome)     Kidney stones     Obesity     Preeclampsia        Past Surgical History:   Procedure Laterality Date     SECTION       SECTION N/A 2020     SECTION performed by Kristen Beebe MD at [facility]    CYSTOSCOPY Right 2021    CYSTOSCOPY, RIGHT URETEROSCOPY performed by Kai Castillo MD at [address]         Family History   Problem Relation Age of Onset    Hypertension Mother     Diabetes Father     Hypertension Father     Cancer Other        Social History     Tobacco Use    Smoking status: Current Every Day Smoker     Packs/day: 1.00     Types: Cigarettes     Start date:     Smokeless tobacco: Never Used   Vaping Use    Vaping Use: Never used   Substance Use Topics    Alcohol use: Yes     Comment: occ    Drug use: No       Current Outpatient Medications   Medication Sig Dispense Refill    ferrous sulfate (IRON 325) 325 (65 Fe) MG tablet Take 325 mg by mouth daily (with breakfast)      nitrofurantoin, macrocrystal-monohydrate, (MACROBID) 100 MG capsule Take 1 capsule by mouth 2 times daily for 10 days 20 capsule 0    Boric Acid Vaginal 600 MG SUPP Place 1 suppository vaginally at bedtime 90 suppository 2    sertraline (ZOLOFT) 50 MG tablet Take 50 mg by mouth daily       amLODIPine (NORVASC) 10 MG tablet Take 10 mg by mouth daily      folic acid (FOLVITE) 142 MCG tablet Take 400 mcg by mouth daily      hydrOXYzine (VISTARIL) 25 MG capsule Take 1 capsule by mouth 4 times daily as needed for Anxiety 20 capsule 0    albuterol sulfate  (90 Base) MCG/ACT inhaler INHALE 2 PUFFS PO EVERY 4-6 HOURS PRN      tranexamic acid (LYSTEDA) 650 MG TABS tablet Take 2 tablets by mouth 3 times daily for 5 days 30 tablet 0    NIFEdipine (PROCARDIA XL) 30 MG extended release tablet Take 30 mg by mouth daily (Patient not taking: Reported on 4/14/2022)      escitalopram (LEXAPRO) 20 MG tablet Take 0.5 tablets by mouth daily (Patient not taking: Reported on 12/7/2021) 30 tablet 0    buPROPion (WELLBUTRIN XL) 150 MG extended release tablet Take 150 mg by mouth every morning (Patient not taking: Reported on 4/14/2022)      aspirin 81 MG chewable tablet Take 81 mg by mouth daily (Patient not taking: Reported on 4/14/2022)      albuterol sulfate HFA (PROVENTIL HFA) 108 (90 Base) MCG/ACT inhaler Inhale 2 puffs into the lungs every 4 hours as needed for Wheezing or Shortness of Breath With spacer (and mask if indicated). Thanks.  1 Inhaler 1    Prenatal MV-Min-Fe Fum-FA-DHA (PRENATAL 1) 30-0.975-200 MG CAPS Take 1 tablet by mouth daily (Patient not taking: Reported on 12/7/2021) 30 capsule 3     No current facility-administered medications for this visit. Allergies   Allergen Reactions    Latex Itching    Gold Rash     Gold jewelry    Silver Rash     Silver jewelry       [id number]    Immunization History   Administered Date(s) Administered    COVID-19, Pfizer Purple top, DILUTE for use, 12+ yrs, 30mcg/0.3mL dose 09/10/2021, 10/01/2021       Review of Systems   Constitutional: Negative. Negative for fatigue and fever. Respiratory: Negative. Gastrointestinal: Negative. Negative for abdominal pain. Endocrine: Negative. Genitourinary: Positive for dysuria, frequency and vaginal discharge. Negative for menstrual problem, pelvic pain, vaginal bleeding and vaginal pain. Musculoskeletal: Negative. Skin: Negative. Neurological: Negative. Negative for dizziness and headaches. Psychiatric/Behavioral: Negative. BP (!) 141/95   Ht 5' 3\" (1.6 m)   Wt 219 lb (99.3 kg)   LMP 03/17/2022   BMI 38.79 kg/m²     Physical Exam  Vitals and nursing note reviewed. Constitutional:       General: She is not in acute distress. Appearance: Normal appearance. She is obese. HENT:      Head: Normocephalic and atraumatic. Pulmonary:      Effort: Pulmonary effort is normal. No respiratory distress. Chest:   Breasts:      Right: Normal. No axillary adenopathy or supraclavicular adenopathy. Left: Normal. No axillary adenopathy or supraclavicular adenopathy. Abdominal:      Palpations: Abdomen is soft. Tenderness: There is no abdominal tenderness. Genitourinary:     General: Normal vulva. Exam position: Lithotomy position. Vagina: Vaginal discharge (white) present. Cervix: Normal.      Uterus: Normal.       Adnexa: Right adnexa normal and left adnexa normal.   Musculoskeletal:         General: Normal range of motion. Cervical back: Normal range of motion. Lymphadenopathy:      Upper Body:      Right upper body: No supraclavicular or axillary adenopathy.       Left upper body: No supraclavicular or axillary adenopathy. Skin:     General: Skin is warm and dry. Findings: No rash. Neurological:      General: No focal deficit present. Mental Status: She is alert and oriented to person, place, and time. Psychiatric:         Mood and Affect: Mood normal.         Behavior: Behavior normal.         Thought Content: Thought content normal.         Results for orders placed or performed in visit on 04/14/22   POCT Urinalysis no Micro   Result Value Ref Range    Color, UA      Clarity, UA      Glucose, UA POC neg     Bilirubin, UA      Ketones, UA      Spec Grav, UA      Blood, UA POC 2+     pH, UA 7.5     Protein, UA POC 2+     Urobilinogen, UA      Leukocytes, UA + -     Nitrite, UA neg        Assessment and Plan   Diagnosis Orders   1. Women's annual routine gynecological examination     2. Frequent urination  POCT Urinalysis no Micro    Culture, Urine    nitrofurantoin, macrocrystal-monohydrate, (MACROBID) 100 MG capsule   3. Dysuria  POCT Urinalysis no Micro    Culture, Urine    nitrofurantoin, macrocrystal-monohydrate, (MACROBID) 100 MG capsule   4. Screening examination for STD (sexually transmitted disease)  Vaginal Pathogens Probes *A    C.trachomatis N.gonorrhoeae DNA   5. Vaginal discharge  Boric Acid Vaginal 600 MG SUPP   6. Obesity (BMI 30-39. 9)       G/c swab, bv panel, urine culture. Sending macrobid in meantime, also boric acid suppositories to try for recurrent bv. Will contact if need to switch or discontinue antibiotic. Reviewed vaginal hygiene measures such as: loose bottoms/cotton underwear, external use of gentle unscented products, wiping front to back, healthy diet, adequate water intake. Patient encouraged to call or return if symptoms persist or worsen. Pt would like STD screening, is unable to complete bloodwork today due to time constraints but may come back for labwork-only visit in the future.     No other concerns/complaints today    Return if symptoms worsen or fail to improve.     Lucille Kwong PA-C

## 2022-04-15 LAB
C TRACH DNA GENITAL QL NAA+PROBE: NEGATIVE
CANDIDA SPECIES, DNA PROBE: ABNORMAL
GARDNERELLA VAGINALIS, DNA PROBE: ABNORMAL
N. GONORRHOEAE DNA: NEGATIVE
TRICHOMONAS VAGINALIS DNA: ABNORMAL
URINE CULTURE, ROUTINE: NORMAL

## 2022-04-18 DIAGNOSIS — B96.89 BACTERIAL VAGINOSIS: Primary | ICD-10-CM

## 2022-04-18 DIAGNOSIS — B37.31 VAGINAL CANDIDIASIS: ICD-10-CM

## 2022-04-18 DIAGNOSIS — N76.0 BACTERIAL VAGINOSIS: Primary | ICD-10-CM

## 2022-04-18 RX ORDER — FLUCONAZOLE 150 MG/1
150 TABLET ORAL ONCE
Qty: 1 TABLET | Refills: 0 | Status: SHIPPED | OUTPATIENT
Start: 2022-04-18 | End: 2022-04-18

## 2022-04-18 RX ORDER — METRONIDAZOLE 500 MG/1
500 TABLET ORAL 2 TIMES DAILY
Qty: 14 TABLET | Refills: 0 | Status: SHIPPED | OUTPATIENT
Start: 2022-04-18 | End: 2022-04-25

## 2022-06-07 ENCOUNTER — TELEPHONE (OUTPATIENT)
Dept: OBGYN | Age: 27
End: 2022-06-07

## 2022-06-07 NOTE — TELEPHONE ENCOUNTER
Pt delivered 08/09/21. Pt states she had has high BP. Pt states her she stopped her BP rx (unsure of when) and just started taking it again today. Pt checked her BP today and it was 156/111. Pt would like to knwo beth you recommend?

## 2022-08-05 ENCOUNTER — HOSPITAL ENCOUNTER (OUTPATIENT)
Dept: SLEEP CENTER | Age: 27
Discharge: HOME | End: 2022-08-05
Payer: COMMERCIAL

## 2022-08-05 VITALS — BODY MASS INDEX: 38.98 KG/M2 | HEIGHT: 63 IN | WEIGHT: 220 LBS

## 2022-08-05 DIAGNOSIS — R06.83 SNORING: ICD-10-CM

## 2022-08-05 DIAGNOSIS — G47.10 HYPERSOMNOLENCE: Primary | ICD-10-CM

## 2022-08-05 PROCEDURE — 99211 OFF/OP EST MAY X REQ PHY/QHP: CPT

## 2022-08-05 ASSESSMENT — SLEEP AND FATIGUE QUESTIONNAIRES
HOW LIKELY ARE YOU TO NOD OFF OR FALL ASLEEP IN A CAR, WHILE STOPPED FOR A FEW MINUTES IN TRAFFIC: 0
HOW LIKELY ARE YOU TO NOD OFF OR FALL ASLEEP WHILE LYING DOWN TO REST IN THE AFTERNOON WHEN CIRCUMSTANCES PERMIT: 3
HOW LIKELY ARE YOU TO NOD OFF OR FALL ASLEEP WHILE SITTING AND TALKING TO SOMEONE: 0
HOW LIKELY ARE YOU TO NOD OFF OR FALL ASLEEP WHILE WATCHING TV: 0
HOW LIKELY ARE YOU TO NOD OFF OR FALL ASLEEP WHILE SITTING INACTIVE IN A PUBLIC PLACE: 0
ESS TOTAL SCORE: 8
HOW LIKELY ARE YOU TO NOD OFF OR FALL ASLEEP WHILE SITTING QUIETLY AFTER LUNCH WITHOUT ALCOHOL: 2
NECK CIRCUMFERENCE (INCHES): 14.75
HOW LIKELY ARE YOU TO NOD OFF OR FALL ASLEEP WHILE SITTING AND READING: 3
HOW LIKELY ARE YOU TO NOD OFF OR FALL ASLEEP WHEN YOU ARE A PASSENGER IN A CAR FOR AN HOUR WITHOUT A BREAK: 0

## 2022-08-05 NOTE — PROGRESS NOTES
Venus Tao MD, Richmond Pierre MD, Javier Parekh MD, [facility]      [address]   [address], [address] [facility]   PH: [phone]  F: [phone]     Subjective:     Patient ID: Naima Yun is a 28 y.o. female, referred to the sleep center for consultation with a sleep specialist.     Reason for Consultation/Chief Complaint:   Chief Complaint   Patient presents with    Snoring     R06.83      Hypertension     I10      Fatigue     R53.83       Referring physician:  Dr.C. Mingo Vergara MD    Symptoms:   [x]  Snoring                                                                [x]  Dry Mouth  [x]  Choking                                                               []  Morning Headaches  [x]  Gasping for Air                                                    []  Trouble Falling asleep  [x]  Tired during the daytime                                     []  Trouble Staying Asleep  [x]  Tired when you wake up                                     [x]  Weight Gain in Last 5 Years  []  Wake up frequently at night                                []  Weight Loss in Last 5 Years  [x]  Shortness Of Breath                                            []  Shift Worker  []  Coughing                                                             [x]  Smoker (Previous or Current)  []  Chest Pain                                                           [x]  Anxiety  []  Trouble keeping your legs still at night                []  Depression  []  Kicking your legs in your sleep                            []  Insomnia            []  Other:     Significant Co-morbidities:  []  Congestive Heart Failure     []  COPD         []  Stroke (Past 30 Days)      []  Supplemental Oxygen Usage       []  Cognitive Impairment      []  Neuromuscular Problems  []  Epilepsy/Neurological Disorders         Duration of Sleep Problems:    History:    Social History     Socioeconomic History    Marital status: Single     Spouse name: Not on file    Number of children: Not on file    Years of education: Not on file    Highest education level: Not on file   Occupational History    Not on file   Tobacco Use    Smoking status: Every Day     Packs/day: 1.00     Types: Cigarettes     Start date: 2013    Smokeless tobacco: Never   Vaping Use    Vaping Use: Never used   Substance and Sexual Activity    Alcohol use: Yes     Comment: occ    Drug use: No    Sexual activity: Yes     Partners: Male   Other Topics Concern    Not on file   Social History Narrative    Not on file     Social Determinants of Health     Financial Resource Strain: High Risk    Difficulty of Paying Living Expenses: Hard   Food Insecurity: No Food Insecurity    Worried About Running Out of Food in the Last Year: Never true    Ran Out of Food in the Last Year: Never true   Transportation Needs: Not on file   Physical Activity: Not on file   Stress: Not on file   Social Connections: Not on file   Intimate Partner Violence: Not on file   Housing Stability: Not on file       Prior to Admission medications    Medication Sig Start Date End Date Taking?  Authorizing Provider   albuterol sulfate  (90 Base) MCG/ACT inhaler INHALE 2 PUFFS PO EVERY 4-6 HOURS PRN 12/15/19  Yes Historical Provider, MD   ferrous sulfate (IRON 325) 325 (65 Fe) MG tablet Take 325 mg by mouth daily (with breakfast)  Patient not taking: Reported on 8/5/2022    Historical Provider, MD   Boric Acid Vaginal 600 MG SUPP Place 1 suppository vaginally at bedtime  Patient not taking: Reported on 8/5/2022 4/14/22   Génesis uMrphy PA-C   sertraline (ZOLOFT) 50 MG tablet Take 50 mg by mouth daily   Patient not taking: Reported on 8/5/2022    Historical Provider, MD   amLODIPine (NORVASC) 10 MG tablet Take 10 mg by mouth daily  Patient not taking: Reported on 8/5/2022    Historical Provider, MD   folic acid (FOLVITE) 400 MCG tablet Take 400 mcg by mouth daily  Patient not taking: Reported on 8/5/2022    Historical Provider, MD   tranexamic acid (LYSTEDA) 650 MG TABS tablet Take 2 tablets by mouth 3 times daily for 5 days 9/20/21 9/25/21  Divina Luong MD   NIFEdipine (PROCARDIA XL) 30 MG extended release tablet Take 30 mg by mouth daily  Patient not taking: No sig reported    Historical Provider, MD   escitalopram (LEXAPRO) 20 MG tablet Take 0.5 tablets by mouth daily  Patient not taking: No sig reported 8/3/21   Dwayne Vang APRN - CNP   buPROPion (WELLBUTRIN XL) 150 MG extended release tablet Take 150 mg by mouth every morning  Patient not taking: Reported on 8/5/2022    Historical Provider, MD   aspirin 81 MG chewable tablet Take 81 mg by mouth daily  Patient not taking: No sig reported    Historical Provider, MD   hydrOXYzine (VISTARIL) 25 MG capsule Take 1 capsule by mouth 4 times daily as needed for Anxiety  Patient not taking: Reported on 8/5/2022 5/6/21   Miki Salas MD   albuterol sulfate HFA (PROVENTIL HFA) 108 (90 Base) MCG/ACT inhaler Inhale 2 puffs into the lungs every 4 hours as needed for Wheezing or Shortness of Breath With spacer (and mask if indicated). Thanks.  5/4/21 6/3/21  Gabe Hanks DO   Prenatal MV-Min-Fe Fum-FA-DHA (PRENATAL 1) 30-0.975-200 MG CAPS Take 1 tablet by mouth daily  Patient not taking: No sig reported 6/6/19   Anna Liz MD       Allergies as of 08/05/2022 - Fully Reviewed 08/05/2022   Allergen Reaction Noted    Latex Itching 04/14/2022    Gold Rash 07/31/2015    Silver Rash 07/31/2015       Patient Active Problem List   Diagnosis    Thrombosed external hemorrhoid    Anal pain    Mild preeclampsia, third trimester    Kidney stone    Hypokalemia    Hypomagnesemia    Dichorionic diamniotic twin pregnancy in third trimester    Preeclampsia    Menometrorrhagia    Obesity    Frequent urination    Dysuria    Snoring    Hypersomnolence       Past Medical History: Diagnosis Date    ADHD     Anxiety     Anxiety disorder     Asthma     Depression     Dyspareunia, female     Dysuria     Endometriosis     Frequent urination     Heart abnormality     heart murmur    Herpes simplex virus (HSV) infection     History of irregular menstrual bleeding     Hx of chlamydia infection     IBS (irritable bowel syndrome)     Kidney stones     Obesity     Preeclampsia        Past Surgical History:   Procedure Laterality Date     SECTION       SECTION N/A 2020     SECTION performed by Sj Francis MD at [facility] L&D OR    CYSTOSCOPY Right 2021    CYSTOSCOPY, RIGHT URETEROSCOPY performed by Shaan Sung MD at [facility]         Family History   Problem Relation Age of Onset    Hypertension Mother     Diabetes Father     Hypertension Father     Cancer Other          Objective:     Vitals:    22 1457   Weight: 220 lb (99.8 kg)   Height: 5' 3\" (1.6 m)     Body mass index is 38.97 kg/m². Neck - Neck circumference (Inches): 14.75  Inches  [place] - Total score: 8    REVIEW OF SYSTEMS:  Gen: No distress. Eyes: PERRL. No sclera icterus. No conjunctival injection. ENT: No discharge. Pharynx clear. External appearance of ears and nose normal.  Neck: Trachea midline. No obvious mass. Resp: No accessory muscle use. No crackles. No wheezes. No rhonchi. No dullness on percussion. CV: Regular rate. Regular rhythm. No murmur or rub. No edema. GI: Non-tender. Non-distended. No hernia. Skin: Warm, dry, normal texture and turgor. No nodule on exposed extremities. Lymph: No cervical LAD. No supraclavicular LAD. M/S: No cyanosis. No clubbing. No joint deformity. Psych: Oriented x 3. No anxiety. Awake. Alert. Intact judgement and insight. Mallampati Airway Classification:   []1 []2 []3 [x]4          Previous CPAP Usage:    [x]  Patient has never worn PAP. []  Patient has worn PAP previously but discontinued use.   []  Current PAP user. Assessment:      Diagnosis:  EDS,Snoring R/O KELLY. Plan: 1. Sleep study. 2.Sleep hygiene. 3.RTO 1 mth.

## 2022-08-30 ENCOUNTER — TELEPHONE (OUTPATIENT)
Dept: OBGYN | Age: 27
End: 2022-08-30

## 2022-08-30 DIAGNOSIS — A60.04 HERPES SIMPLEX VULVOVAGINITIS: ICD-10-CM

## 2022-08-30 RX ORDER — VALACYCLOVIR HYDROCHLORIDE 500 MG/1
500 TABLET, FILM COATED ORAL 2 TIMES DAILY
Qty: 60 TABLET | Refills: 11 | Status: SHIPPED | OUTPATIENT
Start: 2022-08-30

## 2023-03-28 RX ORDER — VALACYCLOVIR HYDROCHLORIDE 500 MG/1
500 TABLET, FILM COATED ORAL 2 TIMES DAILY
Qty: 60 TABLET | Refills: 11 | Status: SHIPPED | OUTPATIENT
Start: 2023-03-28

## 2023-03-29 ENCOUNTER — OFFICE VISIT (OUTPATIENT)
Dept: OBGYN | Age: 28
End: 2023-03-29
Payer: COMMERCIAL

## 2023-03-29 VITALS
SYSTOLIC BLOOD PRESSURE: 163 MMHG | DIASTOLIC BLOOD PRESSURE: 123 MMHG | BODY MASS INDEX: 37.92 KG/M2 | WEIGHT: 214 LBS | HEIGHT: 63 IN

## 2023-03-29 DIAGNOSIS — Z11.3 SCREENING EXAMINATION FOR STD (SEXUALLY TRANSMITTED DISEASE): Primary | ICD-10-CM

## 2023-03-29 DIAGNOSIS — N94.6 DYSMENORRHEA: ICD-10-CM

## 2023-03-29 DIAGNOSIS — N89.8 VAGINAL DISCHARGE: ICD-10-CM

## 2023-03-29 DIAGNOSIS — N92.0 MENORRHAGIA WITH REGULAR CYCLE: ICD-10-CM

## 2023-03-29 DIAGNOSIS — N90.89 VULVAR LESION: ICD-10-CM

## 2023-03-29 DIAGNOSIS — E66.9 OBESITY (BMI 30-39.9): ICD-10-CM

## 2023-03-29 DIAGNOSIS — R35.0 URINARY FREQUENCY: ICD-10-CM

## 2023-03-29 LAB — HBV SURFACE AG SERPL QL IA: NORMAL

## 2023-03-29 PROCEDURE — G8427 DOCREV CUR MEDS BY ELIG CLIN: HCPCS

## 2023-03-29 PROCEDURE — 99214 OFFICE O/P EST MOD 30 MIN: CPT

## 2023-03-29 PROCEDURE — 4004F PT TOBACCO SCREEN RCVD TLK: CPT

## 2023-03-29 PROCEDURE — G8484 FLU IMMUNIZE NO ADMIN: HCPCS

## 2023-03-29 PROCEDURE — G8417 CALC BMI ABV UP PARAM F/U: HCPCS

## 2023-03-29 RX ORDER — VALACYCLOVIR HYDROCHLORIDE 1 G/1
1000 TABLET, FILM COATED ORAL DAILY
Qty: 90 TABLET | Refills: 1 | Status: SHIPPED | OUTPATIENT
Start: 2023-03-29

## 2023-03-29 ASSESSMENT — ENCOUNTER SYMPTOMS
GASTROINTESTINAL NEGATIVE: 1
RESPIRATORY NEGATIVE: 1
ABDOMINAL PAIN: 0

## 2023-03-29 NOTE — PROGRESS NOTES
left adnexa normal.          Comments: Ulcerated lesion as marked above  Musculoskeletal:         General: Normal range of motion. Cervical back: Normal range of motion. Skin:     General: Skin is warm and dry. Neurological:      General: No focal deficit present. Mental Status: She is alert and oriented to person, place, and time. Psychiatric:         Mood and Affect: Mood normal.         Speech: Speech normal.         Behavior: Behavior normal.         Thought Content: Thought content normal.       No results found for this visit on 03/29/23. ASSESSMENT AND PLAN   Diagnosis Orders   1. Screening examination for STD (sexually transmitted disease)  Culture, HSV, Reflex to typing    Hepatitis B Surface Antigen    RPR    Herpes Simplex Virus,I/II,IgG    HIV Screen    Vaginal Pathogens Probes *A    C.trachomatis N.gonorrhoeae DNA      2. Vaginal discharge        3. Vulvar lesion  valACYclovir (VALTREX) 1 g tablet      4. Dysmenorrhea        5. Menorrhagia with regular cycle        6. Obesity (BMI 30-39.9)        7. Urinary frequency  Culture, Urine        Std panel, hsv culture, urine culture, swabs, sending 1 gm valtrex to take daily. Discussed D&C vs ablation, pregnancy being contraindicated following endometrial ablation. Also OCPs/hormonal contraception as possible aid to symptoms. Pt will watch symptoms over the next few weeks and possibly schedule physician consult to review options further. Return if symptoms worsen or fail to improve.     Ladan Walker PA-C

## 2023-03-30 DIAGNOSIS — B96.89 BACTERIAL VAGINOSIS: Primary | ICD-10-CM

## 2023-03-30 DIAGNOSIS — N76.0 BACTERIAL VAGINOSIS: Primary | ICD-10-CM

## 2023-03-30 LAB
CANDIDA DNA VAG QL NAA+PROBE: ABNORMAL
G VAGINALIS DNA SPEC QL NAA+PROBE: ABNORMAL
HERPES SIMPLEX VIRUS 1 IGG: POSITIVE
HERPES SIMPLEX VIRUS 2 IGG: POSITIVE
HIV 1+2 AB+HIV1 P24 AG SERPL QL IA: NORMAL
HIV 2 AB SERPL QL IA: NORMAL
HIV1 AB SERPL QL IA: NORMAL
HIV1 P24 AG SERPL QL IA: NORMAL
REAGIN+T PALLIDUM IGG+IGM SERPL-IMP: NORMAL
T VAGINALIS DNA VAG QL NAA+PROBE: ABNORMAL

## 2023-03-30 RX ORDER — METRONIDAZOLE 500 MG/1
500 TABLET ORAL 2 TIMES DAILY
Qty: 14 TABLET | Refills: 0 | Status: SHIPPED | OUTPATIENT
Start: 2023-03-30 | End: 2023-04-06

## 2023-03-31 LAB
BACTERIA UR CULT: ABNORMAL
C TRACH DNA CVX QL NAA+PROBE: NEGATIVE
N GONORRHOEA DNA CERV MUCUS QL NAA+PROBE: NEGATIVE
ORGANISM: ABNORMAL

## 2023-04-01 LAB
FINAL REPORT: NORMAL
PRELIMINARY: NORMAL

## 2023-04-03 DIAGNOSIS — N39.0 URINARY TRACT INFECTION WITHOUT HEMATURIA, SITE UNSPECIFIED: Primary | ICD-10-CM

## 2023-04-03 RX ORDER — SULFAMETHOXAZOLE AND TRIMETHOPRIM 800; 160 MG/1; MG/1
1 TABLET ORAL 2 TIMES DAILY
Qty: 10 TABLET | Refills: 0 | Status: SHIPPED | OUTPATIENT
Start: 2023-04-03 | End: 2023-04-08

## 2023-05-08 ENCOUNTER — HOSPITAL ENCOUNTER (OUTPATIENT)
Dept: PHYSICAL THERAPY | Age: 28
Setting detail: THERAPIES SERIES
Discharge: HOME | End: 2023-05-08
Payer: COMMERCIAL

## 2023-05-08 PROCEDURE — 97110 THERAPEUTIC EXERCISES: CPT

## 2023-05-08 PROCEDURE — 97161 PT EVAL LOW COMPLEX 20 MIN: CPT

## 2023-05-08 ASSESSMENT — PAIN DESCRIPTION - LOCATION: LOCATION: KNEE

## 2023-05-08 ASSESSMENT — PAIN SCALES - GENERAL: PAINLEVEL_OUTOF10: 0

## 2023-05-08 ASSESSMENT — PAIN DESCRIPTION - DESCRIPTORS: DESCRIPTORS: SHOOTING;SHARP;ACHING

## 2023-05-08 ASSESSMENT — PAIN DESCRIPTION - ORIENTATION: ORIENTATION: RIGHT

## 2023-05-08 ASSESSMENT — PAIN DESCRIPTION - PAIN TYPE: TYPE: CHRONIC PAIN

## 2023-05-08 NOTE — PROGRESS NOTES
Physical Therapy: Initial Evaluation    Patient: Stanley Pedraza (69 y.o. female)   Examination Date:   Plan of Care Certification Period: 2023 to        :  1995 ;    Confirmed: Yes MRN: [id number]  CSN: [id number]   Insurance: Payor: Joelle Boyd / Plan: Art Teresita / Product Type: *No Product type* /   Insurance ID: [id number] - (Medicaid Managed) Secondary Insurance (if applicable):    Referring Physician: MD NEELAM Lombardo 11   PCP: Parker Lamar MD Visits to Date/Visits Approved:   /      No Show/Cancelled Appts:   /       Medical Diagnosis: Sprain of unspecified site of right knee, subsequent encounter [S83.91XD] R knee sprain  Treatment Diagnosis: R knee pain, PFPS, patellar hyper mobility, knee hyper ext     PERTINENT MEDICAL HISTORY   Patient Assessed for Rehabilitation Services: Yes  Self reported health status[de-identified] Good    Medical History: Chart Reviewed: Yes   Past Medical History:   Diagnosis Date    ADHD     Anxiety     Anxiety disorder     Asthma     Depression     Dyspareunia, female     Dysuria     Endometriosis     Frequent urination     Heart abnormality     heart murmur    Herpes simplex virus (HSV) infection     History of irregular menstrual bleeding     Hx of chlamydia infection     IBS (irritable bowel syndrome)     Kidney stones     Obesity     Preeclampsia      Surgical History:   Past Surgical History:   Procedure Laterality Date     SECTION       SECTION N/A 2020     SECTION performed by Agnes Garcia MD at [facility] L&D OR    CYSTOSCOPY Right 2021    CYSTOSCOPY, RIGHT URETEROSCOPY performed by Bk Currie MD at [address] [place] [address]         Medications:   Current Outpatient Medications:     valACYclovir (VALTREX) 1 g tablet, Take 1 tablet by mouth daily, Disp: 90 tablet, Rfl: 1    valACYclovir (VALTREX) 500 MG tablet, Take 1 tablet by mouth 2 times daily, Disp: 60 tablet, Rfl: 11    ferrous

## 2023-05-08 NOTE — FLOWSHEET NOTE
Outpatient Physical Therapy  [place]           [x] Phone: [phone]   Fax: [phone]  [place]           [] Phone: [phone]   Fax: [phone]        Physical Therapy Daily Treatment Note  Date:  2023    Patient Name:  Ela Bran    :  1995  MRN: [id number]  Restrictions/Precautions: No data recorded      Diagnosis:   Sprain of unspecified site of right knee, subsequent encounter [I83.91XD] Diagnosis: R knee sprain  Date of Injury/Surgery:   Treatment Diagnosis:  R knee pain, PFPS, patellar hyper mobility, knee hyper ext  Insurance/Certification information: [facility] ( precert needed)  Referring Physician:  MD NEELAM Christian 11   PCP: Ling Miner MD  Next Doctor Visit:    Plan of care signed (Y/N):   no  Outcome Measure: LEFS  60 pts  Visit# / total visits:   -  Pain level: 0-10 /10  Currently 1/10  Goals:     Patient goals: stop the pain around the knee cap with increased activity  Short term goals  Time Frame for Short term goals: 4 weeks  1. ind wht HEP for patellar control and LE strength  2. avg pain 2/10 with playing with kids, stairs, and sit to stand  3. reports 50% less tenderness in the patellar area R knee  4. pt to beind with patellar control brace as needed     Long Term Goals  Time Frame for Long Term Goals: 6-8 eeks  1. ind with gym workout  2. squat to 90/90 wiith good form and  1/10 or less R knee pain  3. reports 75% better overall with all activity including dancing , work, household acivity and plaing with her kids  4. MMT R knee flex , ext, hip abd 4 +/5  5. LEFS improved to 70 pts or better      Summary of Evaluation:  Assessment: Pt appears with R knee hypermobility in both the patella and knee hyper ext, she appears to have greater joint mobility in her other joints as well. Her mobility is causing her difficulty with on and off pain and activity tolerance.  She will require greater muscular control of her knee to be able to

## 2023-05-08 NOTE — PLAN OF CARE
Cold/hotpack [] Iontophoresis   [x] Instruction in HEP      [x] Vasopneumatic    [] Dry Needling  [x] Manual Therapy               [] Aquatic Therapy       Other:          Frequency/Duration:  # Days per week: [x] 1 day # Weeks: [] 1 week [] 5 weeks     [x] 2 days   [] 2 weeks [] 6 weeks     [] 3 days   [] 3 weeks [] 7 weeks     [] 4 days   [x] 4 weeks [x] 8 weeks         [] 9 weeks [] 10 weeks         [] 11 weeks [] 12 weeks    Rehab Potential/Progress: [] Excellent [x] Good [] Fair  [] Poor     Goals:    Patient goals: stop the pain around the knee cap with increased activity  Short term goals  Time Frame for Short term goals: 4 weeks  1. ind wht HEP for patellar control and LE strength  2. avg pain 2/10 with playing with kids, stairs, and sit to stand  3. reports 50% less tenderness in the patellar area R knee  4. pt to beind with patellar control brace as needed     Long Term Goals  Time Frame for Long Term Goals: 6-8 eeks  1. ind with gym workout  2. squat to 90/90 wiith good form and  1/10 or less R knee pain  3. reports 75% better overall with all activity including dancing , work, household acivity and plaing with her kids  4. MMT R knee flex , ext, hip abd 4 +/5  5. LEFS improved to 70 pts or better        Electronically signed by:  Do Castro PT,  5/8/2023, 6:51 PM        If you have any questions or concerns, please don't hesitate to call.   Thank you for your referral.      Physician Signature:________________________________Date:_________ TIME: _____  By signing above, therapists plan is approved by physician

## 2023-05-09 ENCOUNTER — OFFICE VISIT (OUTPATIENT)
Dept: OBGYN | Age: 28
End: 2023-05-09
Payer: COMMERCIAL

## 2023-05-09 VITALS
DIASTOLIC BLOOD PRESSURE: 105 MMHG | BODY MASS INDEX: 38.27 KG/M2 | HEIGHT: 63 IN | WEIGHT: 216 LBS | SYSTOLIC BLOOD PRESSURE: 150 MMHG

## 2023-05-09 DIAGNOSIS — N74 FEMALE PELVIC INFLAMMATORY DISORDERS IN DISEASES CLASSIFIED ELSEWHERE: ICD-10-CM

## 2023-05-09 DIAGNOSIS — G89.29 CHRONIC FEMALE PELVIC PAIN: ICD-10-CM

## 2023-05-09 DIAGNOSIS — N92.1 MENOMETRORRHAGIA: ICD-10-CM

## 2023-05-09 DIAGNOSIS — R10.2 CHRONIC FEMALE PELVIC PAIN: ICD-10-CM

## 2023-05-09 DIAGNOSIS — F41.1 GAD (GENERALIZED ANXIETY DISORDER): ICD-10-CM

## 2023-05-09 DIAGNOSIS — Z01.419 ENCOUNTER FOR ANNUAL ROUTINE GYNECOLOGICAL EXAMINATION: Primary | ICD-10-CM

## 2023-05-09 DIAGNOSIS — I10 HYPERTENSION, ESSENTIAL: ICD-10-CM

## 2023-05-09 DIAGNOSIS — N89.8 VAGINAL DISCHARGE: ICD-10-CM

## 2023-05-09 PROCEDURE — 3080F DIAST BP >= 90 MM HG: CPT | Performed by: OBSTETRICS & GYNECOLOGY

## 2023-05-09 PROCEDURE — 3077F SYST BP >= 140 MM HG: CPT | Performed by: OBSTETRICS & GYNECOLOGY

## 2023-05-09 PROCEDURE — 99395 PREV VISIT EST AGE 18-39: CPT | Performed by: OBSTETRICS & GYNECOLOGY

## 2023-05-09 SDOH — ECONOMIC STABILITY: HOUSING INSECURITY
IN THE LAST 12 MONTHS, WAS THERE A TIME WHEN YOU DID NOT HAVE A STEADY PLACE TO SLEEP OR SLEPT IN A SHELTER (INCLUDING NOW)?: NO

## 2023-05-09 SDOH — ECONOMIC STABILITY: INCOME INSECURITY: HOW HARD IS IT FOR YOU TO PAY FOR THE VERY BASICS LIKE FOOD, HOUSING, MEDICAL CARE, AND HEATING?: NOT HARD AT ALL

## 2023-05-09 SDOH — ECONOMIC STABILITY: FOOD INSECURITY: WITHIN THE PAST 12 MONTHS, YOU WORRIED THAT YOUR FOOD WOULD RUN OUT BEFORE YOU GOT MONEY TO BUY MORE.: NEVER TRUE

## 2023-05-09 SDOH — ECONOMIC STABILITY: FOOD INSECURITY: WITHIN THE PAST 12 MONTHS, THE FOOD YOU BOUGHT JUST DIDN'T LAST AND YOU DIDN'T HAVE MONEY TO GET MORE.: NEVER TRUE

## 2023-05-09 ASSESSMENT — PATIENT HEALTH QUESTIONNAIRE - PHQ9
SUM OF ALL RESPONSES TO PHQ QUESTIONS 1-9: 0
1. LITTLE INTEREST OR PLEASURE IN DOING THINGS: 0
SUM OF ALL RESPONSES TO PHQ QUESTIONS 1-9: 0
SUM OF ALL RESPONSES TO PHQ9 QUESTIONS 1 & 2: 0
2. FEELING DOWN, DEPRESSED OR HOPELESS: 0

## 2023-05-09 ASSESSMENT — ENCOUNTER SYMPTOMS
RESPIRATORY NEGATIVE: 1
GASTROINTESTINAL NEGATIVE: 1
ALLERGIC/IMMUNOLOGIC NEGATIVE: 1
EYES NEGATIVE: 1

## 2023-05-09 NOTE — PRE-CERTIFICATION NOTE
PT APPROVED FOR [address] [facility] CPT CODES 78393  [id number]  [id number]  [phone]    5/8/23-8/8/23

## 2023-05-09 NOTE — PROGRESS NOTES
Medications   Medication Sig Dispense Refill    valACYclovir (VALTREX) 1 g tablet Take 1 tablet by mouth daily 90 tablet 1    hydrOXYzine (VISTARIL) 25 MG capsule Take 1 capsule by mouth 4 times daily as needed for Anxiety 20 capsule 0    valACYclovir (VALTREX) 500 MG tablet Take 1 tablet by mouth 2 times daily 60 tablet 11    ferrous sulfate (IRON 325) 325 (65 Fe) MG tablet Take 325 mg by mouth daily (with breakfast) (Patient not taking: Reported on 8/5/2022)      Boric Acid Vaginal 600 MG SUPP Place 1 suppository vaginally at bedtime (Patient not taking: Reported on 8/5/2022) 90 suppository 2    sertraline (ZOLOFT) 50 MG tablet Take 50 mg by mouth daily  (Patient not taking: Reported on 8/5/2022)      amLODIPine (NORVASC) 10 MG tablet Take 10 mg by mouth daily      folic acid (FOLVITE) 266 MCG tablet Take 400 mcg by mouth daily (Patient not taking: Reported on 8/5/2022)      tranexamic acid (LYSTEDA) 650 MG TABS tablet Take 2 tablets by mouth 3 times daily for 5 days 30 tablet 0    NIFEdipine (PROCARDIA XL) 30 MG extended release tablet Take 30 mg by mouth daily (Patient not taking: No sig reported)      escitalopram (LEXAPRO) 20 MG tablet Take 0.5 tablets by mouth daily (Patient not taking: No sig reported) 30 tablet 0    buPROPion (WELLBUTRIN XL) 150 MG extended release tablet Take 150 mg by mouth every morning (Patient not taking: Reported on 8/5/2022)      aspirin 81 MG chewable tablet Take 81 mg by mouth daily (Patient not taking: No sig reported)      albuterol sulfate HFA (PROVENTIL HFA) 108 (90 Base) MCG/ACT inhaler Inhale 2 puffs into the lungs every 4 hours as needed for Wheezing or Shortness of Breath With spacer (and mask if indicated). Thanks.  1 Inhaler 1    albuterol sulfate  (90 Base) MCG/ACT inhaler INHALE 2 PUFFS PO EVERY 4-6 HOURS PRN      Prenatal MV-Min-Fe Fum-FA-DHA (PRENATAL 1) 30-0.975-200 MG CAPS Take 1 tablet by mouth daily (Patient not taking: No sig reported) 30 capsule 3     No

## 2023-05-11 LAB
C TRACH DNA CVX QL NAA+PROBE: NEGATIVE
CANDIDA DNA VAG QL NAA+PROBE: ABNORMAL
G VAGINALIS DNA SPEC QL NAA+PROBE: ABNORMAL
N GONORRHOEA DNA CERV MUCUS QL NAA+PROBE: NEGATIVE
T VAGINALIS DNA VAG QL NAA+PROBE: ABNORMAL

## 2023-05-15 RX ORDER — METRONIDAZOLE 7.5 MG/G
1 GEL VAGINAL DAILY
Qty: 1 EACH | Refills: 0 | Status: SHIPPED | OUTPATIENT
Start: 2023-05-15 | End: 2023-05-20

## 2023-05-15 RX ORDER — METRONIDAZOLE 500 MG/1
500 TABLET ORAL 2 TIMES DAILY
Qty: 14 TABLET | Refills: 0 | Status: SHIPPED | OUTPATIENT
Start: 2023-05-15 | End: 2023-05-15

## 2023-05-21 NOTE — ANESTHESIA PROCEDURE NOTES
Spinal Block    Patient location during procedure: OR  Start time: 5/5/2021 1:18 PM  End time: 5/5/2021 1:26 PM  Reason for block: primary anesthetic  Staffing  Performed: resident/CRNA   Anesthesiologist: Lorin Rivera MD  Resident/CRNA: MARGARITO Buchanan CRNA  Preanesthetic Checklist  Completed: patient identified, IV checked, site marked, risks and benefits discussed, surgical consent, monitors and equipment checked, pre-op evaluation, timeout performed, anesthesia consent given, oxygen available and patient being monitored  Spinal Block  Patient position: sitting  Prep: Betadine  Patient monitoring: cardiac monitor, continuous pulse ox, continuous capnometry and frequent blood pressure checks  Approach: midline  Location: L4/L5  Provider prep: mask and sterile gloves  Needle  Needle type: Quincke   Needle gauge: 25 G  Needle length: 3.5 in  Assessment  Sensory level: T6  Swirl obtained: Yes  CSF: clear  Attempts: 1  Hemodynamics: stable
yes

## 2023-05-24 ENCOUNTER — HOSPITAL ENCOUNTER (OUTPATIENT)
Dept: PHYSICAL THERAPY | Age: 28
Setting detail: THERAPIES SERIES
Discharge: HOME | End: 2023-05-24
Payer: COMMERCIAL

## 2023-05-24 PROCEDURE — 97110 THERAPEUTIC EXERCISES: CPT

## 2023-05-24 NOTE — FLOWSHEET NOTE
Outpatient Physical Therapy  [place]           [x] Phone: [phone]   Fax: [phone]  [place]           [] Phone: [phone]   Fax: [phone]        Physical Therapy Daily Treatment Note  Date:  2023    Patient Name:  Lachelle Sumner    :  1995  MRN: [id number]  Restrictions/Precautions: No data recorded   Diagnosis:   Sprain of unspecified site of right knee, subsequent encounter [S83.91XD] Diagnosis: R knee sprain  Date of Injury/Surgery:   Treatment Diagnosis:  R knee pain, PFPS, patellar hyper mobility, knee hyper ext  Insurance/Certification information: [facility] PT APPROVED FOR 90 UNITS [place]ACH [place] [id number] 23-23  Referring Physician:  MD NEELAM Salamanca   PCP: Cynthia George MD  Next Doctor Visit:    Plan of care signed (Y/N):   no  Outcome Measure: LEFS  60 pts  Visit# / total visits:    -12  Pain level:      0/10      Goals:     Patient goals: stop the pain around the knee cap with increased activity  Short term goals  Time Frame for Short term goals: 4 weeks  1. ind wht HEP for patellar control and LE strength  2. avg pain 2/10 with playing with kids, stairs, and sit to stand  3. reports 50% less tenderness in the patellar area R knee  4. pt to beind with patellar control brace as needed  Long Term Goals  Time Frame for Long Term Goals: 6-8 eeks  1. ind with gym workout  2. squat to 90/90 wiith good form and  1/10 or less R knee pain  3. reports 75% better overall with all activity including dancing , work, household acivity and plaing with her kids  4. MMT R knee flex , ext, hip abd 4 +/5  5. LEFS improved to 70 pts or better        Summary of Evaluation:  Assessment: Pt appears with R knee hypermobility in both the patella and knee hyper ext, she appears to have greater joint mobility in her other joints as well. Her mobility is causing her difficulty with on and off pain and activity tolerance.  She will require

## 2023-06-01 ENCOUNTER — HOSPITAL ENCOUNTER (OUTPATIENT)
Dept: PHYSICAL THERAPY | Age: 28
Setting detail: THERAPIES SERIES
Discharge: HOME | End: 2023-06-01

## 2023-06-01 NOTE — FLOWSHEET NOTE
Physical Therapy  Cancellation/No-show Note  Patient Name:  Cat Mcintyre  :  1995   Date:  2023  Cancelled visits to date: 0  No-shows to date: 1    For today's appointment patient:  []  Cancelled  []  Rescheduled appointment  [x]  No-show     Reason given by patient:  []  Patient ill  []  Conflicting appointment  []  No transportation    []  Conflict with work  [x]  No reason given  []  Other:     Comments:      Electronically signed by:  Alli Asencio PT,

## 2023-06-06 ENCOUNTER — NURSE ONLY (OUTPATIENT)
Dept: OBGYN | Age: 28
End: 2023-06-06
Payer: COMMERCIAL

## 2023-06-06 DIAGNOSIS — N92.1 MENOMETRORRHAGIA: ICD-10-CM

## 2023-06-06 PROCEDURE — 36415 COLL VENOUS BLD VENIPUNCTURE: CPT | Performed by: OBSTETRICS & GYNECOLOGY

## 2023-06-07 LAB
DEPRECATED RDW RBC AUTO: 14.9 % (ref 12.4–15.4)
FSH SERPL-ACNC: 3 MIU/ML
HCG SERPL QL: NEGATIVE
HCT VFR BLD AUTO: 38.5 % (ref 36–48)
HGB BLD-MCNC: 13.2 G/DL (ref 12–16)
MCH RBC QN AUTO: 27.9 PG (ref 26–34)
MCHC RBC AUTO-ENTMCNC: 34.2 G/DL (ref 31–36)
MCV RBC AUTO: 81.6 FL (ref 80–100)
PLATELET # BLD AUTO: 293 K/UL (ref 135–450)
PMV BLD AUTO: 7.8 FL (ref 5–10.5)
PROLACTIN SERPL IA-MCNC: 9.7 NG/ML
RBC # BLD AUTO: 4.72 M/UL (ref 4–5.2)
TSH SERPL DL<=0.005 MIU/L-ACNC: 1.15 UIU/ML (ref 0.27–4.2)
WBC # BLD AUTO: 4.7 K/UL (ref 4–11)

## 2023-06-08 LAB
SHBG SERPL-SCNC: 60 NMOL/L (ref 30–135)
TESTOST FREE SERPL-MCNC: 1.4 PG/ML (ref 0.8–7.4)
TESTOST SERPL-MCNC: 12 NG/DL (ref 20–70)

## 2023-06-28 ENCOUNTER — APPOINTMENT (RX ONLY)
Dept: URBAN - METROPOLITAN AREA CLINIC 379 | Facility: CLINIC | Age: 28
Setting detail: DERMATOLOGY
End: 2023-06-28

## 2023-07-09 DIAGNOSIS — N92.1 MENOMETRORRHAGIA: ICD-10-CM

## 2023-07-09 DIAGNOSIS — G89.29 CHRONIC FEMALE PELVIC PAIN: ICD-10-CM

## 2023-07-09 DIAGNOSIS — R10.2 CHRONIC FEMALE PELVIC PAIN: ICD-10-CM

## 2023-07-09 DIAGNOSIS — N94.6 DYSMENORRHEA: ICD-10-CM

## 2023-07-09 DIAGNOSIS — F32.81 PMDD (PREMENSTRUAL DYSPHORIC DISORDER): ICD-10-CM

## 2023-07-10 ENCOUNTER — HOSPITAL ENCOUNTER (OUTPATIENT)
Dept: PHYSICAL THERAPY | Age: 28
Discharge: HOME | End: 2023-07-10

## 2023-07-10 RX ORDER — ACETAMINOPHEN AND CODEINE PHOSPHATE 120; 12 MG/5ML; MG/5ML
SOLUTION ORAL
Qty: 28 TABLET | Refills: 11 | OUTPATIENT
Start: 2023-07-10

## 2023-07-10 NOTE — FLOWSHEET NOTE
Physical Therapy  Cancellation/No-show Note  Patient Name:  Talat Weaver  :  1995   Date:  7/10/2023  Cancelled visits to date: 2 (PT cancelled 1)  No-shows to date: 1    For today's appointment patient:  [x]  Cancelled  []  Rescheduled appointment  []  No-show     Reason given by patient:  []  Patient ill  []  Conflicting appointment  []  No transportation    []  Conflict with work  []  No reason given  [x]  Other:     Comments:  Patient hasn't been seen since by PT since beginning of May. Last appt in general . LM advising her we are cancelling today's appt due to not being seen in almost 2 months. Reminded her of her next scheduled appt with primary PT . Advised her to contact our office to let us know that she received the message.      Electronically signed by:  Nanci Sargent PTA    9:06 AM  7/10/2023

## 2023-07-17 NOTE — PROGRESS NOTES
7/17/23 - Patient stated she is on vacation and needs to reschedule surgery (7/19/23). She was instructed to call the office and I confirmed she has the office #.

## 2023-07-18 ENCOUNTER — ANESTHESIA EVENT (OUTPATIENT)
Dept: OPERATING ROOM | Age: 28
End: 2023-07-18
Payer: COMMERCIAL

## 2023-07-18 ENCOUNTER — OFFICE VISIT (OUTPATIENT)
Dept: OBGYN | Age: 28
End: 2023-07-18
Payer: COMMERCIAL

## 2023-07-18 VITALS
DIASTOLIC BLOOD PRESSURE: 98 MMHG | HEIGHT: 63 IN | BODY MASS INDEX: 37.21 KG/M2 | WEIGHT: 210 LBS | SYSTOLIC BLOOD PRESSURE: 148 MMHG

## 2023-07-18 DIAGNOSIS — R10.2 CHRONIC FEMALE PELVIC PAIN: Primary | ICD-10-CM

## 2023-07-18 DIAGNOSIS — N92.1 MENOMETRORRHAGIA: ICD-10-CM

## 2023-07-18 DIAGNOSIS — G89.29 CHRONIC FEMALE PELVIC PAIN: Primary | ICD-10-CM

## 2023-07-18 DIAGNOSIS — B96.89 BV (BACTERIAL VAGINOSIS): ICD-10-CM

## 2023-07-18 DIAGNOSIS — N76.0 BV (BACTERIAL VAGINOSIS): ICD-10-CM

## 2023-07-18 DIAGNOSIS — N94.6 DYSMENORRHEA: ICD-10-CM

## 2023-07-18 PROCEDURE — G8427 DOCREV CUR MEDS BY ELIG CLIN: HCPCS | Performed by: OBSTETRICS & GYNECOLOGY

## 2023-07-18 PROCEDURE — 99214 OFFICE O/P EST MOD 30 MIN: CPT | Performed by: OBSTETRICS & GYNECOLOGY

## 2023-07-18 PROCEDURE — 4004F PT TOBACCO SCREEN RCVD TLK: CPT | Performed by: OBSTETRICS & GYNECOLOGY

## 2023-07-18 PROCEDURE — G8417 CALC BMI ABV UP PARAM F/U: HCPCS | Performed by: OBSTETRICS & GYNECOLOGY

## 2023-07-18 RX ORDER — CLINDAMYCIN PHOSPHATE 20 MG/G
1 CREAM VAGINAL NIGHTLY
Qty: 40 G | Refills: 0 | Status: ON HOLD | OUTPATIENT
Start: 2023-07-18 | End: 2023-07-19 | Stop reason: HOSPADM

## 2023-07-18 RX ORDER — UREA 10 %
800 LOTION (ML) TOPICAL DAILY
COMMUNITY
Start: 2023-07-10

## 2023-07-18 RX ORDER — METOPROLOL SUCCINATE 25 MG/1
25 TABLET, EXTENDED RELEASE ORAL DAILY
COMMUNITY

## 2023-07-18 RX ORDER — LORAZEPAM 0.5 MG/1
0.5 TABLET ORAL 2 TIMES DAILY PRN
COMMUNITY
Start: 2023-07-03

## 2023-07-18 RX ORDER — ERGOCALCIFEROL 1.25 MG/1
CAPSULE ORAL
COMMUNITY
Start: 2023-07-05

## 2023-07-18 RX ORDER — DULOXETIN HYDROCHLORIDE 30 MG/1
CAPSULE, DELAYED RELEASE ORAL DAILY
COMMUNITY
Start: 2023-05-11

## 2023-07-18 ASSESSMENT — ENCOUNTER SYMPTOMS
GASTROINTESTINAL NEGATIVE: 1
ALLERGIC/IMMUNOLOGIC NEGATIVE: 1
EYES NEGATIVE: 1
RESPIRATORY NEGATIVE: 1

## 2023-07-18 ASSESSMENT — LIFESTYLE VARIABLES: SMOKING_STATUS: 1

## 2023-07-18 NOTE — ANESTHESIA PRE PROCEDURE
Department of Anesthesiology  Preprocedure Note       Name:  Talat Weaver   Age:  33 y.o.  :  1995                                          MRN:  [id number]         Date:  2023      Surgeon: Aron Delarosa):  Rob Bowen MD    Procedure: Procedure(s):  LAPAROSCOPY EXPLORATORY  DILATATION AND CURETTAGE HYSTEROSCOPY    Medications prior to admission:   Prior to Admission medications    Medication Sig Start Date End Date Taking? Authorizing Provider   metoprolol succinate (TOPROL XL) 25 MG extended release tablet Take 1 tablet by mouth daily    Historical Provider, MD   DULoxetine (CYMBALTA) 30 MG extended release capsule Take by mouth daily 23   Historical Provider, MD   vitamin D (ERGOCALCIFEROL) 1.25 MG (03341 UT) CAPS capsule PLEASE SEE ATTACHED FOR DETAILED DIRECTIONS 23   Historical Provider, MD   folic acid (FOLVITE) 586 MCG tablet Take 1 tablet by mouth daily 7/10/23   Historical Provider, MD   LORazepam (ATIVAN) 0.5 MG tablet Take 1 tablet by mouth 2 times daily as needed.  Max Daily Amount: 1 mg 7/3/23   Historical Provider, MD   norethindrone (JONY) 0.35 MG tablet Take 1 tablet by mouth daily 23   Rob Bowen MD   valACYclovir (VALTREX) 1 g tablet Take 1 tablet by mouth daily 3/29/23   Jhoan Castillo PA-C   valACYclovir (VALTREX) 500 MG tablet Take 1 tablet by mouth 2 times daily 3/28/23   Jhoan Castillo PA-C   amLODIPine (NORVASC) 10 MG tablet Take 10 mg by mouth daily    Historical Provider, MD   tranexamic acid (LYSTEDA) 650 MG TABS tablet Take 2 tablets by mouth 3 times daily for 5 days 21  Rob Bowen MD   hydrOXYzine (VISTARIL) 25 MG capsule Take 1 capsule by mouth 4 times daily as needed for Anxiety  Patient not taking: Reported on 2023   Matt Mendieta MD   albuterol sulfate HFA (PROVENTIL HFA) 108 (90 Base) MCG/ACT inhaler Inhale 2 puffs into the lungs every 4 hours as needed for Wheezing or Shortness of Breath

## 2023-07-18 NOTE — PROGRESS NOTES
23    Savannah Harrison  1995    Chief Complaint   Patient presents with    Pre-op Exam     Laparoscopy, hysteroscopy d&c d/t chronic female pelvic pain, menometrorrhagia, and dysmenorrhea on 2023, consent signed. Pt states she has not had bp medication today d/t just getting back from vacation and it being packed. Kisha Taylor is a 33 y.o. female who presents today for evaluation of severe life altering pain and AUB.    Also has malodorous vaginal discharge c/w BV    Past Medical History:   Diagnosis Date    ADHD     Anxiety     Anxiety disorder     Asthma     Depression     Dyspareunia, female     Dysuria     Endometriosis     Frequent urination     Heart abnormality     heart murmur    Herpes simplex virus (HSV) infection     History of irregular menstrual bleeding     Hx of chlamydia infection     IBS (irritable bowel syndrome)     Kidney stones     Menometrorrhagia     Obesity     Pelvic pain     Preeclampsia        Past Surgical History:   Procedure Laterality Date     SECTION       SECTION N/A 2020     SECTION performed by Vern Lerner MD at [facility] L[facility]D OR    CYSTOSCOPY Right 2021    CYSTOSCOPY, RIGHT URETEROSCOPY performed by Luis Beebe MD at [facility] History     Tobacco Use    Smoking status: Every Day     Packs/day: 1.00     Years: 10.00     Pack years: 10.00     Types: Cigarettes     Start date:     Smokeless tobacco: Never   Vaping Use    Vaping Use: Every day    Substances: Nicotine, Flavoring    Devices: Disposable   Substance Use Topics    Alcohol use: Yes     Comment: occ    Drug use: No       Family History   Problem Relation Age of Onset    Hypertension Mother     Diabetes Father     Hypertension Father     Cancer Other        Current Outpatient Medications   Medication Sig Dispense Refill    clindamycin (CLEOCIN) 2 % vaginal cream Place 1 applicator vaginally nightly for 5 days Place

## 2023-07-19 ENCOUNTER — HOSPITAL ENCOUNTER (OUTPATIENT)
Age: 28
Setting detail: OUTPATIENT SURGERY
Discharge: HOME | End: 2023-07-19
Attending: OBSTETRICS & GYNECOLOGY | Admitting: OBSTETRICS & GYNECOLOGY
Payer: COMMERCIAL

## 2023-07-19 ENCOUNTER — ANESTHESIA (OUTPATIENT)
Dept: OPERATING ROOM | Age: 28
End: 2023-07-19
Payer: COMMERCIAL

## 2023-07-19 VITALS
RESPIRATION RATE: 16 BRPM | SYSTOLIC BLOOD PRESSURE: 131 MMHG | HEART RATE: 79 BPM | TEMPERATURE: 97.6 F | OXYGEN SATURATION: 97 % | DIASTOLIC BLOOD PRESSURE: 79 MMHG | BODY MASS INDEX: 37.21 KG/M2 | WEIGHT: 210 LBS | HEIGHT: 63 IN

## 2023-07-19 DIAGNOSIS — N94.6 DYSMENORRHEA: ICD-10-CM

## 2023-07-19 DIAGNOSIS — R10.2 CHRONIC FEMALE PELVIC PAIN: ICD-10-CM

## 2023-07-19 DIAGNOSIS — N92.1 MENOMETRORRHAGIA: ICD-10-CM

## 2023-07-19 DIAGNOSIS — G89.18 POST-OP PAIN: Primary | ICD-10-CM

## 2023-07-19 DIAGNOSIS — G89.29 CHRONIC FEMALE PELVIC PAIN: ICD-10-CM

## 2023-07-19 LAB
BASOPHILS ABSOLUTE: 0.1 K/CU MM
BASOPHILS RELATIVE PERCENT: 1.1 % (ref 0–1)
DIFFERENTIAL TYPE: ABNORMAL
EOSINOPHILS ABSOLUTE: 0.1 K/CU MM
EOSINOPHILS RELATIVE PERCENT: 2.4 % (ref 0–3)
HCT VFR BLD CALC: 39 % (ref 37–47)
HEMOGLOBIN: 12.7 GM/DL (ref 12.5–16)
IMMATURE NEUTROPHIL %: 0.2 % (ref 0–0.43)
LYMPHOCYTES ABSOLUTE: 2.8 K/CU MM
LYMPHOCYTES RELATIVE PERCENT: 50.4 % (ref 24–44)
MCH RBC QN AUTO: 27.1 PG (ref 27–31)
MCHC RBC AUTO-ENTMCNC: 32.6 % (ref 32–36)
MCV RBC AUTO: 83.3 FL (ref 78–100)
MONOCYTES ABSOLUTE: 0.4 K/CU MM
MONOCYTES RELATIVE PERCENT: 7.9 % (ref 0–4)
NUCLEATED RBC %: 0 %
PDW BLD-RTO: 14.4 % (ref 11.7–14.9)
PLATELET # BLD: 254 K/CU MM (ref 140–440)
PMV BLD AUTO: 8.9 FL (ref 7.5–11.1)
PREGNANCY TEST URINE, POC: NEGATIVE
RBC # BLD: 4.68 M/CU MM (ref 4.2–5.4)
SEGMENTED NEUTROPHILS ABSOLUTE COUNT: 2.1 K/CU MM
SEGMENTED NEUTROPHILS RELATIVE PERCENT: 38 % (ref 36–66)
TOTAL IMMATURE NEUTOROPHIL: 0.01 K/CU MM
TOTAL NUCLEATED RBC: 0 K/CU MM
WBC # BLD: 5.5 K/CU MM (ref 4–10.5)

## 2023-07-19 PROCEDURE — 58660 LAPAROSCOPY LYSIS: CPT | Performed by: OBSTETRICS & GYNECOLOGY

## 2023-07-19 PROCEDURE — 7100000001 HC PACU RECOVERY - ADDTL 15 MIN: Performed by: OBSTETRICS & GYNECOLOGY

## 2023-07-19 PROCEDURE — 6360000002 HC RX W HCPCS: Performed by: NURSE ANESTHETIST, CERTIFIED REGISTERED

## 2023-07-19 PROCEDURE — 2500000003 HC RX 250 WO HCPCS: Performed by: NURSE ANESTHETIST, CERTIFIED REGISTERED

## 2023-07-19 PROCEDURE — 2500000003 HC RX 250 WO HCPCS: Performed by: OBSTETRICS & GYNECOLOGY

## 2023-07-19 PROCEDURE — 3700000001 HC ADD 15 MINUTES (ANESTHESIA): Performed by: OBSTETRICS & GYNECOLOGY

## 2023-07-19 PROCEDURE — 6370000000 HC RX 637 (ALT 250 FOR IP)

## 2023-07-19 PROCEDURE — 6360000002 HC RX W HCPCS: Performed by: ANESTHESIOLOGY

## 2023-07-19 PROCEDURE — 88305 TISSUE EXAM BY PATHOLOGIST: CPT | Performed by: PATHOLOGY

## 2023-07-19 PROCEDURE — 2580000003 HC RX 258: Performed by: ANESTHESIOLOGY

## 2023-07-19 PROCEDURE — 2720000010 HC SURG SUPPLY STERILE: Performed by: OBSTETRICS & GYNECOLOGY

## 2023-07-19 PROCEDURE — 85025 COMPLETE CBC W/AUTO DIFF WBC: CPT

## 2023-07-19 PROCEDURE — 7100000000 HC PACU RECOVERY - FIRST 15 MIN: Performed by: OBSTETRICS & GYNECOLOGY

## 2023-07-19 PROCEDURE — C9399 UNCLASSIFIED DRUGS OR BIOLOG: HCPCS | Performed by: NURSE ANESTHETIST, CERTIFIED REGISTERED

## 2023-07-19 PROCEDURE — 2709999900 HC NON-CHARGEABLE SUPPLY: Performed by: OBSTETRICS & GYNECOLOGY

## 2023-07-19 PROCEDURE — 7100000011 HC PHASE II RECOVERY - ADDTL 15 MIN: Performed by: OBSTETRICS & GYNECOLOGY

## 2023-07-19 PROCEDURE — 7100000010 HC PHASE II RECOVERY - FIRST 15 MIN: Performed by: OBSTETRICS & GYNECOLOGY

## 2023-07-19 PROCEDURE — 58558 HYSTEROSCOPY BIOPSY: CPT | Performed by: OBSTETRICS & GYNECOLOGY

## 2023-07-19 PROCEDURE — 3600000014 HC SURGERY LEVEL 4 ADDTL 15MIN: Performed by: OBSTETRICS & GYNECOLOGY

## 2023-07-19 PROCEDURE — 81025 URINE PREGNANCY TEST: CPT

## 2023-07-19 PROCEDURE — 3600000004 HC SURGERY LEVEL 4 BASE: Performed by: OBSTETRICS & GYNECOLOGY

## 2023-07-19 PROCEDURE — 6370000000 HC RX 637 (ALT 250 FOR IP): Performed by: NURSE ANESTHETIST, CERTIFIED REGISTERED

## 2023-07-19 PROCEDURE — 6370000000 HC RX 637 (ALT 250 FOR IP): Performed by: ANESTHESIOLOGY

## 2023-07-19 PROCEDURE — 3700000000 HC ANESTHESIA ATTENDED CARE: Performed by: OBSTETRICS & GYNECOLOGY

## 2023-07-19 PROCEDURE — 6370000000 HC RX 637 (ALT 250 FOR IP): Performed by: OBSTETRICS & GYNECOLOGY

## 2023-07-19 RX ORDER — HYDRALAZINE HYDROCHLORIDE 20 MG/ML
10 INJECTION INTRAMUSCULAR; INTRAVENOUS
Status: DISCONTINUED | OUTPATIENT
Start: 2023-07-19 | End: 2023-07-19 | Stop reason: HOSPADM

## 2023-07-19 RX ORDER — LABETALOL HYDROCHLORIDE 5 MG/ML
10 INJECTION, SOLUTION INTRAVENOUS
Status: DISCONTINUED | OUTPATIENT
Start: 2023-07-19 | End: 2023-07-19 | Stop reason: HOSPADM

## 2023-07-19 RX ORDER — IBUPROFEN 800 MG/1
800 TABLET ORAL EVERY 8 HOURS PRN
Qty: 60 TABLET | Refills: 2 | Status: SHIPPED | OUTPATIENT
Start: 2023-07-19 | End: 2023-07-19 | Stop reason: HOSPADM

## 2023-07-19 RX ORDER — SODIUM CHLORIDE, SODIUM LACTATE, POTASSIUM CHLORIDE, CALCIUM CHLORIDE 600; 310; 30; 20 MG/100ML; MG/100ML; MG/100ML; MG/100ML
INJECTION, SOLUTION INTRAVENOUS CONTINUOUS
Status: DISCONTINUED | OUTPATIENT
Start: 2023-07-19 | End: 2023-07-19 | Stop reason: HOSPADM

## 2023-07-19 RX ORDER — MIDAZOLAM HYDROCHLORIDE 1 MG/ML
INJECTION INTRAMUSCULAR; INTRAVENOUS PRN
Status: DISCONTINUED | OUTPATIENT
Start: 2023-07-19 | End: 2023-07-19 | Stop reason: SDUPTHER

## 2023-07-19 RX ORDER — HYDROCODONE BITARTRATE AND ACETAMINOPHEN 5; 325 MG/1; MG/1
1 TABLET ORAL EVERY 6 HOURS PRN
Qty: 20 TABLET | Refills: 0 | Status: SHIPPED | OUTPATIENT
Start: 2023-07-19 | End: 2023-07-19 | Stop reason: SDUPTHER

## 2023-07-19 RX ORDER — SCOLOPAMINE TRANSDERMAL SYSTEM 1 MG/1
1 PATCH, EXTENDED RELEASE TRANSDERMAL
Status: DISCONTINUED | OUTPATIENT
Start: 2023-07-19 | End: 2023-07-19 | Stop reason: HOSPADM

## 2023-07-19 RX ORDER — CELECOXIB 200 MG/1
200 CAPSULE ORAL ONCE
Status: COMPLETED | OUTPATIENT
Start: 2023-07-19 | End: 2023-07-19

## 2023-07-19 RX ORDER — LIDOCAINE HYDROCHLORIDE 20 MG/ML
INJECTION, SOLUTION EPIDURAL; INFILTRATION; INTRACAUDAL; PERINEURAL PRN
Status: DISCONTINUED | OUTPATIENT
Start: 2023-07-19 | End: 2023-07-19 | Stop reason: SDUPTHER

## 2023-07-19 RX ORDER — SODIUM CHLORIDE 0.9 % (FLUSH) 0.9 %
5-40 SYRINGE (ML) INJECTION PRN
Status: DISCONTINUED | OUTPATIENT
Start: 2023-07-19 | End: 2023-07-19 | Stop reason: HOSPADM

## 2023-07-19 RX ORDER — FENTANYL CITRATE 50 UG/ML
50 INJECTION, SOLUTION INTRAMUSCULAR; INTRAVENOUS EVERY 5 MIN PRN
Status: DISCONTINUED | OUTPATIENT
Start: 2023-07-19 | End: 2023-07-19 | Stop reason: HOSPADM

## 2023-07-19 RX ORDER — DROPERIDOL 2.5 MG/ML
0.62 INJECTION, SOLUTION INTRAMUSCULAR; INTRAVENOUS
Status: DISCONTINUED | OUTPATIENT
Start: 2023-07-19 | End: 2023-07-19 | Stop reason: HOSPADM

## 2023-07-19 RX ORDER — IPRATROPIUM BROMIDE AND ALBUTEROL SULFATE 2.5; .5 MG/3ML; MG/3ML
1 SOLUTION RESPIRATORY (INHALATION) ONCE
Status: COMPLETED | OUTPATIENT
Start: 2023-07-19 | End: 2023-07-19

## 2023-07-19 RX ORDER — SODIUM CHLORIDE 0.9 % (FLUSH) 0.9 %
5-40 SYRINGE (ML) INJECTION EVERY 12 HOURS SCHEDULED
Status: DISCONTINUED | OUTPATIENT
Start: 2023-07-19 | End: 2023-07-19 | Stop reason: HOSPADM

## 2023-07-19 RX ORDER — SODIUM CHLORIDE 9 MG/ML
INJECTION, SOLUTION INTRAVENOUS CONTINUOUS
Status: DISCONTINUED | OUTPATIENT
Start: 2023-07-19 | End: 2023-07-19 | Stop reason: HOSPADM

## 2023-07-19 RX ORDER — IBUPROFEN 800 MG/1
800 TABLET ORAL EVERY 8 HOURS PRN
Qty: 60 TABLET | Refills: 2 | Status: SHIPPED | OUTPATIENT
Start: 2023-07-19 | End: 2023-07-19 | Stop reason: SDUPTHER

## 2023-07-19 RX ORDER — ONDANSETRON 2 MG/ML
4 INJECTION INTRAMUSCULAR; INTRAVENOUS
Status: DISCONTINUED | OUTPATIENT
Start: 2023-07-19 | End: 2023-07-19 | Stop reason: HOSPADM

## 2023-07-19 RX ORDER — DEXAMETHASONE SODIUM PHOSPHATE 4 MG/ML
INJECTION, SOLUTION INTRA-ARTICULAR; INTRALESIONAL; INTRAMUSCULAR; INTRAVENOUS; SOFT TISSUE PRN
Status: DISCONTINUED | OUTPATIENT
Start: 2023-07-19 | End: 2023-07-19 | Stop reason: SDUPTHER

## 2023-07-19 RX ORDER — OXYCODONE HYDROCHLORIDE 5 MG/1
5 TABLET ORAL
Status: DISCONTINUED | OUTPATIENT
Start: 2023-07-19 | End: 2023-07-19 | Stop reason: HOSPADM

## 2023-07-19 RX ORDER — SODIUM CHLORIDE 9 MG/ML
INJECTION, SOLUTION INTRAVENOUS PRN
Status: DISCONTINUED | OUTPATIENT
Start: 2023-07-19 | End: 2023-07-19 | Stop reason: HOSPADM

## 2023-07-19 RX ORDER — BUPIVACAINE HYDROCHLORIDE AND EPINEPHRINE 5; 5 MG/ML; UG/ML
INJECTION, SOLUTION EPIDURAL; INTRACAUDAL; PERINEURAL
Status: COMPLETED | OUTPATIENT
Start: 2023-07-19 | End: 2023-07-19

## 2023-07-19 RX ORDER — ACETAMINOPHEN 500 MG
1000 TABLET ORAL ONCE
Status: COMPLETED | OUTPATIENT
Start: 2023-07-19 | End: 2023-07-19

## 2023-07-19 RX ORDER — PROPOFOL 10 MG/ML
INJECTION, EMULSION INTRAVENOUS PRN
Status: DISCONTINUED | OUTPATIENT
Start: 2023-07-19 | End: 2023-07-19 | Stop reason: SDUPTHER

## 2023-07-19 RX ORDER — IPRATROPIUM BROMIDE AND ALBUTEROL SULFATE 2.5; .5 MG/3ML; MG/3ML
SOLUTION RESPIRATORY (INHALATION)
Status: COMPLETED
Start: 2023-07-19 | End: 2023-07-19

## 2023-07-19 RX ORDER — ONDANSETRON 2 MG/ML
INJECTION INTRAMUSCULAR; INTRAVENOUS PRN
Status: DISCONTINUED | OUTPATIENT
Start: 2023-07-19 | End: 2023-07-19 | Stop reason: SDUPTHER

## 2023-07-19 RX ORDER — IBUPROFEN 800 MG/1
800 TABLET ORAL EVERY 8 HOURS PRN
Qty: 60 TABLET | Refills: 2 | Status: SHIPPED | OUTPATIENT
Start: 2023-07-19

## 2023-07-19 RX ORDER — LIDOCAINE HYDROCHLORIDE 40 MG/ML
SOLUTION TOPICAL PRN
Status: DISCONTINUED | OUTPATIENT
Start: 2023-07-19 | End: 2023-07-19 | Stop reason: SDUPTHER

## 2023-07-19 RX ORDER — HYDROCODONE BITARTRATE AND ACETAMINOPHEN 5; 325 MG/1; MG/1
1 TABLET ORAL EVERY 6 HOURS PRN
Qty: 20 TABLET | Refills: 0 | Status: SHIPPED | OUTPATIENT
Start: 2023-07-19 | End: 2023-07-24

## 2023-07-19 RX ORDER — FENTANYL CITRATE 50 UG/ML
INJECTION, SOLUTION INTRAMUSCULAR; INTRAVENOUS PRN
Status: DISCONTINUED | OUTPATIENT
Start: 2023-07-19 | End: 2023-07-19 | Stop reason: SDUPTHER

## 2023-07-19 RX ORDER — MEPERIDINE HYDROCHLORIDE 25 MG/ML
12.5 INJECTION INTRAMUSCULAR; INTRAVENOUS; SUBCUTANEOUS EVERY 5 MIN PRN
Status: DISCONTINUED | OUTPATIENT
Start: 2023-07-19 | End: 2023-07-19 | Stop reason: HOSPADM

## 2023-07-19 RX ORDER — ROCURONIUM BROMIDE 10 MG/ML
INJECTION, SOLUTION INTRAVENOUS PRN
Status: DISCONTINUED | OUTPATIENT
Start: 2023-07-19 | End: 2023-07-19 | Stop reason: SDUPTHER

## 2023-07-19 RX ADMIN — IPRATROPIUM BROMIDE AND ALBUTEROL SULFATE 1 DOSE: .5; 2.5 SOLUTION RESPIRATORY (INHALATION) at 15:56

## 2023-07-19 RX ADMIN — SODIUM CHLORIDE, POTASSIUM CHLORIDE, SODIUM LACTATE AND CALCIUM CHLORIDE: 600; 310; 30; 20 INJECTION, SOLUTION INTRAVENOUS at 12:48

## 2023-07-19 RX ADMIN — SUGAMMADEX 200 MG: 100 INJECTION, SOLUTION INTRAVENOUS at 15:13

## 2023-07-19 RX ADMIN — ACETAMINOPHEN 1000 MG: 500 TABLET ORAL at 12:49

## 2023-07-19 RX ADMIN — DEXAMETHASONE SODIUM PHOSPHATE 8 MG: 4 INJECTION, SOLUTION INTRAMUSCULAR; INTRAVENOUS at 14:36

## 2023-07-19 RX ADMIN — LIDOCAINE HYDROCHLORIDE 80 MG: 20 INJECTION, SOLUTION EPIDURAL; INFILTRATION; INTRACAUDAL; PERINEURAL at 14:30

## 2023-07-19 RX ADMIN — PROPOFOL 200 MG: 10 INJECTION, EMULSION INTRAVENOUS at 14:30

## 2023-07-19 RX ADMIN — LIDOCAINE HYDROCHLORIDE 3 ML: 40 SOLUTION TOPICAL at 14:32

## 2023-07-19 RX ADMIN — IPRATROPIUM BROMIDE AND ALBUTEROL SULFATE 1 DOSE: 2.5; .5 SOLUTION RESPIRATORY (INHALATION) at 15:56

## 2023-07-19 RX ADMIN — MIDAZOLAM 2 MG: 1 INJECTION INTRAMUSCULAR; INTRAVENOUS at 14:24

## 2023-07-19 RX ADMIN — ONDANSETRON 4 MG: 2 INJECTION INTRAMUSCULAR; INTRAVENOUS at 14:50

## 2023-07-19 RX ADMIN — CELECOXIB 200 MG: 200 CAPSULE ORAL at 12:49

## 2023-07-19 RX ADMIN — ROCURONIUM BROMIDE 50 MG: 10 INJECTION INTRAVENOUS at 14:31

## 2023-07-19 RX ADMIN — FENTANYL CITRATE 50 MCG: 50 INJECTION, SOLUTION INTRAMUSCULAR; INTRAVENOUS at 15:42

## 2023-07-19 RX ADMIN — FENTANYL CITRATE 100 MCG: 50 INJECTION, SOLUTION INTRAMUSCULAR; INTRAVENOUS at 14:30

## 2023-07-19 RX ADMIN — ONDANSETRON 4 MG: 2 INJECTION INTRAMUSCULAR; INTRAVENOUS at 14:24

## 2023-07-19 ASSESSMENT — PAIN DESCRIPTION - LOCATION
LOCATION: ABDOMEN

## 2023-07-19 ASSESSMENT — PAIN DESCRIPTION - ORIENTATION
ORIENTATION: LEFT
ORIENTATION: LOWER
ORIENTATION: LOWER

## 2023-07-19 ASSESSMENT — PAIN - FUNCTIONAL ASSESSMENT
PAIN_FUNCTIONAL_ASSESSMENT: 0-10
PAIN_FUNCTIONAL_ASSESSMENT: ACTIVITIES ARE NOT PREVENTED

## 2023-07-19 ASSESSMENT — PAIN SCALES - GENERAL
PAINLEVEL_OUTOF10: 2
PAINLEVEL_OUTOF10: 6
PAINLEVEL_OUTOF10: 2
PAINLEVEL_OUTOF10: 0
PAINLEVEL_OUTOF10: 0

## 2023-07-19 ASSESSMENT — PAIN DESCRIPTION - FREQUENCY: FREQUENCY: INTERMITTENT

## 2023-07-19 ASSESSMENT — PAIN DESCRIPTION - DESCRIPTORS
DESCRIPTORS: ACHING
DESCRIPTORS: ACHING

## 2023-07-19 ASSESSMENT — LIFESTYLE VARIABLES: SMOKING_STATUS: 1

## 2023-07-19 NOTE — H&P
Neto Pearson  1995  Primary Care Physician: Mary Gutierrez MD    [facility]: [facility]    HPI: Neto Pearson is a 33 y.o. female [id number] is menometrorrhagia, chronic female pelvic pain, dysmenorrhea. No diagnosis found.    Patient Active Problem List   Diagnosis    Thrombosed external hemorrhoid    Anal pain    Mild preeclampsia, third trimester    Kidney stone    Hypokalemia    Hypomagnesemia    Dichorionic diamniotic twin pregnancy in third trimester    Preeclampsia    Menometrorrhagia    Obesity    Frequent urination    Dysuria    Snoring    Hypersomnolence    Herpes simplex vulvovaginitis       OB History    Para Term  AB Living   4 3 2 1 1 4   SAB IAB Ectopic Molar Multiple Live Births   1 0 0 0 1 4      # Outcome Date GA Lbr Yoni/2nd Weight Sex Delivery Anes PTL Lv   4A  21 29w1d  2 lb 12 oz (1.247 kg) M CS-Classical   ANABELA   4B  21 29w0d  2 lb 12 oz (1.247 kg) F CS-Classical   ANABELA   3 SAB 10/03/20           2 Term 20 37w0d  6 lb 13.5 oz (3.105 kg) F CS-LTranv Spinal N ANABELA      Birth Comments: actual scale weight 6 lbs 13.6oz      Name: Shelton Guerrero: 9  Apgar5: 9   1 Term 01/30/15 11w1d  6 lb 13 oz (3.09 kg) F CS-LTranv   ANABELA     Past Medical History:   Diagnosis Date    ADHD     Anxiety     Anxiety disorder     Asthma     Depression 2015    Dyspareunia, female     Dysuria     Endometriosis     Frequent urination     Heart abnormality     heart murmur    Herpes simplex virus (HSV) infection     History of irregular menstrual bleeding     Hx of chlamydia infection     IBS (irritable bowel syndrome)     Kidney stones     Menometrorrhagia     Obesity     Pelvic pain     Preeclampsia        Past Surgical History:   Procedure Laterality Date     SECTION       SECTION N/A 2020     SECTION performed by Iglesia Faye MD at [facility]

## 2023-07-19 NOTE — ANESTHESIA PRE PROCEDURE
Department of Anesthesiology  Preprocedure Note       Name:  Heriberto Gonzales   Age:  33 y.o.  :  1995                                          MRN:  [id number]         Date:  2023      Surgeon: Trinidad Starks):  Pasquale Carrero MD    Procedure: Procedure(s):  LAPAROSCOPY EXPLORATORY  DILATATION AND CURETTAGE HYSTEROSCOPY    Medications prior to admission:   Prior to Admission medications    Medication Sig Start Date End Date Taking? Authorizing Provider   metoprolol succinate (TOPROL XL) 25 MG extended release tablet Take 1 tablet by mouth daily    Historical Provider, MD   DULoxetine (CYMBALTA) 30 MG extended release capsule Take by mouth daily 23   Historical Provider, MD   vitamin D (ERGOCALCIFEROL) 1.25 MG (21081 UT) CAPS capsule PLEASE SEE ATTACHED FOR DETAILED DIRECTIONS 23   Historical Provider, MD   folic acid (FOLVITE) 576 MCG tablet Take 1 tablet by mouth daily 7/10/23   Historical Provider, MD   LORazepam (ATIVAN) 0.5 MG tablet Take 1 tablet by mouth 2 times daily as needed. 7/3/23   Historical Provider, MD   clindamycin (CLEOCIN) 2 % vaginal cream Place 1 applicator vaginally nightly for 5 days Place vaginally nightly.   Patient not taking: Reported on 2023  Pasquale Carrero MD   norethindrone (JONY) 0.35 MG tablet Take 1 tablet by mouth daily  Patient not taking: Reported on 2023   Pasquale Carrero MD   valACYclovir (VALTREX) 1 g tablet Take 1 tablet by mouth daily 3/29/23   Amada Fernandez PA-C   valACYclovir (VALTREX) 500 MG tablet Take 1 tablet by mouth 2 times daily 3/28/23   Amada Fernandez PA-C   amLODIPine (NORVASC) 10 MG tablet Take 1 tablet by mouth daily    Historical Provider, MD   tranexamic acid (LYSTEDA) 650 MG TABS tablet Take 2 tablets by mouth 3 times daily for 5 days 21  Pasquale Carrero MD   hydrOXYzine (VISTARIL) 25 MG capsule Take 1 capsule by mouth 4 times daily as needed for

## 2023-07-19 NOTE — PROGRESS NOTES
1650:  Discharge instructions discussed with patient, verbalized an understanding. 1705:  Pt discharged to home alert and oriented with 2/10 c/o left flank discomfort. Abd incision x2 covered with bandades, no bleeding noted. Minimal vaginal bleeding present on jerry pad. Pt tolerating PO, VSS.

## 2023-07-19 NOTE — OP NOTE
Department of Gynecology  Operative Report        PREOPERATIVE DIAGNOSIS:              Menometrorrhagia, chronic female pelvic pain, dysmenorrhea    POSTOPERATIVE DIAGNOSIS:            Same, pelvic adhesions    OPERATION:                                         Laparoscopy with adhesiolysis, hysteroscopy and D&C. Surgeon: Dr. Zenia Gerardo     Assistant(s): None    Anesthesia:  General Endotracheal Anesthesia    Findings: Omentum adhered to the intra-abdominal wall. Normal uterus. Normal ovaries bilaterally. No evidence of endometriosis. Bilaterally the tubes were clogged with no visible fimbriated end. Also of note there were Tubes but there was no true hydrosalpinx visualized. There were no anterior posterior cul-de-sac adhesions    Estimated blood loss: 1 cc    Blood Transfusion?:  None     Drains: None    Specimens: Endometrial curettings    Complications: None    Condition: Stable    Narrative operative report: After informed consent was obtained, patient was brought to the operating suite where general endotracheal anesthesia was attained by the anesthesia service without complication. Patient was then prepped and draped in the normal sterile fashion after she was placed in the dorsal lithotomy position. Bladder was drained via transurethral in and out catheterization. Tenaculum was placed inside the cervix attached into the cervix. Gloves were changed and attention was then turned to the abdomen. All incisions were infiltrated with 0.5% Marcaine with epinephrine prior to incision being made. A 5 mm vertical umbilical skin incision was made. A 5 mm trocar and sleeve were introduced into the abdomen under direct visualization. Pneumoperitoneum was obtained. A second skin incision was made in the left lower quadrant this was a 5 mm incision and a 5 mm trocar and sleeve were introduced into the abdomen under direct visualization. A diagnostic laparoscopy was performed as noted above. Using multiple device, the omentum was removed from the intra-abdominal wall. The omentum was grasped at the insertion point to the intra-abdominal wall to point to the LigaSure device, sealed, and cut. The entire omentum was adhered to the intra-abdominal wall. A systematic look at all surgical sites were performed and they were noted to be hemostatic. Left lower quadrant trocar was removed under direct visualization and no bleeding was noted. Carbon dioxide was released from abdominal cavity and the midline trocar was removed from the abdominal cavity. Skin was closed with 4-0 Monocryl in a subcuticular fashion and a bandage was applied by the certified first assistant Raina Abdi. .       A speculum was placed in the vagina. The anterior lip of the cervix was grabbed by single-tooth tenaculum. The cervical os was dilated with Rakan dilators. A diagnostic hysteroscope was introduced into the uterine cavity, and using normal saline as distending medium, diagnostic hysteroscopy was carried out. The hysteroscope was then withdrawn. Next, sharp curettage of the endometrium was performed, and all tissues were submitted to pathology. The single-tooth tenaculum was removed from the patient's cervix. Inspection revealed excellent hemostasis. The procedure was then terminated. All instruments were removed. Patient was placed in the supine position and awoken from anesthesia and extubated in the operating room by the anesthesia service without complication. Patient was brought to recovery room in stable condition.   There were no complications      Pasquale Carrero MD 7/19/2023 3:16 PM

## 2023-07-19 NOTE — PROGRESS NOTES
1521 Pt. Came to PACU, monitors attached to patient and bedside report from Jessica, [address] and ELAINE Pan. Pt. Remains on 6L simple mask and cold. Warm blankets applied to patient. Surgical site dressings are C/D/I.   1530 Anesthesia called for PACU orders. 1542 Pt. Medicated per order for pain. 1545 Pt. States she is SOB and \"chest feels tight\". Anesthetia at bedside. 28[facility] order for Duo-neb placed. 1559 Breathing treatment given. 1600 Pt. Resting comfortably at this time. Pt. Mother, Jay Mansfield updated on POC per patients request.  1612 Pt. Comfortable and denies current needs at this time. 1619 Pt. Transferred to [facility] report to Ernesto Freeman RN.

## 2023-07-20 ENCOUNTER — OFFICE VISIT (OUTPATIENT)
Dept: OBGYN | Age: 28
End: 2023-07-20

## 2023-07-20 VITALS
SYSTOLIC BLOOD PRESSURE: 135 MMHG | BODY MASS INDEX: 37.21 KG/M2 | TEMPERATURE: 98.2 F | HEIGHT: 63 IN | DIASTOLIC BLOOD PRESSURE: 86 MMHG | WEIGHT: 210 LBS

## 2023-07-20 DIAGNOSIS — Z09 POSTOPERATIVE EXAMINATION: Primary | ICD-10-CM

## 2023-07-20 PROCEDURE — 99024 POSTOP FOLLOW-UP VISIT: CPT | Performed by: OBSTETRICS & GYNECOLOGY

## 2023-07-20 NOTE — ANESTHESIA POSTPROCEDURE EVALUATION
Department of Anesthesiology  Postprocedure Note    Patient: Eyal Lobo  MRN: [id number]  YOB: 1995  Date of evaluation: 7/20/2023      Procedure Summary     Date: 07/19/23 Room / Location: [address] [place] [address]    Anesthesia Start: 1424 Anesthesia Stop: 1526    Procedures:       LAPAROSCOPY EXPLORATORY (Abdomen)      DILATATION AND CURETTAGE HYSTEROSCOPY (Vagina ) Diagnosis:       Chronic female pelvic pain      Menometrorrhagia      Dysmenorrhea      (Chronic female pelvic pain [R10.2, G89.29])      (Menometrorrhagia [N92.1])      (Dysmenorrhea [N94.6])    Surgeons: Essence Silvestre MD Responsible Provider: Katie Mata MD    Anesthesia Type: General ASA Status: 2          Anesthesia Type: General    Rajeev Phase I: Rajeev Score: 10    Rajeev Phase II: Rajeev Score: 10      Anesthesia Post Evaluation    Patient location during evaluation: PACU  Patient participation: complete - patient participated  Level of consciousness: awake and alert  Airway patency: patent  Nausea & Vomiting: no nausea and no vomiting  Complications: no  Cardiovascular status: hemodynamically stable  Respiratory status: acceptable  Hydration status: euvolemic

## 2023-07-20 NOTE — PROGRESS NOTES
Ummbilical incision 5mm incision suture is gone. Cleaned  Steristrips applied  Bandaid applied.    Wound care discussed

## 2023-07-24 ENCOUNTER — HOSPITAL ENCOUNTER (OUTPATIENT)
Dept: PHYSICAL THERAPY | Age: 28
Setting detail: THERAPIES SERIES
Discharge: HOME | End: 2023-07-24

## 2023-07-24 NOTE — FLOWSHEET NOTE
Physical Therapy  Cancellation/No-show Note  Patient Name:  Brie Ramirez  :  1995   Date:  2023  Cancelled visits to date: 2  No-shows to date: 2    For today's appointment patient:  []  Cancelled  []  Rescheduled appointment  [x]  No-show     Reason given by patient:  []  Patient ill  []  Conflicting appointment  []  No transportation    []  Conflict with work  [x]  No reason given  []  Other:     Comments:   pt has not been seen in 8 weeks, last seen 23.     Electronically signed by:  Alejandra Rasmussen, PT,

## 2023-07-24 NOTE — DISCHARGE SUMMARY
Outpatient Physical Therapy           [place]           [x] Phone: [phone]   Fax: [phone]  [place]           [] Phone: [phone]   Fax: [phone]      To: Cody Carrera MD     From: Otoniel Iverson, PT, DPT     Patient: Tacos Barth                    : 1995  Diagnosis:  Sprain of unspecified site of right knee, subsequent encounter [S83.91XD]        Treatment Diagnosis:      R knee pain, PFPS, patellar hyper mobility, knee hyper ext  Date: 2023  []  Progress Note                  Discharge Note    Evaluation Date:     Total Visits to date:   2 Cancels/No-shows to date:  4    Subjective:   no show no call for today's visit, she has not been seen int the clinci since 23.       Plan of Care/Treatment to date:  [x] Therapeutic Exercise    [] Modalities:  [] Therapeutic Activity     [] Ultrasound  [] Electrical Stimulation  [x] Gait Training      [] Cervical Traction   [] Lumbar Traction  [x] Neuromuscular Re-education  [] Cold/hotpack [] Iontophoresis  [] Instruction in HEP      Other:  [x] Manual Therapy       []  Vasopneumatic  [] Aquatic Therapy       []   Dry Needle Therapy                      Objective/Significant Findings At Last Visit/Comments:   pt was not compliant with therapy      Assessment:    no seen at d/c       Goal Status:  [] Achieved [] Partially Achieved  [x] Not Achieved, unable to reassess at this time     Short term goals  Time Frame for Short term goals: 4 weeks  1. ind wht HEP for patellar control and LE strength     not met  2. avg pain 2/10 with playing with kids, stairs, and sit to stand          not met  3. reports 50% less tenderness in the patellar area R knee        not met  4. pt to beind with patellar control brace as needed             not met  Long Term Goals  Time Frame for Long Term Goals: 6-8 eeks  1. ind with gym workout                not met  2. squat to 90/90 wiith good form and  1/10 or less R knee pain             not

## 2023-08-10 PROCEDURE — 99285 EMERGENCY DEPT VISIT HI MDM: CPT

## 2023-08-11 ENCOUNTER — HOSPITAL ENCOUNTER (EMERGENCY)
Age: 28
Discharge: HOME | End: 2023-08-11
Attending: EMERGENCY MEDICINE
Payer: COMMERCIAL

## 2023-08-11 ENCOUNTER — APPOINTMENT (OUTPATIENT)
Dept: GENERAL RADIOLOGY | Age: 28
End: 2023-08-11
Payer: COMMERCIAL

## 2023-08-11 VITALS
HEIGHT: 63 IN | OXYGEN SATURATION: 98 % | RESPIRATION RATE: 18 BRPM | HEART RATE: 86 BPM | SYSTOLIC BLOOD PRESSURE: 148 MMHG | WEIGHT: 205 LBS | BODY MASS INDEX: 36.32 KG/M2 | DIASTOLIC BLOOD PRESSURE: 99 MMHG | TEMPERATURE: 98.6 F

## 2023-08-11 DIAGNOSIS — I10 UNCONTROLLED HYPERTENSION: ICD-10-CM

## 2023-08-11 DIAGNOSIS — H65.91 FLUID LEVEL BEHIND TYMPANIC MEMBRANE OF RIGHT EAR: ICD-10-CM

## 2023-08-11 DIAGNOSIS — R42 LIGHTHEADEDNESS: Primary | ICD-10-CM

## 2023-08-11 LAB
ALBUMIN SERPL-MCNC: 4.4 GM/DL (ref 3.4–5)
ALP BLD-CCNC: 78 IU/L (ref 40–129)
ALT SERPL-CCNC: 10 U/L (ref 10–40)
ANION GAP SERPL CALCULATED.3IONS-SCNC: 11 MMOL/L (ref 4–16)
AST SERPL-CCNC: 11 IU/L (ref 15–37)
BASOPHILS ABSOLUTE: 0.1 K/CU MM
BASOPHILS RELATIVE PERCENT: 1 % (ref 0–1)
BILIRUB SERPL-MCNC: 0.1 MG/DL (ref 0–1)
BILIRUBIN DIRECT: 0.2 MG/DL (ref 0–0.3)
BILIRUBIN, INDIRECT: ABNORMAL MG/DL (ref 0–0.7)
BUN SERPL-MCNC: 12 MG/DL (ref 6–23)
CALCIUM SERPL-MCNC: 9.7 MG/DL (ref 8.3–10.6)
CHLORIDE BLD-SCNC: 103 MMOL/L (ref 99–110)
CO2: 25 MMOL/L (ref 21–32)
CREAT SERPL-MCNC: 0.7 MG/DL (ref 0.6–1.1)
DIFFERENTIAL TYPE: ABNORMAL
EKG ATRIAL RATE: 81 BPM
EKG DIAGNOSIS: NORMAL
EKG P AXIS: 62 DEGREES
EKG P-R INTERVAL: 154 MS
EKG Q-T INTERVAL: 376 MS
EKG QRS DURATION: 84 MS
EKG QTC CALCULATION (BAZETT): 436 MS
EKG R AXIS: 7 DEGREES
EKG T AXIS: 40 DEGREES
EKG VENTRICULAR RATE: 81 BPM
EOSINOPHILS ABSOLUTE: 0.2 K/CU MM
EOSINOPHILS RELATIVE PERCENT: 1.8 % (ref 0–3)
GFR SERPL CREATININE-BSD FRML MDRD: >60 ML/MIN/1.73M2
GLUCOSE SERPL-MCNC: 96 MG/DL (ref 70–99)
HCG QUALITATIVE: NEGATIVE
HCT VFR BLD CALC: 39.8 % (ref 37–47)
HEMOGLOBIN: 12.6 GM/DL (ref 12.5–16)
IMMATURE NEUTROPHIL %: 0.4 % (ref 0–0.43)
LIPASE: 35 IU/L (ref 13–60)
LYMPHOCYTES ABSOLUTE: 3.4 K/CU MM
LYMPHOCYTES RELATIVE PERCENT: 40 % (ref 24–44)
MAGNESIUM: 2 MG/DL (ref 1.8–2.4)
MCH RBC QN AUTO: 26.4 PG (ref 27–31)
MCHC RBC AUTO-ENTMCNC: 31.7 % (ref 32–36)
MCV RBC AUTO: 83.3 FL (ref 78–100)
MONOCYTES ABSOLUTE: 0.4 K/CU MM
MONOCYTES RELATIVE PERCENT: 5.1 % (ref 0–4)
NUCLEATED RBC %: 0 %
PDW BLD-RTO: 14.4 % (ref 11.7–14.9)
PLATELET # BLD: 399 K/CU MM (ref 140–440)
PMV BLD AUTO: 8.7 FL (ref 7.5–11.1)
POTASSIUM SERPL-SCNC: 3.6 MMOL/L (ref 3.5–5.1)
RBC # BLD: 4.78 M/CU MM (ref 4.2–5.4)
SEGMENTED NEUTROPHILS ABSOLUTE COUNT: 4.3 K/CU MM
SEGMENTED NEUTROPHILS RELATIVE PERCENT: 51.7 % (ref 36–66)
SODIUM BLD-SCNC: 139 MMOL/L (ref 135–145)
TOTAL IMMATURE NEUTOROPHIL: 0.03 K/CU MM
TOTAL NUCLEATED RBC: 0 K/CU MM
TOTAL PROTEIN: 7.5 GM/DL (ref 6.4–8.2)
TROPONIN T: <0.01 NG/ML
WBC # BLD: 8.4 K/CU MM (ref 4–10.5)

## 2023-08-11 PROCEDURE — 83735 ASSAY OF MAGNESIUM: CPT

## 2023-08-11 PROCEDURE — 80048 BASIC METABOLIC PNL TOTAL CA: CPT

## 2023-08-11 PROCEDURE — 84484 ASSAY OF TROPONIN QUANT: CPT

## 2023-08-11 PROCEDURE — 71045 X-RAY EXAM CHEST 1 VIEW: CPT

## 2023-08-11 PROCEDURE — 84703 CHORIONIC GONADOTROPIN ASSAY: CPT

## 2023-08-11 PROCEDURE — 83690 ASSAY OF LIPASE: CPT

## 2023-08-11 PROCEDURE — 6370000000 HC RX 637 (ALT 250 FOR IP): Performed by: EMERGENCY MEDICINE

## 2023-08-11 PROCEDURE — 80076 HEPATIC FUNCTION PANEL: CPT

## 2023-08-11 PROCEDURE — 93010 ELECTROCARDIOGRAM REPORT: CPT | Performed by: INTERNAL MEDICINE

## 2023-08-11 PROCEDURE — 93005 ELECTROCARDIOGRAM TRACING: CPT | Performed by: EMERGENCY MEDICINE

## 2023-08-11 PROCEDURE — 85025 COMPLETE CBC W/AUTO DIFF WBC: CPT

## 2023-08-11 RX ORDER — MECLIZINE HYDROCHLORIDE 25 MG/1
25 TABLET ORAL 3 TIMES DAILY PRN
Qty: 15 TABLET | Refills: 0 | Status: SHIPPED | OUTPATIENT
Start: 2023-08-11 | End: 2023-08-21

## 2023-08-11 RX ORDER — MECLIZINE HYDROCHLORIDE 25 MG/1
25 TABLET ORAL ONCE
Status: COMPLETED | OUTPATIENT
Start: 2023-08-11 | End: 2023-08-11

## 2023-08-11 RX ADMIN — MECLIZINE HYDROCHLORIDE 25 MG: 25 TABLET ORAL at 00:58

## 2023-08-11 NOTE — ED PROVIDER NOTES
CHIEF COMPLAINT    Chief Complaint   Patient presents with    Hypertension    Arm Pain     right    Chest Pain     HPI  Zoya Aguila is a 33 y.o. female with history of anxiety, depression, hypertension who presents to the ED with complaints of pressure sensation in her right arm and shoulder as well as some lightheadedness and elevated blood pressure. Patient states that she began to notice sensation of feeling lightheaded and flushed while at work yesterday and also began to experience heaviness and pressure in her right arm. The heaviness and pressure in the right arm has remained intermittent since yesterday and she is continue to experience some episodes of lightheadedness described as a sensation she is going to pass out largely with positional changes. She also admits to being noncompliant with her home amlodipine and metoprolol and states her blood pressure has been running higher. She did take those medications just prior to arrival this evening. She states she also just finished completion of treatment for strep pharyngitis and is due for tonsillectomy in the upcoming weeks. She denies new fevers, sinus pressure, chest pain, shortness of breath, nausea, vomiting, numbness, tingling      REVIEW OF SYSTEMS  Constitutional: No fever, chills   Eye: No visual changes  HENT: No earache or sore throat. Resp: No SOB or productive cough. Cardio: No chest pain or palpitations. GI: No abdominal pain, nausea, vomiting, constipation or diarrhea. No melena. : No dysuria, urgency or frequency. Endocrine: No heat intolerance, no cold intolerance, no polydipsia   Lymphatics: No adenopathy  Musculoskeletal: No new muscle aches or joint pain. Complains of heaviness and pressure to right arm  Neuro: Complains of lightheadedness. Psych: No homicidal or suicidal thoughts  Skin: No rash, No itching. ?  ?   PAST MEDICAL HISTORY  Past Medical History:   Diagnosis Date    ADHD     Anxiety     Anxiety disorder Asthma     Depression 2015    Dyspareunia, female     Dysuria     Endometriosis     Frequent urination     Heart abnormality     heart murmur    Herpes simplex virus (HSV) infection     History of irregular menstrual bleeding     Hx of chlamydia infection     IBS (irritable bowel syndrome)     Kidney stones     Menometrorrhagia     Obesity     Pelvic pain     Preeclampsia      FAMILY HISTORY  Family History   Problem Relation Age of Onset    Hypertension Mother     Diabetes Father     Hypertension Father     Cancer Other      SOCIAL HISTORY  Social History     Socioeconomic History    Marital status: Single     Spouse name: None    Number of children: None    Years of education: None    Highest education level: None   Tobacco Use    Smoking status: Every Day     Packs/day: 1.00     Years: 10.00     Pack years: 10.00     Types: Cigarettes     Start date:     Smokeless tobacco: Never   Vaping Use    Vaping Use: Every day    Substances: Nicotine, Flavoring    Devices: Disposable   Substance and Sexual Activity    Alcohol use: Yes     Comment: occ    Drug use: No    Sexual activity: Yes     Partners: Male     Social Determinants of Health     Financial Resource Strain: Low Risk     Difficulty of Paying Living Expenses: Not hard at all   Food Insecurity: No Food Insecurity    Worried About Running Out of Food in the Last Year: Never true    Ran Out of Food in the Last Year: Never true   Transportation Needs: Unmet Transportation Needs    Lack of Transportation (Non-Medical): Yes   Housing Stability: Unknown    Unstable Housing in the Last Year: No       SURGICAL HISTORY  Past Surgical History:   Procedure Laterality Date     SECTION       SECTION N/A 2020     SE[facility] Lindsay [facility]    CYSTOSCOPY, RIGHT URETEROSCOPY performed by Vita Pallas, MD at [address] [place] [address] N/A 2023

## 2023-08-11 NOTE — ED NOTES
Patient does not have to urinate at this time and refuses to try.       Jordan Landis RN  08/11/23 0025

## 2023-08-24 ENCOUNTER — HOSPITAL ENCOUNTER (OUTPATIENT)
Dept: PHYSICAL THERAPY | Age: 28
Setting detail: THERAPIES SERIES
Discharge: HOME | End: 2023-08-24
Payer: COMMERCIAL

## 2023-08-24 PROCEDURE — 97110 THERAPEUTIC EXERCISES: CPT

## 2023-08-24 PROCEDURE — 97161 PT EVAL LOW COMPLEX 20 MIN: CPT

## 2023-08-24 ASSESSMENT — PAIN DESCRIPTION - LOCATION: LOCATION: KNEE

## 2023-08-24 ASSESSMENT — PAIN DESCRIPTION - PAIN TYPE: TYPE: ACUTE PAIN;CHRONIC PAIN

## 2023-08-24 ASSESSMENT — PAIN SCALES - GENERAL: PAINLEVEL_OUTOF10: 4

## 2023-08-24 ASSESSMENT — PAIN DESCRIPTION - ORIENTATION: ORIENTATION: RIGHT

## 2023-08-24 NOTE — FLOWSHEET NOTE
daily - 5 x weekly - 2 sets - 10 reps  - Supine Heel Slide  - 1 x daily - 5 x weekly - 1 sets - 10 reps  - Single Leg Stance  - 1 x daily - 7 x weekly - 1 sets - 3 reps - 30 sec hold    Manual Treatments:        Modalities:        Communication with other providers:   cert sent 7/16/83 Nick Ford*      Assessment:  (Response towards treatment session) (Pain Rating)  Assessment: Pt appears with acute on chronic R knee sprain she initially injured her knee in the Oct 2022 and has several times in the past reinjured the knee. She most recently fell dancing and reinjured it in mid-august. She appears with general systemic joint hypermobility including patellar mobility. She will appear with pain in the patellar edges and surrounding musculature noting her knee moved into valgus before hyperflexion during her recent fall. She would likely benefit from a patellar control sleeve or brace as well as therapy for better dynamic control in the knee area. Pt would benefit from skilled therapy interventions as needed to address listed impairments, progress toward goal completion and improve ADL/IADL status. PT also warranted to reduce risk for further injury or decline. Patient agrees with established plan of care and assisted in the development of their short term and long term goals. Patient had no adverse reaction with initial treatment and there are no barriers to learning. Demonstrates no mental or cognitive disorder.      Plan for Next Session:   Specific Instructions for Next Treatment: progress as tolerated LE strength for dynamic patellar control  Progress as tolerated , R Knee ROM,conditioning, manual, modalities PRN    Pt states she will have limited availability due to her schedule and may need to come mainly on Saturday    Time In / Time Out:    1600- 1645       If [employer] Please Indicate Units for Rx this date and running total for each and overall total for Rx to date:  CPT Code Units today

## 2023-08-24 NOTE — PROGRESS NOTES
increased movements. She notes this type of pain has been going on since Oct of 2022, and she recently re injured it again last week. She reports that she has not had any imaging recently and her most recent imaging was negative back in Oct 2022.   Additional Pertinent Hx (if applicable):     Prior diagnostic testing[de-identified]  (none)  Previous treatments prior to current episode?: Outpatient PT (knee wrap, sleeve, not usng it any longer)  Any changes to allergies, medications, or have you had any medical procedures/ER visits since your last visit?: No      Learning/Language: Learning  Does the patient/guardian have any barriers to learning?: No barriers  Will there be a co-learner?: No  What is the preferred language of the patient/guardian?: English  Is an  required?: No  How does the patient/guardian prefer to learn new concepts?: Listening, Reading, Demonstration, Pictures/Videos     Pain Screening   Pain Screening  Patient Currently in Pain: Yes  Pain Assessment: 0-10  Pain Level: 4  Best Pain Level: 2 (elevation , ext, ice)  Worst Pain Level: 8 (bending, walking, stairs, sit to stand, getting in and out of auto)  Pain Type: Acute pain, Chronic pain  Pain Location: Knee  Pain Orientation: Right  Pain Descriptors: Aching, Nagging, Tightness, Pressure, Dull    Functional Status         Social History:  Social History  Lives With: Family  Type of Home: Apartment  Home Layout: Two level  Home Access: Stairs to enter with rails  Entrance Stairs - Number of Steps: 0 entry  Bathroom Shower/Tub: Tub/Shower unit  Bathroom Toilet: Handicap height    Occupation/Interests:  Occupation: Full time employment  Type of Occupation: office work  Leisure & Hobbies: care and play with kids, dancing, rec walking    Prior Level of Function:         Ind with ADL,     Current Level of Function:  limited more dynamic activity,      ADL Assistance: Independent  Homemaking Assistance: Independent  Homemaking

## 2023-08-24 NOTE — PLAN OF CARE
Outpatient Physical Therapy           [place]           [x] Phone: [phone]   Fax: [phone]  [facility]           [] Phone: [phone]   Fax: [phone]     To: Guzman   From: Nila Mark, PT, DPT     Patient: James Flores       : 1995  Diagnosis: Sprain of unspecified site of right knee, subsequent encounter [S42.59XQ] Diagnosis: R knee sprain  Treatment Diagnosis: R knee sprain, patellar hypermobility  Date: 2023    Physical Therapy Certification/Re-Certification Form  Dear Dr Karan Benitez  The following patient has been evaluated for physical therapy services and for therapy to continue, insurance requires physician review of the treatment plan initially and every 90 days. Please review the attached evaluation and/or summary of the patient's plan of care, and verify that you agree therapy should continue by signing the attached document and sending it back to our office. Assessment:    Assessment: Pt appears with acute on chronic R knee sprain she initially injured her knee in the Oct 2022 and has several times in the past reinjured the knee. She most recently fell dancing and reinjured it in mid-august. She appears with general systemic joint hypermobility including patellar mobility. She will appear with pain in the patellar edges and surrounding musculature noting her knee moved into valgus before hyperflexion during her recent fall. She would likely benefit from a patellar control sleeve or brace as well as therapy for better dynamic control in the knee area. Pt would benefit from skilled therapy interventions as needed to address listed impairments, progress toward goal completion and improve ADL/IADL status. PT also warranted to reduce risk for further injury or decline.     Plan of Care/Treatment to date:  [x] Therapeutic Exercise  [x] Modalities: PRN  [x] Therapeutic Activity     [] Ultrasound  [x] Electrical Stimulation  [x] Gait Training      [] Cervical Traction []

## 2023-08-29 ENCOUNTER — HOSPITAL ENCOUNTER (OUTPATIENT)
Dept: PHYSICAL THERAPY | Age: 28
Setting detail: THERAPIES SERIES
Discharge: HOME | End: 2023-08-29
Payer: COMMERCIAL

## 2023-08-29 PROCEDURE — 97530 THERAPEUTIC ACTIVITIES: CPT

## 2023-08-29 PROCEDURE — 97110 THERAPEUTIC EXERCISES: CPT

## 2023-08-29 NOTE — FLOWSHEET NOTE
Note due:   9/24/23      Plan of Care Interventions:  [x] Therapeutic Exercise  [x] Modalities: PRN  [x] Therapeutic Activity     [] Ultrasound  [x] Estim  [x] Gait Training      [] Cervical Traction [] Lumbar Traction  [x] Neuromuscular Re-education    [x] Cold/hotpack [] Iontophoresis   [x] Instruction in HEP      [x] Vasopneumatic   [] Dry Needling    [x] Manual Therapy               [] Aquatic Therapy              Electronically signed by:  Marci Rahman PTA, 8/29/2023, 1:08 PM

## 2023-09-07 ENCOUNTER — TELEPHONE (OUTPATIENT)
Dept: OBGYN | Age: 28
End: 2023-09-07

## 2023-09-07 RX ORDER — MEDROXYPROGESTERONE ACETATE 10 MG/1
20 TABLET ORAL DAILY
Qty: 20 TABLET | Refills: 0 | Status: SHIPPED | OUTPATIENT
Start: 2023-09-07 | End: 2023-09-17

## 2023-09-07 NOTE — TELEPHONE ENCOUNTER
Pt states she had a d&c 7/19/23. Pt states she had a little bleeding after. Pt state she had her tonsils removed 8/15/23 and has been on her cycle since. Pt would like to know if something can be called in to stop her menses. Pt states she has used all her time off at work and can not take anymore time off. Please advise.

## 2023-09-12 ENCOUNTER — HOSPITAL ENCOUNTER (OUTPATIENT)
Dept: PHYSICAL THERAPY | Age: 28
Setting detail: THERAPIES SERIES
Discharge: HOME | End: 2023-09-12
Payer: COMMERCIAL

## 2023-09-12 PROCEDURE — 97140 MANUAL THERAPY 1/> REGIONS: CPT

## 2023-09-12 PROCEDURE — 97530 THERAPEUTIC ACTIVITIES: CPT

## 2023-09-12 PROCEDURE — 97110 THERAPEUTIC EXERCISES: CPT

## 2023-09-12 PROCEDURE — 97112 NEUROMUSCULAR REEDUCATION: CPT

## 2023-09-12 NOTE — FLOWSHEET NOTE
Outpatient Physical Therapy  [place]           [x] Phone: [phone]   Fax: [phone]  [facility]           [] Phone: [phone]   Fax: [phone]        Physical Therapy Daily Treatment Note  Date:  2023    Patient Name:  Kisha Taylor                     :  1995                       MRN: [id number]  Restrictions/Precautions: No data recorded   Diagnosis:   Sprain of unspecified site of right knee, subsequent encounter [S83.91XD] Diagnosis: R knee sprain  Date of Injury/Surgery:   Treatment Diagnosis:  R knee sprain, patellar hypermobility  Insurance/Certification information: [employer] ( precert needed)   PT APPROVED FOR [address] [place] [address] CPT CODE 02978 [phone]     23-23  Referring Physician:  Juancarlos Guadarrama   PCP: Jacqui Redman MD  Next Doctor Visit:    Plan of care signed (Y/N):  no  Outcome Measure: Q1 pts  Visit# / total visits:  3 /  Iris Singh need precert  Pain level:       2/10   Goals:     Patient goals: stop the pain around the knee cap with increased activity  Short term goals  Time Frame for Short term goals: 4 weeks  1. ind wht HEP for patellar control and LE strength  2. avg pain 2/10 with playing with kids, stairs, and sit to stand  3. reports 50% less tenderness in the patellar area R knee  4. pt to be ind with patellar control brace as needed  Long Term Goals  Time Frame for Long Term Goals: 6-8 eeks  1. ind with gym workout  2. squat to 90/90 wiith good form and  1/10 or less R knee pain  3. reports 75% better overall with all activity including dancing , work, household acivity and plaing with her kids  4. MMT R knee flex , ext, hip abd 4 +/5  5. LEFS improved to 70 pts or better        Summary of Evaluation:  Assessment: Pt appears with acute on chronic R knee sprain she initially injured her knee in the Oct 2022 and has several times in the past reinjured the knee.  She most recently fell dancing and

## 2023-09-23 ENCOUNTER — HOSPITAL ENCOUNTER (OUTPATIENT)
Dept: PHYSICAL THERAPY | Age: 28
Setting detail: THERAPIES SERIES
Discharge: HOME | End: 2023-09-23
Payer: COMMERCIAL

## 2023-09-23 PROCEDURE — 97110 THERAPEUTIC EXERCISES: CPT | Performed by: PHYSICAL THERAPY ASSISTANT

## 2023-09-23 NOTE — FLOWSHEET NOTE
reinjured it in mid-august. She appears with general systemic joint hypermobility including patellar mobility. She will appear with pain in the patellar edges and surrounding musculature noting her knee moved into valgus before hyperflexion during her recent fall. She would likely benefit from a patellar control sleeve or brace as well as therapy for better dynamic control in the knee area. Pt would benefit from skilled therapy interventions as needed to address listed impairments, progress toward goal completion and improve ADL/IADL status. PT also warranted to reduce risk for further injury or decline. Subjective:  Pt stated that she has been bad doing her HEP. Pt stated she needs someone to call her and tell her to exercise. The pt stated she finds the taping beneficial for walking at the right knee. Any changes in Ambulatory Summary Sheet? None      Objective:   poor patella tracking and pad irritation  noted   Pt tends to stand with knees locked in hyperextension which reportedly causes pain at inferior border of patella  Pt needed cues to avoid hyperextension during exercises/ standing. **Pt stated she sits in her chair at work at home on couch and in bed sitting ,\"[place] style\": instructed to avoid all compensated sitting for the current time to protect the joint and patella.       Exercises: (No more than 4 columns)   R knee sprain patellar hypermobility (acute  on chronic)  Exercise/Equipment 1# 8/24/23 8/29/23 #2 9/12/2023 #3 9/23/23 #4              WARM UP       Nu-step   L4 6' L-5 x 5'  7' at 6-7 (mostly at 6)          TABLE       Quad set *10 2x10 5\"  10 x 2 5\" w/ towel  10 x 2 5\" w/ towel   SLR flex *10 2x10  10x2 10x2, 5\"   SLR abd *10 2x10 10x reviewed 10x2   Heel slide *10 gentle 2x10  10x    ADD Squeeze    2x10 Reviewed  Ball at knees, 10x2   Clamshells   2x10 RTB reviewed RTB 10x2   SAQ  2x10  2x10             STANDING       Step ups   4\" 10 x 2  6\" step, 10x2

## 2024-01-11 ENCOUNTER — HOSPITAL ENCOUNTER (OUTPATIENT)
Dept: PHYSICAL THERAPY | Age: 29
Setting detail: THERAPIES SERIES
Discharge: HOME | End: 2024-01-11
Payer: COMMERCIAL

## 2024-01-11 PROCEDURE — 97161 PT EVAL LOW COMPLEX 20 MIN: CPT

## 2024-01-11 NOTE — FLOWSHEET NOTE
patellar and her hypermobile knee in general. She did appear to respond well in testing to the KT tape for patellar control as she is having difficulty finding a J- Brace that fits.     Pt rated pain at 0/10 after treatment.        Patient agrees with established plan of care and assisted in the development of their short term and long term goals. Patient had no adverse reaction with initial treatment and there are no barriers to learning. Demonstrates no mental or cognitive disorder.     Plan for Next Session:      Progress as tolerated , R Knee ROM,conditioning, manual, modalities PRN    Pt states she will have limited availability due to her schedule and may need to come mainly on Saturday    Time In / Time Out:    1705-  1750       If [employer] Please Indicate Units for Rx this date and running total for each and overall total for Rx to date:  CPT Code Units today Running Total Units Total approved    TE 05876  4 9    MAN 03343   1    Gait 01134      NR 85868      TA  84633  2    Estim Unatt 55156        42202      [facility] [place] [address]      VASO 91668       ADL/Self care 46693      DNT 1-2 09394      DNT 3-4 20561      Other:     1      Re-eval 1    Total for episode of care   14        Pt approved for 60 units of each [id number] 12588 10156 01750 [id number]     1/12/24-4/12/24      Timed Code/Total Treatment Minutes:  1 re-eval (  45 )      Next Progress Note due:   2/11/24      Plan of Care Interventions:  [x] Therapeutic Exercise  [x] Modalities: PRN  [x] Therapeutic Activity     [] Ultrasound  [x] Estim  [x] Gait Training      [] Cervical Traction [] Lumbar Traction  [x] Neuromuscular Re-education    [x] Cold/hotpack [] Iontophoresis   [x] Instruction in HEP      [x] Vasopneumatic   [] Dry Needling    [x] Manual Therapy               [] Aquatic Therapy              Electronically signed by:  Maciej Beard, PT 1/11/2024, 7:45 AM     1/11/2024,7:45 AM

## 2024-01-11 NOTE — PROGRESS NOTES
Outpatient Physical Therapy           [place]           [x] Phone: [phone]   Fax: [phone]  [place]           [] Phone: [phone]   Fax: [phone]      To: Naldo Samuels*     From: Maciej Beard, PT, DPT     Patient: Savannah Harrison                    : 1995  Diagnosis:  Sprain of unspecified site of right knee, subsequent encounter [S83.91XD]     Treatment Diagnosis:     R knee sprain, patellar hypermobility   Date: 2024  [x]  Progress Note / Re-Eval               []  Discharge Note    Evaluation Date:  23, re-eval 24   Total Visits to date:   5 Cancels/No-shows to date:  0    Subjective:  pt states the pain is not too high and that she hs not fallen or had the giving way feeling in a while, she is interested in getting stronger and more stable to avoid any more injury. She notes she is taking better care of her depression and will be able to better participate in therapy. She notes she has been limited in doing her HEP by her other medical issues. She notes the K-tape helps too from her previous sessions.       Plan of Care/Treatment to date:  [x] Therapeutic Exercise    [x] Modalities: prn  [x] Therapeutic Activity     [] Ultrasound  [] Electrical Stimulation  [x] Gait Training      [] Cervical Traction    [] Lumbar Traction  [x] Neuromuscular Re-education  [] Cold/hotpack [] Iontophoresis  [x] Instruction in HEP      Other:  [x] Manual Therapy       []  Vasopneumatic  [] Aquatic Therapy       []   Dry Needle Therapy                      Objective/Significant Findings At Last Visit/Comments:  poor patellar tacking and crepitus,  LEFS : 54 pts , not compliant toward HEP , patellar control brace: not able to get one to fit, KT tape , TTP R Knee : min in the patellar edge ,  Squat: limited depth and pain ,   65% change in her typical activity tolerance including dancing, work and ,  MMT R knee flex 4-/5 ext 4-/5 hip abd 4-/5 , R knee PROM flex 132

## 2024-02-02 ENCOUNTER — HOSPITAL ENCOUNTER (OUTPATIENT)
Dept: PHYSICAL THERAPY | Age: 29
Discharge: HOME | End: 2024-02-02

## 2024-02-02 NOTE — FLOWSHEET NOTE
Physical Therapy  Cancellation/No-show Note  Patient Name:  Savannah Harrison  :  1995   Date:  2024  Cancelled visits to date: 1  No-shows to date: 0    For today's appointment patient:  [x]  Cancelled  []  Rescheduled appointment  []  No-show     Reason given by patient:  [x]  Patient ill  []  Conflicting appointment  []  No transportation    []  Conflict with work  []  No reason given  []  Other:     Comments:      Electronically signed by:  Anna Quinteros PTA      2024,12:42 PM

## 2024-02-09 ENCOUNTER — HOSPITAL ENCOUNTER (OUTPATIENT)
Dept: PHYSICAL THERAPY | Age: 29
Setting detail: THERAPIES SERIES
Discharge: HOME | End: 2024-02-09
Payer: COMMERCIAL

## 2024-02-09 PROCEDURE — 97110 THERAPEUTIC EXERCISES: CPT

## 2024-02-09 NOTE — FLOWSHEET NOTE
with general systemic joint hypermobility including patellar mobility. She will appear with pain in the patellar edges and surrounding musculature noting her knee moved into valgus before hyperflexion during her recent fall. She would likely benefit from a patellar control sleeve or brace as well as therapy for better dynamic control in the knee area.  Pt would benefit from skilled therapy interventions as needed to address listed impairments, progress toward goal completion and improve ADL/IADL status.  PT also warranted to reduce risk for further injury or decline.          Subjective:  Pt arrives to tx session reporting 0/10 pain; reports that her knee continues to pop throughout the day.      Any changes in Ambulatory Summary Sheet?  None      Objective:       Good technique with exercises    Exercises: (No more than 4 columns)   R knee sprain patellar hypermobility (acute  on chronic)  Exercise/Equipment 9/12/2023 #3 9/23/23 #4   5# 1/11/24 #6 2/9/24            WARM UP       Nu-step  L-5 x 5'  7' at 6-7 (mostly at 6)  L5 6'          TABLE       Quad set 10 x 2 5\" w/ towel  10 x 2 5\" w/ towel *10 2x10   SLR flex 10x2 10x2, 5\" 1*10 2x10   SLR abd 10x reviewed 10x2 1*10 2x10   Heel slide 10x      ADD Squeeze   Reviewed  Ball at knees, 10x2  2x10   Clamshells  reviewed RTB 10x2  2x10 RTB   SAQ  2x10  2x10             STANDING       Step ups 4\" 10 x 2  6\" step, 10x2                                                    PROPRIOCEPTION       SLB soft knee reviewed 2x20\" sec 2*20 sec                                MODALITIES                         Other Therapeutic Activities/Education:  reviewed HEP nad progress, also need for re-eval post long hold period and need for ins precert again.  Discussed  KT tape and possible replacement of brace  to tape.    Discussed returning to using ice and the possibility of getting a patellar control knee brace / sleeve      Home Exercise Program:    Access Code: [email or web address]  URL:

## 2024-02-16 ENCOUNTER — HOSPITAL ENCOUNTER (OUTPATIENT)
Dept: PHYSICAL THERAPY | Age: 29
Discharge: HOME | End: 2024-02-16

## 2024-02-23 ENCOUNTER — HOSPITAL ENCOUNTER (OUTPATIENT)
Dept: PHYSICAL THERAPY | Age: 29
Setting detail: THERAPIES SERIES
Discharge: HOME | End: 2024-02-23
Payer: COMMERCIAL

## 2024-02-23 PROCEDURE — 97110 THERAPEUTIC EXERCISES: CPT

## 2024-02-23 PROCEDURE — 97530 THERAPEUTIC ACTIVITIES: CPT

## 2024-02-23 NOTE — FLOWSHEET NOTE
Outpatient Physical Therapy  [place]           [x] Phone: [phone]   Fax: [phone]  [place]           [] Phone: [phone]   Fax: [phone]        Physical Therapy Daily Treatment Note  Date:  2024    Patient Name:  Savannah Harrison                     :  1995                       MRN: [id number]  Restrictions/Precautions: No data recorded   Diagnosis:   Sprain of unspecified site of right knee, subsequent encounter [S83.91XD] Diagnosis: R knee sprain  Date of Injury/Surgery:   Treatment Diagnosis:  R knee sprain, patellar hypermobility  Insurance/Certification information: [facility] ( Pt approved for 60 units of each [id number]    24-24)  Referring Physician:  Naldo Samuels* Guzman   PCP: Grace Hinds MD  Next Doctor Visit:    Plan of care signed (Y/N):  no  Outcome Measure: LEFS 60 pts  Visit# / total visits:  -15   Pain level:     2-3/10 anterior knee patella     Goals:     Patient goals: stop the pain around the knee cap with increased activity  Short term goals  Time Frame for Short term goals: 4 weeks  1. ind wht HEP for patellar control and LE strength     met  2. avg pain 2/10 with playing with kids, stairs, and sit to stand   not met  3. reports 50% less tenderness in the patellar area R knee    met  4. pt to be ind with patellar control brace as needed     not met  Long Term Goals  Time Frame for Long Term Goals: 6-8 eeks  1. ind with gym workout    not met  2. squat to 90/90 wiith good form and  1/10 or less R knee pain    not met  3. reports 75% better overall with all activity including dancing , work, household acivity and plaing with her kids    not met  4. MMT R knee flex , ext, hip abd 4 +/5   not met  5.LEFS improved to 70 pts or better  not met        Summary of Evaluation:  Assessment: Pt appears with acute on chronic R knee sprain she initially injured her knee in the Oct 2022 and has several

## 2024-02-23 NOTE — PROGRESS NOTES
Outpatient Physical Therapy           [place]           [x] Phone: [phone]   Fax: [phone]  [place]           [] Phone: [phone]   Fax: [phone]      To: Grace Hinds MD     From: Maciej Beard, PT, DPT     Patient: Savannah Harrison                    : 1995  Diagnosis:  Sprain of unspecified site of right knee, subsequent encounter [S83.91XD]        Treatment Diagnosis:      R knee sprain, patellar hypermobility   Date: 2024  [x]  Progress Note                []  Discharge Note    Evaluation Date:   23  Total Visits to date:   7 Cancels/No-shows to date:  2    Subjective:   Pt states she is feeling much better but does still get the clicking  in the knee with walking faster or pivoting to turn.  She notes the knee is not painful but feels a unsteady.  She notes that she is doing her HEP  regularly but not going to the gym.       Plan of Care/Treatment to date:  [x] Therapeutic Exercise    [] Modalities:  [x] Therapeutic Activity     [] Ultrasound  [] Electrical Stimulation  [x] Gait Training      [] Cervical Traction   [] Lumbar Traction  [x] Neuromuscular Re-education  [] Cold/hotpack [] Iontophoresis  [x] Instruction in HEP      Other:  [x] Manual Therapy       []  Vasopneumatic  [] Aquatic Therapy       []   Dry Needle Therapy                      Objective/Significant Findings At Last Visit/Comments:  ind with HEP,  avg pain 2/10 pinch in below the patella with stairs, playing with her kids and sit to stand,  80% less TTP  R patellar edges,  ind with patellar control brace , pt is not wearing a patellar control brace ,  has not returned to gym workouts  ,  squat to 90/90 : good form but R knee patellar crepitus and discomfort , MMT R knee flex 4/5 ext 4/5 R Hip abd 4/5 ,  LEFS 60pts     Assessment:   Pt appears to be making progress toward her goals. She does appear to be having continued patellar tracking issue and crepitus. She will benefit from additional

## 2024-03-08 ENCOUNTER — HOSPITAL ENCOUNTER (OUTPATIENT)
Dept: PHYSICAL THERAPY | Age: 29
Discharge: HOME | End: 2024-03-08

## 2024-03-08 NOTE — FLOWSHEET NOTE
Physical Therapy  Cancellation/No-show Note  Patient Name:  Savannah Harrison  :  1995   Date:  3/8/2024  Cancelled visits to date: 2  No-shows to date: 0    For today's appointment patient:  [x]  Cancelled  []  Rescheduled appointment  []  No-show     Reason given by patient:  []  Patient ill  []  Conflicting appointment  []  No transportation    []  Conflict with work  []  No reason given  [x]  Other:  Patient has to pick her kids up early from , she will not be able to make this appointment.         Electronically signed by:  Maciej Beard, PT, 3/8/2024,3:24 PM

## 2024-03-29 ENCOUNTER — HOSPITAL ENCOUNTER (OUTPATIENT)
Dept: PHYSICAL THERAPY | Age: 29
Discharge: HOME | End: 2024-03-29

## 2024-03-29 NOTE — FLOWSHEET NOTE
Physical Therapy  Cancellation/No-show Note  Patient Name:  Savannah Harrison  :  1995   Date:  3/29/2024  Cancelled visits to date: 3  No-shows to date: 0    For today's appointment patient:  [x]  Cancelled  []  Rescheduled appointment  []  No-show     Reason given by patient:  []  Patient ill  []  Conflicting appointment  []  No transportation    [x]  Conflict with work  []  No reason given  []  Other:  This was pt's last scheduled visit, pt has not been seen in clinic since 24. Will let pt fall off caseload based on no show / cancel policy          Electronically signed by:  Benita Ness PTA, BSPTA  3/29/2024,3:09 PM

## 2024-07-03 PROBLEM — L73.2 HIDRADENITIS SUPPURATIVA OF MULTIPLE SITES: Status: ACTIVE | Noted: 2024-07-03

## 2024-07-03 PROBLEM — L72.3 SEBACEOUS CYST: Status: ACTIVE | Noted: 2024-07-03

## 2024-08-14 ENCOUNTER — OFFICE VISIT (OUTPATIENT)
Dept: OBGYN | Age: 29
End: 2024-08-14
Payer: COMMERCIAL

## 2024-08-14 ENCOUNTER — HOSPITAL ENCOUNTER (OUTPATIENT)
Age: 29
Setting detail: SPECIMEN
Discharge: HOME | End: 2024-08-14
Payer: COMMERCIAL

## 2024-08-14 VITALS
BODY MASS INDEX: 39.16 KG/M2 | DIASTOLIC BLOOD PRESSURE: 95 MMHG | WEIGHT: 221 LBS | SYSTOLIC BLOOD PRESSURE: 135 MMHG | HEIGHT: 63 IN

## 2024-08-14 DIAGNOSIS — R63.5 WEIGHT GAIN: ICD-10-CM

## 2024-08-14 DIAGNOSIS — Z01.419 ENCOUNTER FOR ANNUAL ROUTINE GYNECOLOGICAL EXAMINATION: Primary | ICD-10-CM

## 2024-08-14 DIAGNOSIS — Z11.3 ENCOUNTER FOR SCREENING EXAMINATION FOR SEXUALLY TRANSMITTED INFECTION: ICD-10-CM

## 2024-08-14 PROCEDURE — 36415 COLL VENOUS BLD VENIPUNCTURE: CPT

## 2024-08-14 PROCEDURE — 99395 PREV VISIT EST AGE 18-39: CPT

## 2024-08-14 PROCEDURE — 87624 HPV HI-RISK TYP POOLED RSLT: CPT

## 2024-08-14 PROCEDURE — 99459 PELVIC EXAMINATION: CPT

## 2024-08-14 PROCEDURE — 88142 CYTOPATH C/V THIN LAYER: CPT

## 2024-08-14 PROCEDURE — 87801 DETECT AGNT MULT DNA AMPLI: CPT

## 2024-08-14 RX ORDER — DEXTROAMPHETAMINE SACCHARATE, AMPHETAMINE ASPARTATE MONOHYDRATE, DEXTROAMPHETAMINE SULFATE AND AMPHETAMINE SULFATE 1.25; 1.25; 1.25; 1.25 MG/1; MG/1; MG/1; MG/1
5 CAPSULE, EXTENDED RELEASE ORAL EVERY MORNING
COMMUNITY

## 2024-08-14 SDOH — ECONOMIC STABILITY: INCOME INSECURITY: HOW HARD IS IT FOR YOU TO PAY FOR THE VERY BASICS LIKE FOOD, HOUSING, MEDICAL CARE, AND HEATING?: NOT HARD AT ALL

## 2024-08-14 SDOH — ECONOMIC STABILITY: FOOD INSECURITY: WITHIN THE PAST 12 MONTHS, THE FOOD YOU BOUGHT JUST DIDN'T LAST AND YOU DIDN'T HAVE MONEY TO GET MORE.: NEVER TRUE

## 2024-08-14 SDOH — ECONOMIC STABILITY: FOOD INSECURITY: WITHIN THE PAST 12 MONTHS, YOU WORRIED THAT YOUR FOOD WOULD RUN OUT BEFORE YOU GOT MONEY TO BUY MORE.: NEVER TRUE

## 2024-08-14 ASSESSMENT — ENCOUNTER SYMPTOMS
CONSTIPATION: 0
VOMITING: 0
GASTROINTESTINAL NEGATIVE: 1
CHEST TIGHTNESS: 0
NAUSEA: 0
ABDOMINAL PAIN: 0
RESPIRATORY NEGATIVE: 1
SHORTNESS OF BREATH: 0
DIARRHEA: 0

## 2024-08-14 ASSESSMENT — PATIENT HEALTH QUESTIONNAIRE - PHQ9
5. POOR APPETITE OR OVEREATING: NOT AT ALL
8. MOVING OR SPEAKING SO SLOWLY THAT OTHER PEOPLE COULD HAVE NOTICED. OR THE OPPOSITE, BEING SO FIGETY OR RESTLESS THAT YOU HAVE BEEN MOVING AROUND A LOT MORE THAN USUAL: NOT AT ALL
3. TROUBLE FALLING OR STAYING ASLEEP: NOT AT ALL
10. IF YOU CHECKED OFF ANY PROBLEMS, HOW DIFFICULT HAVE THESE PROBLEMS MADE IT FOR YOU TO DO YOUR WORK, TAKE CARE OF THINGS AT HOME, OR GET ALONG WITH OTHER PEOPLE: NOT DIFFICULT AT ALL
9. THOUGHTS THAT YOU WOULD BE BETTER OFF DEAD, OR OF HURTING YOURSELF: NOT AT ALL
SUM OF ALL RESPONSES TO PHQ9 QUESTIONS 1 & 2: 0
SUM OF ALL RESPONSES TO PHQ QUESTIONS 1-9: 0
SUM OF ALL RESPONSES TO PHQ QUESTIONS 1-9: 0
1. LITTLE INTEREST OR PLEASURE IN DOING THINGS: NOT AT ALL
SUM OF ALL RESPONSES TO PHQ QUESTIONS 1-9: 0
6. FEELING BAD ABOUT YOURSELF - OR THAT YOU ARE A FAILURE OR HAVE LET YOURSELF OR YOUR FAMILY DOWN: NOT AT ALL
2. FEELING DOWN, DEPRESSED OR HOPELESS: NOT AT ALL
4. FEELING TIRED OR HAVING LITTLE ENERGY: NOT AT ALL
SUM OF ALL RESPONSES TO PHQ QUESTIONS 1-9: 0
7. TROUBLE CONCENTRATING ON THINGS, SUCH AS READING THE NEWSPAPER OR WATCHING TELEVISION: NOT AT ALL

## 2024-08-14 NOTE — PROGRESS NOTES
24    Savannah Harrison  1995    Chief Complaint   Patient presents with    Annual Exam     Annual exam. Last pap: 4/15/2021, LMP: 2024, menses regular. Currently sexually active, b/c- tubal ligation. Would like STD screening, denies any symptoms. Pt would like thyroid checked, states she was on adipex and lost ten pounds and gained it back ithin a few weeks        The patient is a 29 y.o. female,  who presents for her annual exam.  She is menstruating normally. She is  sexually active. She is not currently taking birth control, had tubal ligation.     She reports no gynecological symptoms.  Would like full STI testing. Would also like thyroid checked due to recent weight fluctuations. Of note, she recently stopped Adipex because her psychiatrist is starting her on Adderall.     Pap smear history: Her last PAP smear was in .  Her results were normal per patient.    Past Medical History:   Diagnosis Date    ADHD     Anxiety     Anxiety disorder     Asthma     Depression     Dyspareunia, female     Dysuria     Endometriosis     Frequent urination     Heart abnormality     heart murmur    Herpes simplex virus (HSV) infection     History of irregular menstrual bleeding     Hx of chlamydia infection     IBS (irritable bowel syndrome)     Kidney stones     Menometrorrhagia     Obesity     Pelvic pain     Preeclampsia        Past Surgical History:   Procedure Laterality Date     SECTION       SECTION N/A 2020     SECTION performed by Naldo Samuels MD at [facility] L&D OR    CYSTOSCOPY Right 2021    CYSTOSCOPY, RIGHT URETEROSCOPY performed by Darren Mckee MD at [facility] OR    DILATION AND CURETTAGE OF UTERUS N/A 2023    DILATATION AND CURETTAGE HYSTEROSCOPY performed by Naldo Samuels MD at [facility] OR    LAPAROSCOPY      LAPAROSCOPY N/A 2023    LAPAROSCOPY EXPLORATORY performed by Naldo Samuels MD at [facility] OR       Family History

## 2024-08-15 LAB
CANDIDA DNA VAG QL NAA+PROBE: ABNORMAL
G VAGINALIS DNA SPEC QL NAA+PROBE: ABNORMAL
HERPES SIMPLEX VIRUS 1 IGG: POSITIVE
HERPES SIMPLEX VIRUS 2 IGG: POSITIVE
HIV 1+2 AB+HIV1 P24 AG SERPL QL IA: NORMAL
HIV 2 AB SERPL QL IA: NORMAL
HIV1 AB SERPL QL IA: NORMAL
HIV1 P24 AG SERPL QL IA: NORMAL
REAGIN+T PALLIDUM IGG+IGM SERPL-IMP: NORMAL
T VAGINALIS DNA VAG QL NAA+PROBE: ABNORMAL
TSH SERPL DL<=0.005 MIU/L-ACNC: 1.28 UIU/ML (ref 0.27–4.2)

## 2024-08-16 LAB — HBV SURFACE AG SERPL QL IA: NORMAL

## 2024-08-16 RX ORDER — METRONIDAZOLE 500 MG/1
500 TABLET ORAL 2 TIMES DAILY
Qty: 14 TABLET | Refills: 0 | Status: SHIPPED | OUTPATIENT
Start: 2024-08-16

## 2024-08-17 LAB
C TRACH RRNA SPEC QL NAA+PROBE: NEGATIVE
N GONORRHOEA RRNA SPEC QL NAA+PROBE: NEGATIVE

## 2024-08-18 LAB — HPV HIGH RISK: NOT DETECTED

## 2024-08-19 RX ORDER — CLINDAMYCIN PHOSPHATE 20 MG/G
CREAM VAGINAL
Qty: 7 EACH | Refills: 0 | Status: SHIPPED | OUTPATIENT
Start: 2024-08-19

## 2024-08-26 ENCOUNTER — TELEPHONE (OUTPATIENT)
Dept: OBGYN | Age: 29
End: 2024-08-26

## 2024-08-26 NOTE — TELEPHONE ENCOUNTER
Pt requesting medication for vaginal yeast infection. Pt c/o vaginal itching and discharge x 1 day. Pt was last seen on 8/14/24. Please advise.

## 2024-08-27 RX ORDER — FLUCONAZOLE 150 MG/1
TABLET ORAL
Qty: 2 TABLET | Refills: 0 | Status: SHIPPED | OUTPATIENT
Start: 2024-08-27

## 2024-08-29 ENCOUNTER — OFFICE VISIT MH/BH (OUTPATIENT)
Dept: BARIATRICS/WEIGHT MGMT | Age: 29
End: 2024-08-29
Payer: COMMERCIAL

## 2024-08-29 VITALS
BODY MASS INDEX: 39.44 KG/M2 | HEIGHT: 63 IN | HEART RATE: 74 BPM | SYSTOLIC BLOOD PRESSURE: 132 MMHG | WEIGHT: 222.6 LBS | DIASTOLIC BLOOD PRESSURE: 64 MMHG | OXYGEN SATURATION: 98 %

## 2024-08-29 DIAGNOSIS — E66.9 OBESITY (BMI 30-39.9): Primary | ICD-10-CM

## 2024-08-29 DIAGNOSIS — Z79.899 MEDICATION MANAGEMENT: ICD-10-CM

## 2024-08-29 PROCEDURE — 99204 OFFICE O/P NEW MOD 45 MIN: CPT | Performed by: NURSE PRACTITIONER

## 2024-08-29 PROCEDURE — G8427 DOCREV CUR MEDS BY ELIG CLIN: HCPCS | Performed by: NURSE PRACTITIONER

## 2024-08-29 PROCEDURE — 4004F PT TOBACCO SCREEN RCVD TLK: CPT | Performed by: NURSE PRACTITIONER

## 2024-08-29 PROCEDURE — 80305 DRUG TEST PRSMV DIR OPT OBS: CPT | Performed by: NURSE PRACTITIONER

## 2024-08-29 PROCEDURE — G8417 CALC BMI ABV UP PARAM F/U: HCPCS | Performed by: NURSE PRACTITIONER

## 2024-08-29 RX ORDER — HYDROCHLOROTHIAZIDE 12.5 MG/1
12.5 TABLET ORAL DAILY
COMMUNITY
Start: 2024-07-19

## 2024-08-29 NOTE — PROGRESS NOTES
Chief Complaint   Patient presents with    Bariatric, Initial Visit     N/P 1st Medication WM Visit   Has Bar Cov         SUBJECTIVE:    HPI: Patient is here with complaints of: NEW monthly Qsymia visit.    Obesity with a BMI of Body mass index is 39.43 kg/m²..    Current weight: 222 pounds    Any new absolute contraindications (drug class allergy, pregnancy or breastfeeding, glaucoma, hyperthyroidism, current use of MAOIs, drug abuse, CAD, uncontrolled HTN, arrhythmias, stroke, CHF) to being on medication: DENIES    Any new mood disorders or suicidal thoughts/behaviors: DENIES    Current dietary measures: not tracking calories; eats once a day    Current exercise measures: limited    I have reviewed the patient's(pertinent information to this visit) medical history, family history(scanned in  the [employer] under \"patient questioner\"), social history and review of systems with the patient today in the office.          Past Surgical History:   Procedure Laterality Date     SECTION       SECTION N/A 2020     SECTION performed by Naldo Samuels MD at [facility] OR    CYSTOSCOPY Right 2021    CYSTOSCOPY, RIGHT URETEROSCOPY performed by Darren Mckee MD at [facility] OR    DILATION AND CURETTAGE OF UTERUS N/A 2023    DILATATION AND CURETTAGE HYSTEROSCOPY performed by Naldo Samuels MD at [facility] OR    LAPAROSCOPY      LAPAROSCOPY N/A 2023    LAPAROSCOPY EXPLORATORY performed by Naldo Samuels MD at [facility] OR       Past Medical History:   Diagnosis Date    ADHD     Anxiety     Anxiety disorder     Asthma     Depression     Dyspareunia, female     Dysuria     Endometriosis     Frequent urination     Heart abnormality     heart murmur    Herpes simplex virus (HSV) infection     History of irregular menstrual bleeding     Hx of chlamydia infection     IBS (irritable bowel syndrome)     Kidney stones     Menometrorrhagia     Obesity     Pelvic pain      round, and reactive to light.   Cardiovascular:      Rate and Rhythm: Normal rate.      Pulses: Normal pulses.   Pulmonary:      Effort: Pulmonary effort is normal.   Musculoskeletal:         General: Normal range of motion.      Cervical back: Normal range of motion.   Skin:     General: Skin is warm and dry.      Capillary Refill: Capillary refill takes less than 2 seconds.   Neurological:      General: No focal deficit present.      Mental Status: She is alert and oriented to person, place, and time.   Psychiatric:         Mood and Affect: Mood normal.         Behavior: Behavior normal.           ASSESSMENT:  1. Obesity (BMI 30-39.9)  - Start tracking calories; about 8722-2635 daily  - Limit carb intake  - Eat 5-6 small meals/ snacks daily  - 64 oz water daily  - Increase activity as tolerated  - Most recent A1c = normal  - See RD     2. Medication management  - Limited on medications per insurance coverage  - Has taken Adipex twice in the past without side effects but with weight regain  - Recently started on Adderall  - Would benefit from Qsymia- information given to pt  - Baseline workup ordered  - RTC 1-2 weeks        Orders Placed This Encounter   Procedures    CBC with Auto Differential    Comprehensive Metabolic Panel    Amb Referral to Nutrition Services    POCT Rapid Drug Screen        No orders of the defined types were placed in this encounter.       Follow Up:  Return in about 2 weeks (around 9/12/2024).      MARGARITO Cowart - CNP

## 2024-10-22 ENCOUNTER — HOSPITAL ENCOUNTER (EMERGENCY)
Age: 29
Discharge: HOME | End: 2024-10-22

## 2024-10-22 VITALS
WEIGHT: 220 LBS | TEMPERATURE: 98.2 F | HEART RATE: 95 BPM | HEIGHT: 63 IN | DIASTOLIC BLOOD PRESSURE: 86 MMHG | SYSTOLIC BLOOD PRESSURE: 134 MMHG | RESPIRATION RATE: 18 BRPM | OXYGEN SATURATION: 99 % | BODY MASS INDEX: 38.98 KG/M2

## 2025-01-20 ENCOUNTER — HOSPITAL ENCOUNTER (OUTPATIENT)
Dept: PHYSICAL THERAPY | Age: 30
Setting detail: THERAPIES SERIES
Discharge: HOME | End: 2025-01-20
Payer: COMMERCIAL

## 2025-01-20 PROCEDURE — 97161 PT EVAL LOW COMPLEX 20 MIN: CPT

## 2025-01-20 PROCEDURE — 97110 THERAPEUTIC EXERCISES: CPT

## 2025-01-20 ASSESSMENT — PAIN DESCRIPTION - ORIENTATION: ORIENTATION: RIGHT

## 2025-01-20 ASSESSMENT — PAIN DESCRIPTION - LOCATION: LOCATION: KNEE

## 2025-01-20 ASSESSMENT — PAIN DESCRIPTION - PAIN TYPE: TYPE: CHRONIC PAIN

## 2025-01-20 NOTE — PROGRESS NOTES
Physical Therapy: Initial Evaluation    Patient: Savannah Harrison (29 y.o. female)   Examination Date: 2025  Plan of Care Certification Period: 2025 to        :  1995 ;    Confirmed: Yes MRN: [id number]  CSN: [id number]   Insurance: Payor: [facility] / Plan: [facility] MEDICAID / Product Type: *No Product type* /   Insurance ID: [id number] - (Medicaid Managed) Secondary Insurance (if applicable):    Referring Physician: Earl Ferrer FNP     PCP: Earl Ferrer FNP Visits to Date/Visits Approved:   /      No Show/Cancelled Appts:   /       Medical Diagnosis: Pain in right knee [M25.561]    Treatment Diagnosis: R knee hypermobility, general thigh and hip weakness, occasional knee pain     PERTINENT MEDICAL HISTORY   Patient Assessed for Rehabilitation Services: Yes  Self reported health status:: Good    Medical History: Chart Reviewed: Yes   Past Medical History:   Diagnosis Date    ADHD     Anxiety     Anxiety disorder     Asthma     Depression     Dyspareunia, female     Dysuria     Endometriosis     Frequent urination     Heart abnormality     heart murmur    Herpes simplex virus (HSV) infection     History of irregular menstrual bleeding     Hx of chlamydia infection     IBS (irritable bowel syndrome)     Kidney stones     Menometrorrhagia     Obesity     Pelvic pain     Preeclampsia      Surgical History:   Past Surgical History:   Procedure Laterality Date     SECTION       SECTION N/A 2020     SECTION performed by Naldo Samuels MD at [facility] OR    CYSTOSCOPY Right 2021    CYSTOSCOPY, RIGHT URETEROSCOPY performed by Darren Mckee MD at [facility] OR    DILATION AND CURETTAGE OF UTERUS N/A 2023    DILATATION AND CURETTAGE HYSTEROSCOPY performed by Naldo Samuels MD at [facility] OR    LAPAROSCOPY      LAPAROSCOPY N/A 2023    LAPAROSCOPY EXPLORATORY performed by Naldo Samuels MD at [facility] OR       Medications:

## 2025-01-20 NOTE — FLOWSHEET NOTE
occasionally giving out occurrences in her R knee.  Pt would benefit from skilled therapy interventions as needed to address listed impairments, progress toward goal completion and improve ADL/IADL status.  PT also warranted to reduce risk for further injury or decline.        Subjective:  See eval         Any changes in Ambulatory Summary Sheet?  None        Objective:  See eval           Exercises: (No more than 4 columns)  R knee, hypermobility weakness, limited dynamic control  Exercise/Equipment 1# 1/20/25 Date Date           WARM UP      nustep            TABLE      Bridge with band Gtb *10     SLR flex *10     SLR abd                                             STANDING      Side stepping with band Gtb *10     Mini squat with band Gtb *10     Steam boats      Shuttle LP B      Shuttle LP R                       PROPRIOCEPTION      Balance board                              MODALITIES                      Other Therapeutic Activities/Education:  Patient received education on their current pathology and how their condition effects them with their functional activities. Patient understood discussion and questions were answered. Patient understands their activity limitations and understands rational for treatment progression.       Home Exercise Program:  Pt completed 1 set of HEP in clinic with instruction per HEP sheet dated today. Appeared to understand program. Any questions clarified.      Access Code: [email or web address]  URL: [email or web address]  Date: 01/20/2025  Prepared by: Maciej Beard    Exercises  - Supine Bridge  - 1 x daily - 7 x weekly - 2 sets - 10 reps  - Beginner Side Leg Lift  - 1 x daily - 7 x weekly - 2 sets - 10 reps  - Small Range Straight Leg Raise  - 1 x daily - 7 x weekly - 2 sets - 10 reps  - Side Stepping with Resistance at Thighs  - 1 x daily - 7 x weekly - 3 sets - 10 reps -  each way hold  - Mini Squat with Counter Support  - 1 x daily - 7 x weekly - 3 sets - 10 reps    Manual

## 2025-01-20 NOTE — PLAN OF CARE
Outpatient Physical Therapy           [place]           [x] Phone: [phone]   Fax: [phone]  [place]           [] Phone: [phone]   Fax: [phone]     To: Earl Ferrer FNP     From: Maciej Beard, PT, DPT     Patient: Savannah Harrison       : 1995  Diagnosis: Pain in right knee [M25.561]    Treatment Diagnosis: R knee hypermobility, general thigh and hip weakness, occasional knee pain  Date: 2025    Physical Therapy Certification/Re-Certification Form  Dear Earl Ferrer FNP  The following patient has been evaluated for physical therapy services and for therapy to continue, insurance requires physician review of the treatment plan initially and every 90 days. Please review the attached evaluation and/or summary of the patient's plan of care, and verify that you agree therapy should continue by signing the attached document and sending it back to our office.    Assessment:    Assessment: Pt appears to have chronic R knee pain that appears related to her knee occasionally giving out during increased activity.  She appears today with no pain and is not TTP in the knee either, she has full Rom and actually demonstrates hypermobility in B patella and general hypermobility throughout the R and L knees. She does not appear to have the dynamic muscle strength and control to manage her hypermobility. This appears to be causing her occasionally giving out occurrences in her R knee.  Pt would benefit from skilled therapy interventions as needed to address listed impairments, progress toward goal completion and improve ADL/IADL status.  PT also warranted to reduce risk for further injury or decline.    Plan of Care/Treatment to date:  [] Therapeutic Exercise  [] Modalities:  [x] Therapeutic Activity     [] Ultrasound  [] Electrical Stimulation  [] Gait Training      [] Cervical Traction [] Lumbar Traction  [x] Neuromuscular Re-education    [] Cold/hotpack [] Iontophoresis   [x]

## 2025-01-29 ENCOUNTER — HOSPITAL ENCOUNTER (OUTPATIENT)
Dept: PHYSICAL THERAPY | Age: 30
Setting detail: THERAPIES SERIES
Discharge: HOME | End: 2025-01-29
Payer: COMMERCIAL

## 2025-01-29 PROCEDURE — 97110 THERAPEUTIC EXERCISES: CPT

## 2025-01-29 NOTE — FLOWSHEET NOTE
Outpatient Physical Therapy  [place]           [x] Phone: [phone]   Fax: [phone]  [place]           [] Phone: [phone]   Fax: [phone]        Physical Therapy Daily Treatment Note  Date:  2025    Patient Name:  Savannah Harrison    :  1995  MRN: [id number]  Restrictions/Precautions: No data recorded   Diagnosis:   Pain in right knee [M25.561]    Date of Injury/Surgery:   Treatment Diagnosis:  R knee hypermobility, general thigh and hip weakness, occasional knee pain  [facility] [place] [facility]  Referring Physician:  Earl Ferrer FNP     PCP: Earl Ferrer FNP  Next Doctor Visit:    Plan of care signed (Y/N):   no  Outcome Measure: LEFS 57 pts  Visit# / total visits:   /  6-10  Pain level:      0/10  R knee  Goals:     Patient goals: grater feeling of stability and commfort in her R knee with higher level ADLand playing with her kids, get approved for MRI if needed    Short term goals  Time Frame for Short term goals: 3-4 weeks  1.  ind with HEP fro R knee and hip strength and proprioception    Long Term Goals  Time Frame for Long Term Goals: 5-6 weeks  1. reports descending stairs with no issue in the R knee reciprocally  2. reports starting and comfortbaly with a light gym workout including LE , hip,  and balacne activity  3. MMT R knee flex and ext , hip abd 4+/5  4. demo good squat form to 90/90 wiht no issue  5 LEFS improved to 65 pts of better        Summary of Evaluation:  Assessment: Pt appears to have chronic R knee pain that appears related to her knee occasionally giving out during increased activity.  She appears today with no pain and is not TTP in the knee either, she has full Rom and actually demonstrates hypermobility in B patella and general hypermobility throughout the R and L knees. She does not appear to have the dynamic muscle strength and control to manage her hypermobility. This appears to be causing her occasionally

## 2025-03-04 ENCOUNTER — HOSPITAL ENCOUNTER (OUTPATIENT)
Dept: PHYSICAL THERAPY | Age: 30
Discharge: HOME | End: 2025-03-04

## 2025-03-04 NOTE — FLOWSHEET NOTE
Physical Therapy  Cancellation/No-show Note  Patient Name:  Savannah Harrison  :  1995   Date:  3/4/2025  Cancelled visits to date: 1  No-shows to date: 2    For today's appointment patient:  [x]  Cancelled  []  Rescheduled appointment  []  No-show     Reason given by patient:  []  Patient ill  []  Conflicting appointment  []  No transportation    []  Conflict with work  []  No reason given  [x]  Other:     Comments:  pt's daughter is sick.    Electronically signed by:  Anna Quinteros PTA      3/4/2025,5:57 PM

## 2025-03-12 ENCOUNTER — HOSPITAL ENCOUNTER (OUTPATIENT)
Dept: PHYSICAL THERAPY | Age: 30
Discharge: HOME | End: 2025-03-12

## 2025-03-12 NOTE — FLOWSHEET NOTE
Physical Therapy  Cancellation/No-show Note  Patient Name:  Savannah Harrison  :  1995   Date:  3/12/2025  Cancelled visits to date: 2  No-shows to date: 2    For today's appointment patient:  [x]  Cancelled  []  Rescheduled appointment  []  No-show     Reason given by patient:  []  Patient ill  [x]  Conflicting appointment  []  No transportation    []  Conflict with work  []  No reason given  [x]  Other:     Comments:  Child has appt same time today .    Electronically signed by:  Maciej Beard PT, 3/12/2025,10:50 AM

## 2025-07-31 RX ORDER — VALACYCLOVIR HYDROCHLORIDE 500 MG/1
500 TABLET, FILM COATED ORAL 2 TIMES DAILY
Qty: 60 TABLET | Refills: 11 | Status: SHIPPED | OUTPATIENT
Start: 2025-07-31

## (undated) DEVICE — Z INACTIVE USE 2863041 SPONGE GZ W4XL4IN 100% COT 16 PLY RADPQ HIGHLY ABSRB

## (undated) DEVICE — PREMIUM WET SKIN PREP TRAY: Brand: MEDLINE INDUSTRIES, INC.

## (undated) DEVICE — MAT FLOOR ULTRA ABS 28X48IN

## (undated) DEVICE — KIT ANTIFOG SOL 6GM IPA DISP FOR ENDOSCP PROC FRED DEXIDE

## (undated) DEVICE — COUNTER NDL 30 COUNT FOAM STRP SGL MAG

## (undated) DEVICE — DRAPE, LAVH, STERILE: Brand: MEDLINE

## (undated) DEVICE — INTENDED FOR TISSUE SEPARATION, AND OTHER PROCEDURES THAT REQUIRE A SHARP SURGICAL BLADE TO PUNCTURE OR CUT.: Brand: BARD-PARKER ® STAINLESS STEEL BLADES

## (undated) DEVICE — GOWN,ECLIPSE,POLYRNF,BRTHSLV,L,30/CS: Brand: MEDLINE

## (undated) DEVICE — TROCAR: Brand: KII® SLEEVE

## (undated) DEVICE — MATERNITY PAD,HEAVY: Brand: CURITY

## (undated) DEVICE — SUTURE MCRYL SZ 4-0 L18IN ABSRB UD L19MM PS-2 3/8 CIR PRIM Y496G

## (undated) DEVICE — DRAINBAG,ANTI-REFLUX TOWER,L/F,2000ML,LL: Brand: MEDLINE

## (undated) DEVICE — TOWEL,OR,DSP,ST,BLUE,STD,6/PK,12PK/CS: Brand: MEDLINE

## (undated) DEVICE — LEGGINGS, PAIR, CLEAR, STERILE: Brand: MEDLINE

## (undated) DEVICE — NEEDLE HYPO 23GA L1.5IN TURQ POLYPR HUB S STL THN WALL IM

## (undated) DEVICE — PACK,BASIC,IX: Brand: MEDLINE

## (undated) DEVICE — TRAY EPI 25GA L3.5IN CONTAIN BPA DEHP PVC PENCAN

## (undated) DEVICE — SOLUTION IV 1000ML 0.9% SOD CHL FOR IRRIG PLAS CONT

## (undated) DEVICE — AMPLATZ RENAL DILATOR SET: Brand: AMPLATZ

## (undated) DEVICE — JELLY,LUBE,STERILE,FLIP TOP,TUBE,2-OZ: Brand: MEDLINE

## (undated) DEVICE — BAG SPEC REM 224ML W4XL6IN DIA10MM 1 HND GYN DISP ENDOPCH

## (undated) DEVICE — SEALER ENDOSCP L37CM NANO COAT BLNT TIP LAP DIV

## (undated) DEVICE — SYRINGE MED 10ML LUERLOCK TIP W/O SFTY DISP

## (undated) DEVICE — SINGLE PORT MANIFOLD: Brand: NEPTUNE 2

## (undated) DEVICE — SHEET,DRAPE,UNDERBUTTOCK,GRAD POUCH,PORT: Brand: MEDLINE

## (undated) DEVICE — TUBING INSUFFLATOR HEAT HI FLO SET PNEUMOCLEAR

## (undated) DEVICE — BANDAGE ADH W1XL3IN NAT FAB WVN FLX DURABLE N ADH PD SEAL

## (undated) DEVICE — MARKER SURG SKIN UTIL REGULAR/FINE 2 TIP W/ RUL AND 9 LBL

## (undated) DEVICE — Z INACTIVE COVER MAYO STAND CLTH

## (undated) DEVICE — SOLUTION 1000ML NRML NACL

## (undated) DEVICE — ELECTRODE ES AD CRDLSS PT RET REM POLYHESIVE

## (undated) DEVICE — SET IRRIG L94IN ID0.281IN W/ 4.5IN DST FLX CONN 2 LD ON OFF

## (undated) DEVICE — TELFA NON-ADHERENT ABSORBENT DRESSING: Brand: TELFA

## (undated) DEVICE — GLOVE ORANGE PI 7   MSG9070

## (undated) DEVICE — CATHETER,URETHRAL,VINYL,MALE,16",16 FR: Brand: MEDLINE

## (undated) DEVICE — TRAY PREP DRY W/ PREM GLV 2 APPL 6 SPNG 2 UNDPD 1 OVERWRAP

## (undated) DEVICE — TROCAR: Brand: KII FIOS FIRST ENTRY

## (undated) DEVICE — GLOVE ORANGE PI 7 1/2   MSG9075

## (undated) DEVICE — BASIC PACK: Brand: CONVERTORS

## (undated) DEVICE — 500ML,PRESSURE INFUSER W/STOPCOCK: Brand: MEDLINE

## (undated) DEVICE — DBD-PACK,CYSTOSCOPY,PK VI,AURORA: Brand: MEDLINE

## (undated) DEVICE — Z INACTIVE NO ACTIVE SUPPLIER APPLICATOR MEDICATED 26 CC TINT HI-LITE ORNG STRL CHLORAPREP

## (undated) DEVICE — LINER,SEMI-RIGID,3000CC,50EA/CS: Brand: MEDLINE